# Patient Record
Sex: FEMALE | Race: WHITE | Employment: OTHER | URBAN - NONMETROPOLITAN AREA
[De-identification: names, ages, dates, MRNs, and addresses within clinical notes are randomized per-mention and may not be internally consistent; named-entity substitution may affect disease eponyms.]

---

## 2019-03-26 ENCOUNTER — OFFICE VISIT (OUTPATIENT)
Dept: OBGYN | Age: 69
End: 2019-03-26
Payer: MEDICARE

## 2019-03-26 VITALS
WEIGHT: 174 LBS | HEART RATE: 62 BPM | HEIGHT: 60 IN | DIASTOLIC BLOOD PRESSURE: 74 MMHG | BODY MASS INDEX: 34.16 KG/M2 | SYSTOLIC BLOOD PRESSURE: 135 MMHG

## 2019-03-26 DIAGNOSIS — Z76.89 ENCOUNTER TO ESTABLISH CARE WITH NEW DOCTOR: Primary | ICD-10-CM

## 2019-03-26 DIAGNOSIS — M54.5 ACUTE LOW BACK PAIN, UNSPECIFIED BACK PAIN LATERALITY, WITH SCIATICA PRESENCE UNSPECIFIED: ICD-10-CM

## 2019-03-26 DIAGNOSIS — Z01.419 ENCOUNTER FOR ROUTINE GYNECOLOGIC EXAMINATION IN MEDICARE PATIENT: ICD-10-CM

## 2019-03-26 DIAGNOSIS — N30.00 ACUTE CYSTITIS WITHOUT HEMATURIA: ICD-10-CM

## 2019-03-26 DIAGNOSIS — R10.2 TENDERNESS OF FEMALE PELVIC ORGANS: ICD-10-CM

## 2019-03-26 LAB
BACTERIA URINE, POC: NORMAL
BILIRUBIN URINE: NORMAL MG/DL
BLOOD, URINE: NEGATIVE
CASTS URINE, POC: NORMAL
CLARITY: CLEAR
COLOR: NORMAL
CRYSTALS URINE, POC: NORMAL
EPI CELLS URINE, POC: NORMAL
GLUCOSE URINE: NEGATIVE
KETONES, URINE: NEGATIVE
LEUKOCYTE EST, POC: NEGATIVE
NITRITE, URINE: NEGATIVE
PH UA: 6 (ref 4.5–8)
PROTEIN UA: NEGATIVE
RBC URINE, POC: NORMAL
SPECIFIC GRAVITY UA: 1.01 (ref 1–1.03)
UROBILINOGEN, URINE: NORMAL
WBC URINE, POC: NORMAL
YEAST URINE, POC: NORMAL

## 2019-03-26 PROCEDURE — G8484 FLU IMMUNIZE NO ADMIN: HCPCS | Performed by: OBSTETRICS & GYNECOLOGY

## 2019-03-26 PROCEDURE — 99203 OFFICE O/P NEW LOW 30 MIN: CPT | Performed by: OBSTETRICS & GYNECOLOGY

## 2019-03-26 PROCEDURE — 1101F PT FALLS ASSESS-DOCD LE1/YR: CPT | Performed by: OBSTETRICS & GYNECOLOGY

## 2019-03-26 PROCEDURE — 3017F COLORECTAL CA SCREEN DOC REV: CPT | Performed by: OBSTETRICS & GYNECOLOGY

## 2019-03-26 PROCEDURE — G8427 DOCREV CUR MEDS BY ELIG CLIN: HCPCS | Performed by: OBSTETRICS & GYNECOLOGY

## 2019-03-26 PROCEDURE — 4040F PNEUMOC VAC/ADMIN/RCVD: CPT | Performed by: OBSTETRICS & GYNECOLOGY

## 2019-03-26 PROCEDURE — G8400 PT W/DXA NO RESULTS DOC: HCPCS | Performed by: OBSTETRICS & GYNECOLOGY

## 2019-03-26 PROCEDURE — 81000 URINALYSIS NONAUTO W/SCOPE: CPT | Performed by: OBSTETRICS & GYNECOLOGY

## 2019-03-26 PROCEDURE — 1090F PRES/ABSN URINE INCON ASSESS: CPT | Performed by: OBSTETRICS & GYNECOLOGY

## 2019-03-26 PROCEDURE — G8417 CALC BMI ABV UP PARAM F/U: HCPCS | Performed by: OBSTETRICS & GYNECOLOGY

## 2019-03-26 PROCEDURE — 1036F TOBACCO NON-USER: CPT | Performed by: OBSTETRICS & GYNECOLOGY

## 2019-03-26 PROCEDURE — 1123F ACP DISCUSS/DSCN MKR DOCD: CPT | Performed by: OBSTETRICS & GYNECOLOGY

## 2019-03-26 RX ORDER — ATENOLOL 50 MG/1
TABLET ORAL
COMMUNITY
Start: 2019-03-19

## 2019-03-26 RX ORDER — RAMIPRIL 5 MG/1
CAPSULE ORAL
COMMUNITY
Start: 2019-01-11

## 2019-03-26 RX ORDER — LORATADINE 10 MG/1
10 CAPSULE, LIQUID FILLED ORAL DAILY
COMMUNITY

## 2019-03-26 RX ORDER — HYDROCHLOROTHIAZIDE 25 MG/1
TABLET ORAL
COMMUNITY
Start: 2019-03-19

## 2019-03-26 RX ORDER — CIPROFLOXACIN 500 MG/1
TABLET, FILM COATED ORAL
COMMUNITY

## 2019-03-26 RX ORDER — SIMVASTATIN 10 MG
TABLET ORAL
COMMUNITY
Start: 2019-03-19

## 2019-03-26 RX ORDER — FLUCONAZOLE 100 MG/1
TABLET ORAL
COMMUNITY
Start: 2018-12-19

## 2019-03-26 ASSESSMENT — ENCOUNTER SYMPTOMS
GASTROINTESTINAL NEGATIVE: 1
EYES NEGATIVE: 1
RESPIRATORY NEGATIVE: 1

## 2019-03-29 ENCOUNTER — HOSPITAL ENCOUNTER (OUTPATIENT)
Dept: ULTRASOUND IMAGING | Age: 69
Discharge: HOME | End: 2019-03-29
Payer: MEDICARE

## 2019-03-29 DIAGNOSIS — R10.2 TENDERNESS OF FEMALE PELVIC ORGANS: ICD-10-CM

## 2019-03-29 PROCEDURE — 76830 TRANSVAGINAL US NON-OB: CPT

## 2019-04-08 ENCOUNTER — TELEPHONE (OUTPATIENT)
Dept: OBGYN | Age: 69
End: 2019-04-08

## 2019-04-08 NOTE — TELEPHONE ENCOUNTER
Pt aware that Dr. Fields Needs will be back in office tomorrow and will review results with her then call pt back with her recommendations.  Pt VU

## 2021-01-20 ENCOUNTER — LAB REQUISITION (OUTPATIENT)
Dept: LAB | Facility: HOSPITAL | Age: 71
End: 2021-01-20

## 2021-01-20 DIAGNOSIS — Z00.00 ENCOUNTER FOR GENERAL ADULT MEDICAL EXAMINATION WITHOUT ABNORMAL FINDINGS: ICD-10-CM

## 2021-01-20 PROCEDURE — 88305 TISSUE EXAM BY PATHOLOGIST: CPT | Performed by: SURGERY

## 2021-01-24 LAB
CYTO UR: NORMAL
LAB AP CASE REPORT: NORMAL
LAB AP CLINICAL INFORMATION: NORMAL
PATH REPORT.FINAL DX SPEC: NORMAL
PATH REPORT.GROSS SPEC: NORMAL

## 2021-07-26 ENCOUNTER — OFFICE VISIT (OUTPATIENT)
Dept: CARDIOLOGY | Facility: CLINIC | Age: 71
End: 2021-07-26

## 2021-07-26 VITALS
WEIGHT: 176 LBS | BODY MASS INDEX: 34.55 KG/M2 | HEART RATE: 64 BPM | HEIGHT: 60 IN | DIASTOLIC BLOOD PRESSURE: 88 MMHG | SYSTOLIC BLOOD PRESSURE: 158 MMHG

## 2021-07-26 DIAGNOSIS — I10 ESSENTIAL HYPERTENSION: Primary | ICD-10-CM

## 2021-07-26 DIAGNOSIS — R06.09 DOE (DYSPNEA ON EXERTION): ICD-10-CM

## 2021-07-26 DIAGNOSIS — Z92.29 COVID-19 VACCINE SERIES COMPLETED: ICD-10-CM

## 2021-07-26 DIAGNOSIS — R60.0 PEDAL EDEMA: ICD-10-CM

## 2021-07-26 DIAGNOSIS — E78.2 MIXED HYPERLIPIDEMIA: ICD-10-CM

## 2021-07-26 PROCEDURE — 93000 ELECTROCARDIOGRAM COMPLETE: CPT | Performed by: INTERNAL MEDICINE

## 2021-07-26 PROCEDURE — 99204 OFFICE O/P NEW MOD 45 MIN: CPT | Performed by: INTERNAL MEDICINE

## 2021-07-26 RX ORDER — METOPROLOL SUCCINATE 100 MG/1
100 TABLET, EXTENDED RELEASE ORAL DAILY
COMMUNITY
Start: 2021-07-06

## 2021-07-26 RX ORDER — CLONIDINE HYDROCHLORIDE 0.1 MG/1
0.1 TABLET ORAL 2 TIMES DAILY PRN
Qty: 60 TABLET | Refills: 11 | Status: SHIPPED | OUTPATIENT
Start: 2021-07-26 | End: 2022-05-26 | Stop reason: SDUPTHER

## 2021-07-26 RX ORDER — DILTIAZEM HYDROCHLORIDE 120 MG/1
120 CAPSULE, COATED, EXTENDED RELEASE ORAL DAILY
Qty: 30 CAPSULE | Refills: 1 | Status: SHIPPED | OUTPATIENT
Start: 2021-07-26 | End: 2022-07-11

## 2021-07-26 RX ORDER — LISINOPRIL 20 MG/1
TABLET ORAL
COMMUNITY
Start: 2021-07-06 | End: 2021-07-26 | Stop reason: SDUPTHER

## 2021-07-26 RX ORDER — DILTIAZEM HYDROCHLORIDE 240 MG/1
360 CAPSULE, COATED, EXTENDED RELEASE ORAL
COMMUNITY
Start: 2021-07-06 | End: 2022-07-11

## 2021-07-26 RX ORDER — OMEPRAZOLE 20 MG/1
20 CAPSULE, DELAYED RELEASE ORAL DAILY
COMMUNITY
Start: 2021-07-06

## 2021-07-26 RX ORDER — HYDROCHLOROTHIAZIDE 25 MG/1
25 TABLET ORAL DAILY
COMMUNITY
End: 2022-07-11 | Stop reason: SDUPTHER

## 2021-07-26 RX ORDER — SIMVASTATIN 20 MG
20 TABLET ORAL NIGHTLY
COMMUNITY
Start: 2021-07-06

## 2021-07-26 RX ORDER — LISINOPRIL 20 MG/1
20 TABLET ORAL 2 TIMES DAILY
Qty: 180 TABLET | Refills: 3 | Status: SHIPPED | OUTPATIENT
Start: 2021-07-26 | End: 2022-09-19

## 2021-07-26 NOTE — PROGRESS NOTES
Esha Boogie  [id number]  1950  71 y.o.  female    Referring Provider: Suman Jeffrey PA    Reason for  Visit:  Initial visit for  essential hypertension   and shortness of breath     Subjective      Mild chronic exertional shortness of breath on exertion relieved with rest  No significant cough or wheezing    No palpitations  No associated chest pain  Moderate  pedal edema    No fever or chills  No significant expectoration    No hemoptysis  No presyncope or syncope    Tolerating current medications well with no untoward side effects   Compliant with prescribed medication regimen. Tries to adhere to cardiac diet.     BP elevated at home       History of present illness:  Esha Boogie is a 71 y.o. yo female with history of essential hypertension    who presents today for   Chief Complaint   Patient presents with   • Hypertension     NEW- some dizziness headaches and fatigue. edema in both ankles. not hungry.     .    History  Past Medical History:   Diagnosis Date   • Hypertension    ,   History reviewed. No pertinent surgical history.,   Family History   Problem Relation Age of Onset   • Hypertension Mother    • Hypertension Father    ,   Social History     Tobacco Use   • Smoking status: Never Smoker   • Smokeless tobacco: Never Used   Vaping Use   • Vaping Use: Never used   Substance Use Topics   • Alcohol use: Never   • Drug use: Never   ,     Medications  Current Outpatient Medications   Medication Sig Dispense Refill   • dilTIAZem CD (CARDIZEM CD) 240 MG 24 hr capsule 360 mg.     • hydroCHLOROthiazide (HYDRODIURIL) 25 MG tablet Take 25 mg by mouth Daily.     • lisinopril (PRINIVIL,ZESTRIL) 20 MG tablet Take 1 tablet by mouth 2 (two) times a day. 180 tablet 3   • metoprolol succinate XL (TOPROL-XL) 100 MG 24 hr tablet      • omeprazole (priLOSEC) 20 MG capsule      • simvastatin (ZOCOR) 20 MG tablet      • cloNIDine (Catapres) 0.1 MG tablet Take 1 tablet by mouth 2 (Two) Times a Day As Needed (>  "150/90). 60 tablet 11   • dilTIAZem CD (CARDIZEM CD) 120 MG 24 hr capsule Take 1 capsule by mouth Daily. 30 capsule 1     No current facility-administered medications for this visit.       Allergies:  Patient has no known allergies.    Review of Systems  Review of Systems   Constitutional: Negative.   HENT: Negative.    Eyes: Negative.    Cardiovascular: Positive for dyspnea on exertion. Negative for chest pain, claudication, cyanosis, irregular heartbeat, leg swelling, near-syncope, orthopnea, palpitations, paroxysmal nocturnal dyspnea and syncope.   Respiratory: Negative.    Endocrine: Negative.    Hematologic/Lymphatic: Negative.    Skin: Negative.    Gastrointestinal: Negative for anorexia.   Genitourinary: Negative.    Neurological: Negative.    Psychiatric/Behavioral: Negative.        Objective     Physical Exam:  /88   Pulse 64   Ht 152.4 cm (60\")   Wt 79.8 kg (176 lb)   BMI 34.37 kg/m²     Physical Exam  Constitutional:       Appearance: Normal appearance.   HENT:      Head: Normocephalic.   Eyes:      General: Lids are normal.   Neck:      Vascular: No carotid bruit.   Cardiovascular:      Rate and Rhythm: Regular rhythm.      Heart sounds: S1 normal and S2 normal. Murmur heard.   Systolic murmur is present with a grade of 2/6.     Pulmonary:      Effort: Pulmonary effort is normal.   Abdominal:      Palpations: Abdomen is soft.   Musculoskeletal:      Right lower le+ Pitting Edema present.      Left lower le+ Pitting Edema present.   Neurological:      Mental Status: She is alert.   Psychiatric:         Speech: Speech normal.         Behavior: Behavior normal.         Thought Content: Thought content normal.         Results Review:     ____________________________________________________________________________________________________________________________________________  Health maintenance and recommendations    Low salt/ HTN/ Heart healthy carbohydrate restricted cardiac diet   The " patient is advised to reduce or avoid caffeine or other cardiac stimulants.   Minimize or avoid  NSAID-type medications      Monitor for any signs of bleeding including red or dark stools. Fall precautions.   Advised staying uptodate with immunizations per established standard guidelines.    Offered to give patient  a copy of my notes     Questions were encouraged, asked and answered to the patient's  understanding and satisfaction. Questions if any regarding current medications and side effects, need for refills and importance of compliance to medications stressed.    Reviewed available prior notes, consults, prior visits, laboratory findings, radiology and cardiology relevant reports. Updated chart as applicable. I have reviewed the patient's medical history in detail and updated the computerized patient record as relevant.      Updated patient regarding any new or relevant abnormalities on review of records or any new findings on physical exam. Mentioned to patient about purpose of visit and desirable health short and long term goals and objectives.    Primary to monitor CBC CMP Lipid panel and TSH as applicable    ___________________________________________________________________________________________________________________________________________     ECG 12 Lead    Date/Time: 7/26/2021 9:36 AM  Performed by: Emmanuel Fu MD  Authorized by: Emmanuel Fu MD   Comparison: not compared with previous ECG   Rhythm: sinus rhythm  Rate: normal  Conduction: conduction normal  ST Segments: ST segments normal  T Waves: T waves normal  QRS axis: normal  Other: no other findings    Clinical impression: normal ECG            Assessment/Plan   Diagnoses and all orders for this visit:    1. Essential hypertension (Primary)    2. WILLSON (dyspnea on exertion)  -     Adult Transthoracic Echo Complete w/ Color, Spectral and Contrast if necessary per protocol; Future    3. Mixed hyperlipidemia    4. Pedal edema    5. COVID-19  vaccine series completed    Other orders  -     ECG 12 Lead  -     dilTIAZem CD (CARDIZEM CD) 120 MG 24 hr capsule; Take 1 capsule by mouth Daily.  Dispense: 30 capsule; Refill: 1  -     lisinopril (PRINIVIL,ZESTRIL) 20 MG tablet; Take 1 tablet by mouth 2 (two) times a day.  Dispense: 180 tablet; Refill: 3  -     cloNIDine (Catapres) 0.1 MG tablet; Take 1 tablet by mouth 2 (Two) Times a Day As Needed (> 150/90).  Dispense: 60 tablet; Refill: 11          Plan      Orders Placed This Encounter   Procedures   • ECG 12 Lead     This order was created via procedure documentation     Order Specific Question:   Release to patient     Answer:   Immediate   • Adult Transthoracic Echo Complete w/ Color, Spectral and Contrast if necessary per protocol     Standing Status:   Future     Standing Expiration Date:   7/26/2022     Scheduling Instructions:      Myocardial strain to be performed during echocardiogram as long as technically feasible     Order Specific Question:   Reason for exam?     Answer:   Dyspnea     Order Specific Question:   Release to patient     Answer:   Immediate      Taper and stop Diltiazem CD   Decrease from 360 to 240 mg daily x 21 week and then 120 mg x 1 weeks then stop   Requested Prescriptions     Signed Prescriptions Disp Refills   • dilTIAZem CD (CARDIZEM CD) 120 MG 24 hr capsule 30 capsule 1     Sig: Take 1 capsule by mouth Daily.   • lisinopril (PRINIVIL,ZESTRIL) 20 MG tablet 180 tablet 3     Sig: Take 1 tablet by mouth 2 (two) times a day.   • cloNIDine (Catapres) 0.1 MG tablet 60 tablet 11     Sig: Take 1 tablet by mouth 2 (Two) Times a Day As Needed (> 150/90).      Increase Lisinopril 20 mg BID    Can take PO clonidine 0.1 mg BID prn for BP > 140/90 mm Hg     Recommend evaluation for obstructive sleep apnea given snoring and daytime somnolence and fatigue by primary provider  In addition has body habitus increasing the likelihood of obstructive sleep apnea      I support the patient's  decision to take the Covid -19 vaccine   Had both dose   No major issues   Now fully immunized      Follow up with any mid level provider working with me to address interim issues             Return in about 6 weeks (around 9/6/2021).

## 2021-08-26 ENCOUNTER — TELEPHONE (OUTPATIENT)
Dept: CARDIOLOGY | Facility: CLINIC | Age: 71
End: 2021-08-26

## 2021-08-26 NOTE — TELEPHONE ENCOUNTER
Meenakshi with [facility] is calling to get the patient's testing rescheduled.  She can be reached at [phone].  Thank you!

## 2021-09-02 ENCOUNTER — APPOINTMENT (OUTPATIENT)
Dept: CARDIOLOGY | Facility: HOSPITAL | Age: 71
End: 2021-09-02

## 2021-09-30 ENCOUNTER — APPOINTMENT (OUTPATIENT)
Dept: CARDIOLOGY | Facility: HOSPITAL | Age: 71
End: 2021-09-30

## 2021-10-22 ENCOUNTER — NURSE TRIAGE (OUTPATIENT)
Dept: CALL CENTER | Facility: HOSPITAL | Age: 71
End: 2021-10-22

## 2021-10-23 NOTE — TELEPHONE ENCOUNTER
"Caller states moderate swelling lower ext's. Caller states lower ext's red and some warm to touch. Caller denies dyspnea. Caller advised per guideline.             Reason for Disposition  • Patient sounds very sick or weak to the triager    Additional Information  • Negative: SEVERE difficulty breathing (e.g., struggling for each breath, speaks in single words)  • Negative: Looks like a broken bone or dislocated joint (e.g., crooked or deformed)  • Negative: Sounds like a life-threatening emergency to the triager  • Negative: Chest pain  • Negative: Followed a leg injury  • Negative: [1] Small area of swelling AND [2] followed an insect bite to the area  • Negative: Swelling of one ankle joint  • Negative: Swelling of knee is main symptom  • Negative: Pregnant  • Negative: Postpartum (from 0 to 6 weeks after delivery)  • Negative: Difficulty breathing at rest  • Negative: Entire foot is cool or blue in comparison to other side  • Negative: [1] Can't walk or can barely walk AND [2] new-onset  • Negative: [1] Difficulty breathing with exertion (e.g., walking) AND [2] new-onset or worsening  • Negative: [1] Red area or streak AND [2] fever  • Negative: [1] Swelling is painful to touch AND [2] fever  • Negative: [1] Cast on leg or ankle AND [2] now increased pain    Answer Assessment - Initial Assessment Questions  1. ONSET: \"When did the swelling start?\" (e.g., minutes, hours, days)      I think I've had it and they are red   2. LOCATION: \"What part of the leg is swollen?\"  \"Are both legs swollen or just one leg?\"      Swelling knee down   3. SEVERITY: \"How bad is the swelling?\" (e.g., localized; mild, moderate, severe)   - Localized - small area of swelling localized to one leg   - MILD pedal edema - swelling limited to foot and ankle, pitting edema < 1/4 inch (6 mm) deep, rest and elevation eliminate most or all swelling   - MODERATE edema - swelling of lower leg to knee, pitting edema > 1/4 inch (6 mm) deep, rest " "and elevation only partially reduce swelling   - SEVERE edema - swelling extends above knee, facial or hand swelling present       Moderate   4. REDNESS: \"Does the swelling look red or infected?\"      red  5. PAIN: \"Is the swelling painful to touch?\" If Yes, ask: \"How painful is it?\"   (Scale 1-10; mild, moderate or severe)      Not comfortable but not terrible   6. FEVER: \"Do you have a fever?\" If Yes, ask: \"What is it, how was it measured, and when did it start?\"         Denies   7. CAUSE: \"What do you think is causing the leg swelling?\"      Medication  8. MEDICAL HISTORY: \"Do you have a history of heart failure, kidney disease, liver failure, or cancer?\"      Denies all   9. RECURRENT SYMPTOM: \"Have you had leg swelling before?\" If Yes, ask: \"When was the last time?\" \"What happened that time?\"      I've had ankles before with medication   10. OTHER SYMPTOMS: \"Do you have any other symptoms?\" (e.g., chest pain, difficulty breathing)        Right chest tingle and states told her in the office the other day   11. PREGNANCY: \"Is there any chance you are pregnant?\" \"When was your last menstrual period?\"    Protocols used: LEG SWELLING AND EDEMA-ADULT-AH      "

## 2021-11-24 ENCOUNTER — TELEPHONE (OUTPATIENT)
Dept: CARDIOLOGY | Facility: CLINIC | Age: 71
End: 2021-11-24

## 2021-11-24 NOTE — TELEPHONE ENCOUNTER
Patient stated that she was told to take Losartan 50 mg She is doubling it and takes 100 mg all together. She took 1 this morning and I told her to take 1 at noon because she stated that taking them both together at the same times doesn't make her feel good.

## 2021-11-30 ENCOUNTER — OFFICE VISIT (OUTPATIENT)
Dept: CARDIOLOGY | Facility: CLINIC | Age: 71
End: 2021-11-30

## 2021-11-30 VITALS
BODY MASS INDEX: 33.38 KG/M2 | WEIGHT: 170 LBS | SYSTOLIC BLOOD PRESSURE: 160 MMHG | OXYGEN SATURATION: 95 % | HEIGHT: 60 IN | HEART RATE: 64 BPM | DIASTOLIC BLOOD PRESSURE: 90 MMHG

## 2021-11-30 DIAGNOSIS — E78.2 MIXED HYPERLIPIDEMIA: ICD-10-CM

## 2021-11-30 DIAGNOSIS — R06.09 DOE (DYSPNEA ON EXERTION): ICD-10-CM

## 2021-11-30 DIAGNOSIS — R60.0 PEDAL EDEMA: ICD-10-CM

## 2021-11-30 DIAGNOSIS — I10 ESSENTIAL HYPERTENSION: Primary | ICD-10-CM

## 2021-11-30 PROCEDURE — 99214 OFFICE O/P EST MOD 30 MIN: CPT | Performed by: INTERNAL MEDICINE

## 2021-11-30 RX ORDER — CLONIDINE 0.1 MG/24H
1 PATCH, EXTENDED RELEASE TRANSDERMAL WEEKLY
Qty: 4 PATCH | Refills: 11 | Status: SHIPPED | OUTPATIENT
Start: 2021-11-30 | End: 2021-12-16 | Stop reason: SDUPTHER

## 2021-11-30 NOTE — PROGRESS NOTES
Esha Boogie  [id number]  1950  71 y.o.  female    Referring Provider: Suman Jeffrey PA    Reason for  Visit:      Patient called to be seen due to uncontrolled HTN   Initial visit for  essential hypertension and shortness of breath     Subjective    BP elevated at home   Reviewed home BP recordings     Mild chronic exertional shortness of breath on exertion relieved with rest  No significant cough or wheezing    No palpitations  No associated chest pain  Moderate  pedal edema    No fever or chills  No significant expectoration    No hemoptysis  No presyncope or syncope    Tolerating current medications well with no untoward side effects   Compliant with prescribed medication regimen. Tries to adhere to cardiac diet.     No bleeding, excessive bruising, gait instability or fall risks      History of present illness:  Esha Boogie is a 71 y.o. yo female with  essential hypertension    who presents today for   Chief Complaint   Patient presents with   • Hypertension   .    History  Past Medical History:   Diagnosis Date   • Hypertension    ,   History reviewed. No pertinent surgical history.,   Family History   Problem Relation Age of Onset   • Hypertension Mother    • Hypertension Father    ,   Social History     Tobacco Use   • Smoking status: Never Smoker   • Smokeless tobacco: Never Used   Vaping Use   • Vaping Use: Never used   Substance Use Topics   • Alcohol use: Never   • Drug use: Never   ,     Medications  Current Outpatient Medications   Medication Sig Dispense Refill   • cloNIDine (Catapres) 0.1 MG tablet Take 1 tablet by mouth 2 (Two) Times a Day As Needed (> 150/90). 60 tablet 11   • hydroCHLOROthiazide (HYDRODIURIL) 25 MG tablet Take 25 mg by mouth Daily.     • lisinopril (PRINIVIL,ZESTRIL) 20 MG tablet Take 1 tablet by mouth 2 (two) times a day. 180 tablet 3   • metoprolol succinate XL (TOPROL-XL) 100 MG 24 hr tablet Take 100 mg by mouth Daily.     • omeprazole (priLOSEC) 20 MG capsule  "Take 20 mg by mouth Daily.     • simvastatin (ZOCOR) 20 MG tablet      • cloNIDine (CATAPRES-TTS) 0.1 MG/24HR patch Place 1 patch on the skin as directed by provider 1 (One) Time Per Week. 4 patch 11   • dilTIAZem CD (CARDIZEM CD) 120 MG 24 hr capsule Take 1 capsule by mouth Daily. 30 capsule 1   • dilTIAZem CD (CARDIZEM CD) 240 MG 24 hr capsule 360 mg.       No current facility-administered medications for this visit.       Allergies:  Patient has no known allergies.    Review of Systems  Review of Systems   Constitutional: Negative.   HENT: Negative.    Eyes: Negative.    Cardiovascular: Positive for dyspnea on exertion. Negative for chest pain, claudication, cyanosis, irregular heartbeat, leg swelling, near-syncope, orthopnea, palpitations, paroxysmal nocturnal dyspnea and syncope.   Respiratory: Negative.    Endocrine: Negative.    Hematologic/Lymphatic: Negative.    Skin: Negative.    Gastrointestinal: Negative for anorexia.   Genitourinary: Negative.    Neurological: Negative.    Psychiatric/Behavioral: Negative.        Objective     Physical Exam:  /90   Pulse 64   Ht 152.4 cm (60\")   Wt 77.1 kg (170 lb)   SpO2 95%   BMI 33.20 kg/m²     Physical Exam  Constitutional:       Appearance: Normal appearance.   HENT:      Head: Normocephalic.   Eyes:      General: Lids are normal.   Neck:      Vascular: No carotid bruit.   Cardiovascular:      Rate and Rhythm: Regular rhythm.      Heart sounds: S1 normal and S2 normal. Murmur heard.    Systolic murmur is present with a grade of 2/6.      Pulmonary:      Effort: Pulmonary effort is normal.   Abdominal:      Palpations: Abdomen is soft.   Musculoskeletal:      Right lower le+ Pitting Edema present.      Left lower le+ Pitting Edema present.   Neurological:      Mental Status: She is alert.   Psychiatric:         Speech: Speech normal.         Behavior: Behavior normal.         Thought Content: Thought content normal.         Results Review:        "   ____________________________________________________________________________________________________________________________________________  Health maintenance and recommendations    Low salt/ HTN/ Heart healthy carbohydrate restricted cardiac diet   The patient is advised to reduce or avoid caffeine or other cardiac stimulants.   Minimize or avoid  NSAID-type medications      Monitor for any signs of bleeding including red or dark stools. Fall precautions.   Advised staying uptodate with immunizations per established standard guidelines.    Offered to give patient  a copy of my notes     Questions were encouraged, asked and answered to the patient's  understanding and satisfaction. Questions if any regarding current medications and side effects, need for refills and importance of compliance to medications stressed.    Reviewed available prior notes, consults, prior visits, laboratory findings, radiology and cardiology relevant reports. Updated chart as applicable. I have reviewed the patient's medical history in detail and updated the computerized patient record as relevant.      Updated patient regarding any new or relevant abnormalities on review of records or any new findings on physical exam. Mentioned to patient about purpose of visit and desirable health short and long term goals and objectives.    Primary to monitor CBC CMP Lipid panel and TSH as applicable    ___________________________________________________________________________________________________________________________________________   Procedures    Assessment/Plan   Diagnoses and all orders for this visit:    1. Essential hypertension (Primary)    2. Mixed hyperlipidemia    3. Pedal edema    4. WILLSON (dyspnea on exertion)    Other orders  -     cloNIDine (CATAPRES-TTS) 0.1 MG/24HR patch; Place 1 patch on the skin as directed by provider 1 (One) Time Per Week.  Dispense: 4 patch; Refill: 11          Plan    Needs better BP control    Requested  Prescriptions     Signed Prescriptions Disp Refills   • cloNIDine (CATAPRES-TTS) 0.1 MG/24HR patch 4 patch 11     Sig: Place 1 patch on the skin as directed by provider 1 (One) Time Per Week.      Check BP and heart rates twice daily at home and bring a recording for me to review next visit  If BP >130/85 or < 100/60 persistently over 3 reading 30 mins apart call sooner      Continue Lisinopril 20 mg BID  Can take PO clonidine 0.1 mg BID prn for BP > 140/90 mm Hg     Has been diagnosed with obstructive sleep apnea and awaiting CPAP machine   Use daily when available     I support the patient's decision to take the Covid -19 vaccine   Had required complete course   No major issues   Now fully immunized    Recommend booster             Return in about 6 weeks (around 1/11/2022).

## 2021-12-16 RX ORDER — CLONIDINE 0.1 MG/24H
1 PATCH, EXTENDED RELEASE TRANSDERMAL WEEKLY
Qty: 12 PATCH | Refills: 4 | Status: SHIPPED | OUTPATIENT
Start: 2021-12-16 | End: 2022-05-26 | Stop reason: SINTOL

## 2022-01-11 ENCOUNTER — OFFICE VISIT (OUTPATIENT)
Dept: CARDIOLOGY | Facility: CLINIC | Age: 72
End: 2022-01-11

## 2022-01-11 VITALS
HEIGHT: 60 IN | HEART RATE: 59 BPM | DIASTOLIC BLOOD PRESSURE: 90 MMHG | BODY MASS INDEX: 33.96 KG/M2 | WEIGHT: 173 LBS | OXYGEN SATURATION: 99 % | SYSTOLIC BLOOD PRESSURE: 158 MMHG

## 2022-01-11 DIAGNOSIS — R06.09 DOE (DYSPNEA ON EXERTION): ICD-10-CM

## 2022-01-11 DIAGNOSIS — G47.33 OSA TREATED WITH BIPAP: ICD-10-CM

## 2022-01-11 DIAGNOSIS — R60.0 PEDAL EDEMA: ICD-10-CM

## 2022-01-11 DIAGNOSIS — E78.2 MIXED HYPERLIPIDEMIA: Primary | ICD-10-CM

## 2022-01-11 DIAGNOSIS — I10 ESSENTIAL HYPERTENSION: ICD-10-CM

## 2022-01-11 PROCEDURE — 99214 OFFICE O/P EST MOD 30 MIN: CPT | Performed by: INTERNAL MEDICINE

## 2022-01-11 NOTE — PROGRESS NOTES
Esha Boogie  [id number]  1950  71 y.o.  female    Referring Provider: Suman Jeffrey PA    Reason for  Visit:      Here for routine follow up   Patient called to be seen due to uncontrolled HTN prior visit     Initial visit for  essential hypertension and shortness of breath   Cardiac workup test results as below: echo at [facility]     Subjective      Intolerant to BiPap  No new events or complaints since last visit     BP well controlled at home.     Reviewed home BP recordings     Mild chronic exertional shortness of breath on exertion relieved with rest  No significant cough or wheezing    No palpitations  No associated chest pain  Much less pedal edema    No fever or chills  No significant expectoration    No hemoptysis  No presyncope or syncope    Tolerating current medications well with no untoward side effects   Compliant with prescribed medication regimen. Tries to adhere to cardiac diet.     No bleeding, excessive bruising, gait instability or fall risks      History of present illness:  Esha Boogie is a 71 y.o. yo female with  essential hypertension    who presents today for   Chief Complaint   Patient presents with   • Hypertension     6wk-    .    History  Past Medical History:   Diagnosis Date   • Hypertension    ,   History reviewed. No pertinent surgical history.,   Family History   Problem Relation Age of Onset   • Hypertension Mother    • Hypertension Father    ,   Social History     Tobacco Use   • Smoking status: Never Smoker   • Smokeless tobacco: Never Used   Vaping Use   • Vaping Use: Never used   Substance Use Topics   • Alcohol use: Never   • Drug use: Never   ,     Medications  Current Outpatient Medications   Medication Sig Dispense Refill   • cloNIDine (Catapres) 0.1 MG tablet Take 1 tablet by mouth 2 (Two) Times a Day As Needed (> 150/90). 60 tablet 11   • cloNIDine (CATAPRES-TTS) 0.1 MG/24HR patch Place 1 patch on the skin as directed by provider 1 (One) Time Per Week.  "Patient requested 90 day RX 12 patch 4   • hydroCHLOROthiazide (HYDRODIURIL) 25 MG tablet Take 25 mg by mouth Daily.     • lisinopril (PRINIVIL,ZESTRIL) 20 MG tablet Take 1 tablet by mouth 2 (two) times a day. 180 tablet 3   • metoprolol succinate XL (TOPROL-XL) 100 MG 24 hr tablet Take 100 mg by mouth Daily.     • omeprazole (priLOSEC) 20 MG capsule Take 20 mg by mouth Daily.     • simvastatin (ZOCOR) 20 MG tablet      • dilTIAZem CD (CARDIZEM CD) 120 MG 24 hr capsule Take 1 capsule by mouth Daily. 30 capsule 1   • dilTIAZem CD (CARDIZEM CD) 240 MG 24 hr capsule 360 mg.       No current facility-administered medications for this visit.       Allergies:  Patient has no known allergies.    Review of Systems  Review of Systems   Constitutional: Negative.   HENT: Negative.    Eyes: Negative.    Cardiovascular: Positive for dyspnea on exertion and leg swelling. Negative for chest pain, claudication, cyanosis, irregular heartbeat, near-syncope, orthopnea, palpitations, paroxysmal nocturnal dyspnea and syncope.   Respiratory: Negative.    Endocrine: Negative.    Hematologic/Lymphatic: Negative.    Skin: Negative.    Gastrointestinal: Negative for anorexia.   Genitourinary: Negative.    Neurological: Negative.    Psychiatric/Behavioral: Negative.        Objective     Physical Exam:  /90   Pulse 59   Ht 152.4 cm (60\")   Wt 78.5 kg (173 lb)   SpO2 99%   BMI 33.79 kg/m²     Physical Exam  Constitutional:       Appearance: Normal appearance.   HENT:      Head: Normocephalic.   Eyes:      General: Lids are normal.   Neck:      Vascular: No carotid bruit.   Cardiovascular:      Rate and Rhythm: Regular rhythm.      Heart sounds: S1 normal and S2 normal. Murmur heard.    Systolic murmur is present with a grade of 2/6.      Pulmonary:      Effort: Pulmonary effort is normal.   Abdominal:      Palpations: Abdomen is soft.   Musculoskeletal:      Right lower le+ Pitting Edema present.      Left lower le+ Pitting " Edema present.   Neurological:      Mental Status: She is alert.   Psychiatric:         Speech: Speech normal.         Behavior: Behavior normal.         Thought Content: Thought content normal.         Results Review:          ____________________________________________________________________________________________________________________________________________  Health maintenance and recommendations    Low salt/ HTN/ Heart healthy carbohydrate restricted cardiac diet   The patient is advised to reduce or avoid caffeine or other cardiac stimulants.   Minimize or avoid  NSAID-type medications      Monitor for any signs of bleeding including red or dark stools. Fall precautions.   Advised staying uptodate with immunizations per established standard guidelines.    Offered to give patient  a copy of my notes     Questions were encouraged, asked and answered to the patient's  understanding and satisfaction. Questions if any regarding current medications and side effects, need for refills and importance of compliance to medications stressed.    Reviewed available prior notes, consults, prior visits, laboratory findings, radiology and cardiology relevant reports. Updated chart as applicable. I have reviewed the patient's medical history in detail and updated the computerized patient record as relevant.      Updated patient regarding any new or relevant abnormalities on review of records or any new findings on physical exam. Mentioned to patient about purpose of visit and desirable health short and long term goals and objectives.    Primary to monitor CBC CMP Lipid panel and TSH as applicable    ___________________________________________________________________________________________________________________________________________   Procedures    Assessment/Plan   Diagnoses and all orders for this visit:    1. Mixed hyperlipidemia (Primary)    2. WILLSON (dyspnea on exertion)    3. Essential hypertension    4. Pedal  edema    5. KATY treated with BiPAP    6. BMI 33.0-33.9,adult          Plan    Patient expressed understanding  Encouraged and answered all questions   Discussed with the patient and all questioned fully answered. She will call me if any problems arise.   Discussed results of prior testing with patient : echo     Overall doing well no new cardiovascular symptoms and therefore no additional cardiac testing is required   If any interim issues arise will call me for further evaluation.     Check BP and heart rates twice daily initially till blood pressures and heart rates under good control and then at least 3x / week,   If blood pressures continue to be well-controlled then can check week a month  at home and bring a recording for review next visit  If BP >130/85 or < 100/60 persistently over 3 reading 30 mins apart or if heart rates persistently above 100 bpm or less than 55 bpm call sooner for evaluation and advise     Continue Lisinopril 20 mg BID  Can take PO clonidine 0.1 mg BID prn for BP > 140/90 mm Hg     Has been diagnosed with obstructive sleep apnea and awaiting BiPAP machine  Adjustment   Use daily when available     I support the patient's decision to take the Covid -19 vaccine   Had required complete course   No major issues   Now fully immunized    Recommend booster       Follow up with Kandis BENJAMIN , Augustine BENJAMIN  or myself          Return in about 6 months (around 7/11/2022).

## 2022-05-24 ENCOUNTER — HOSPITAL ENCOUNTER (OUTPATIENT)
Dept: MRI IMAGING | Age: 72
Discharge: HOME | End: 2022-05-24
Payer: MEDICARE

## 2022-05-24 DIAGNOSIS — M25.511 RIGHT SHOULDER PAIN, UNSPECIFIED CHRONICITY: ICD-10-CM

## 2022-05-24 PROCEDURE — 73221 MRI JOINT UPR EXTREM W/O DYE: CPT

## 2022-05-26 ENCOUNTER — TELEPHONE (OUTPATIENT)
Dept: CARDIOLOGY | Facility: CLINIC | Age: 72
End: 2022-05-26

## 2022-05-26 RX ORDER — CLONIDINE HYDROCHLORIDE 0.1 MG/1
0.1 TABLET ORAL 3 TIMES DAILY
Qty: 90 TABLET | Refills: 11 | Status: SHIPPED | OUTPATIENT
Start: 2022-05-26 | End: 2022-07-11 | Stop reason: SDUPTHER

## 2022-07-08 PROBLEM — E66.811 CLASS 1 OBESITY DUE TO EXCESS CALORIES WITH SERIOUS COMORBIDITY AND BODY MASS INDEX (BMI) OF 33.0 TO 33.9 IN ADULT: Status: ACTIVE | Noted: 2022-01-11

## 2022-07-08 PROBLEM — E66.09 CLASS 1 OBESITY DUE TO EXCESS CALORIES WITH SERIOUS COMORBIDITY AND BODY MASS INDEX (BMI) OF 33.0 TO 33.9 IN ADULT: Status: ACTIVE | Noted: 2022-01-11

## 2022-07-08 NOTE — PROGRESS NOTES
Chief Complaint  Hypertension (6mo F/U. ) and Dyspnea    Subjective          Eshajon Boogie presents to [facility] for routine follow-up.  She has hypertension, hyperlipidemia, obstructive sleep apnea, GERD and obesity. She complains of chronic bilateral lower extremity edema. She reports occasional palpitations. Patient denies chest pain, shortness of breath, dizziness, syncope, orthopnea, PND or decreased stamina.  Patient denies any signs of bleeding.    Hypertension  This is a chronic problem. The current episode started more than 1 year ago. The problem is uncontrolled. Associated symptoms include palpitations and peripheral edema. Pertinent negatives include no anxiety, blurred vision, chest pain, headaches, malaise/fatigue, neck pain, orthopnea, PND, shortness of breath or sweats. Risk factors for coronary artery disease include obesity, dyslipidemia and post-menopausal state. Current antihypertension treatment includes central alpha agonists, ACE inhibitors, beta blockers and calcium channel blockers. The current treatment provides significant improvement. Identifiable causes of hypertension include sleep apnea.   Hyperlipidemia  This is a chronic problem. The current episode started more than 1 year ago. The problem is controlled. Exacerbating diseases include obesity. Pertinent negatives include no chest pain or shortness of breath. Current antihyperlipidemic treatment includes statins. Risk factors for coronary artery disease include hypertension, obesity, post-menopausal and dyslipidemia.       Objective     Current Outpatient Medications:   •  cholecalciferol (VITAMIN D3) 25 MCG (1000 UT) tablet, Take 1,000 Units by mouth Daily., Disp: , Rfl:   •  cloNIDine (Catapres) 0.1 MG tablet, Take 1 tablet by mouth 3 (Three) Times a Day., Disp: 270 tablet, Rfl: 3  •  hydroCHLOROthiazide (HYDRODIURIL) 50 MG tablet, Take 1 tablet by mouth Daily., Disp: 90 tablet, Rfl: 3  •   "lisinopril (PRINIVIL,ZESTRIL) 20 MG tablet, Take 1 tablet by mouth 2 (two) times a day., Disp: 180 tablet, Rfl: 3  •  metoprolol succinate XL (TOPROL-XL) 100 MG 24 hr tablet, Take 100 mg by mouth Daily., Disp: , Rfl:   •  multivitamin with minerals (MULTIVITAMIN ADULT PO), Take 1 tablet by mouth Daily., Disp: , Rfl:   •  omeprazole (priLOSEC) 20 MG capsule, Take 20 mg by mouth Daily., Disp: , Rfl:   •  simvastatin (ZOCOR) 20 MG tablet, Take 20 mg by mouth Every Night., Disp: , Rfl:   •  vitamin C (ASCORBIC ACID) 250 MG tablet, Take 1,000 mg by mouth Daily., Disp: , Rfl:   •  Zinc 50 MG capsule, Take  by mouth., Disp: , Rfl:   •  furosemide (LASIX) 40 MG tablet, Take 40 mg by mouth As Needed., Disp: , Rfl:   Vital Signs:   /98   Pulse 63   Ht 152.4 cm (60\")   Wt 80.7 kg (178 lb)   SpO2 98%   BMI 34.76 kg/m²     Vitals and nursing note reviewed.   Constitutional:       General: Not in acute distress.     Appearance: Normal and healthy appearance. Well-developed and not in distress. Obese. Not diaphoretic.   Eyes:      General: Lids are normal.         Right eye: No discharge.         Left eye: No discharge.      Conjunctiva/sclera: Conjunctivae normal.      Pupils: Pupils are equal, round, and reactive to light.   HENT:      Head: Normocephalic and atraumatic.      Jaw: There is normal jaw occlusion.      Right Ear: External ear normal.      Left Ear: External ear normal.      Nose: Nose normal.   Neck:      Thyroid: No thyromegaly.      Vascular: No carotid bruit, JVD or JVR. JVD normal.      Trachea: Trachea normal. No tracheal deviation.   Pulmonary:      Effort: Pulmonary effort is normal. No respiratory distress.      Breath sounds: Normal breath sounds. No decreased breath sounds. No wheezing. No rhonchi. No rales.   Chest:      Chest wall: Not tender to palpatation.   Cardiovascular:      PMI at left midclavicular line. Normal rate. Regular rhythm. Normal S1. Normal S2.      Murmurs: There is no " murmur.      No gallop. No click. No rub.   Pulses:     Intact distal pulses. No decreased pulses.   Edema:     Peripheral edema absent.   Abdominal:      General: Bowel sounds are normal. There is no distension.      Palpations: Abdomen is soft.      Tenderness: There is no abdominal tenderness.   Musculoskeletal: Normal range of motion.         General: No tenderness or deformity.      Cervical back: Normal range of motion and neck supple. Skin:     General: Skin is warm and dry.      Coloration: Skin is not pale.      Findings: No erythema or rash.   Neurological:      General: No focal deficit present.      Mental Status: Alert, oriented to person, place, and time and oriented to person, place and time.   Psychiatric:         Attention and Perception: Attention and perception normal.         Mood and Affect: Mood and affect normal.         Speech: Speech normal.         Behavior: Behavior normal.         Thought Content: Thought content normal.         Cognition and Memory: Cognition and memory normal.         Judgment: Judgment normal.        Result Review :   The following data was reviewed by: CASSIDY Rodriguez on 07/11/2022:        ECG 12 Lead    Date/Time: 7/11/2022 11:10 AM  Performed by: Kandis Snider APRN  Authorized by: Kandis Snider APRN   Comparison: compared with previous ECG from 7/23/2021  Rhythm: sinus rhythm  Rate: normal  BPM: 63    Clinical impression: abnormal EKG              Assessment and Plan    Diagnoses and all orders for this visit:    1. Essential hypertension (Primary)-blood pressure is markedly elevated in office today. Pt reports consistently higher than 130/90 at home. Increase HCTZ to 50 mg daily. Continue clonidine, metoprolol succinate, diltiazem, and lisinopril.  Monitor and record daily blood pressure. Report readings consistently higher than 130/90 or consistently lower than 100/60.     2. Mixed hyperlipidemia-management per PCP.  Continue simvastatin.    3.  KATY treated with BiPAP-patient reports compliance with BiPAP.  Stable.    4. Class 1 obesity due to excess calories with serious comorbidity and body mass index (BMI) of 34.0 to 34.9 in adult- BMI is >= 30 and <35. (Class 1 Obesity). The following options were offered after discussion;: weight loss educational material (shared in after visit summary).     5. Pedal edema- check 2d echo and bilateral lower extremity venous ultrasound.     Follow Up   Return in about 4 weeks (around 8/8/2022) for Next scheduled follow up.  Patient was given instructions and counseling regarding her condition or for health maintenance advice. Please see specific information pulled into the AVS if appropriate.

## 2022-07-11 ENCOUNTER — OFFICE VISIT (OUTPATIENT)
Dept: CARDIOLOGY | Facility: CLINIC | Age: 72
End: 2022-07-11

## 2022-07-11 ENCOUNTER — HOSPITAL ENCOUNTER (OUTPATIENT)
Dept: ULTRASOUND IMAGING | Facility: HOSPITAL | Age: 72
Discharge: HOME | End: 2022-07-11
Admitting: NURSE PRACTITIONER

## 2022-07-11 VITALS
DIASTOLIC BLOOD PRESSURE: 98 MMHG | HEIGHT: 60 IN | HEART RATE: 63 BPM | SYSTOLIC BLOOD PRESSURE: 152 MMHG | OXYGEN SATURATION: 98 % | WEIGHT: 178 LBS | BODY MASS INDEX: 34.95 KG/M2

## 2022-07-11 DIAGNOSIS — R60.0 PEDAL EDEMA: ICD-10-CM

## 2022-07-11 DIAGNOSIS — G47.33 OSA TREATED WITH BIPAP: ICD-10-CM

## 2022-07-11 DIAGNOSIS — E66.09 CLASS 1 OBESITY DUE TO EXCESS CALORIES WITH SERIOUS COMORBIDITY AND BODY MASS INDEX (BMI) OF 34.0 TO 34.9 IN ADULT: ICD-10-CM

## 2022-07-11 DIAGNOSIS — E78.2 MIXED HYPERLIPIDEMIA: ICD-10-CM

## 2022-07-11 DIAGNOSIS — I10 ESSENTIAL HYPERTENSION: Primary | ICD-10-CM

## 2022-07-11 PROCEDURE — 93000 ELECTROCARDIOGRAM COMPLETE: CPT | Performed by: NURSE PRACTITIONER

## 2022-07-11 PROCEDURE — 99214 OFFICE O/P EST MOD 30 MIN: CPT | Performed by: NURSE PRACTITIONER

## 2022-07-11 PROCEDURE — 93970 EXTREMITY STUDY: CPT

## 2022-07-11 RX ORDER — CLONIDINE HYDROCHLORIDE 0.1 MG/1
0.1 TABLET ORAL 3 TIMES DAILY
Qty: 270 TABLET | Refills: 3 | Status: SHIPPED | OUTPATIENT
Start: 2022-07-11

## 2022-07-11 RX ORDER — MELATONIN
1000 DAILY
COMMUNITY
End: 2022-09-19

## 2022-07-11 RX ORDER — HYDROCHLOROTHIAZIDE 50 MG/1
50 TABLET ORAL DAILY
Qty: 90 TABLET | Refills: 3 | Status: SHIPPED | OUTPATIENT
Start: 2022-07-11 | End: 2022-09-19

## 2022-07-11 RX ORDER — FUROSEMIDE 40 MG/1
40 TABLET ORAL AS NEEDED
COMMUNITY
End: 2022-09-19

## 2022-07-11 RX ORDER — MULTIPLE VITAMINS W/ MINERALS TAB 9MG-400MCG
1 TAB ORAL DAILY
COMMUNITY

## 2022-07-11 RX ORDER — MULTIVIT WITH MINERALS/LUTEIN
1000 TABLET ORAL DAILY
COMMUNITY
End: 2022-12-06

## 2022-07-12 DIAGNOSIS — I87.2 VENOUS INSUFFICIENCY: Primary | ICD-10-CM

## 2022-07-14 ENCOUNTER — TELEPHONE (OUTPATIENT)
Dept: VASCULAR SURGERY | Facility: CLINIC | Age: 72
End: 2022-07-14

## 2022-07-15 ENCOUNTER — OFFICE VISIT (OUTPATIENT)
Dept: VASCULAR SURGERY | Facility: CLINIC | Age: 72
End: 2022-07-15

## 2022-07-15 VITALS
BODY MASS INDEX: 34.36 KG/M2 | HEART RATE: 57 BPM | OXYGEN SATURATION: 96 % | DIASTOLIC BLOOD PRESSURE: 78 MMHG | WEIGHT: 175 LBS | SYSTOLIC BLOOD PRESSURE: 134 MMHG | HEIGHT: 60 IN

## 2022-07-15 DIAGNOSIS — I10 ESSENTIAL HYPERTENSION: ICD-10-CM

## 2022-07-15 DIAGNOSIS — I87.323 CHRONIC VENOUS HYPERTENSION WITH INFLAMMATION INVOLVING BOTH SIDES: Primary | ICD-10-CM

## 2022-07-15 DIAGNOSIS — E78.2 MIXED HYPERLIPIDEMIA: ICD-10-CM

## 2022-07-15 PROCEDURE — 99214 OFFICE O/P EST MOD 30 MIN: CPT | Performed by: NURSE PRACTITIONER

## 2022-07-15 NOTE — PROGRESS NOTES
07/15/2022      Kandis Snider APRN  [address]  [facility] [address]  [place] [address]    Esha Boogie  1950    Chief Complaint   Patient presents with   • Chronic Venous Insufficiency     Pt is here for venous insufficiency.  Pt had a venous duplex and was found to have reflux.  Pt denies wearing compression.  She states that she has tried before, but is unable to wear them.  Pt referred by CASSIDY Raymundo.         Dear CASSIDY Rodriguez:      HPI  I had the pleasure of seeing your patient Esha Boogie in the office today.  Thank you kindly for this consultation.  As you recall, Esha Boogie is a 72 y.o.  female who you are currently following for hypertension and dyspnea.  She has been having lower extremity swelling.  She has tried compression stockings but did not fit her well.  She reports these were not measured.  She is maintained on Zocor. She did have noninvasive testing performed, which I did review in office.     Past Medical History:   Diagnosis Date   • GERD (gastroesophageal reflux disease)    • Hyperlipidemia    • Hypertension        Past Surgical History:   Procedure Laterality Date   • HIP BIPOLAR REPLACEMENT     • HYSTERECTOMY      Partial       Family History   Problem Relation Age of Onset   • Hypertension Mother    • Hypertension Father        Social History     Socioeconomic History   • Marital status:    Tobacco Use   • Smoking status: Never Smoker   • Smokeless tobacco: Never Used   Vaping Use   • Vaping Use: Never used   Substance and Sexual Activity   • Alcohol use: Never   • Drug use: Never   • Sexual activity: Defer       Allergies   Allergen Reactions   • Silicone Rash       Prior to Admission medications      Current Outpatient Medications   Medication Instructions   • cholecalciferol (VITAMIN D3) 1,000 Units, Oral, Daily   • cloNIDine (CATAPRES) 0.1 mg, Oral, 3 Times Daily   • furosemide (LASIX) 40 mg, Oral, As Needed   • hydroCHLOROthiazide  "(HYDRODIURIL) 50 mg, Oral, Daily   • KRILL OIL PO Oral, Daily   • lisinopril (PRINIVIL,ZESTRIL) 20 mg, Oral, 2 times daily   • metoprolol succinate XL (TOPROL-XL) 100 mg, Oral, Daily   • multivitamin with minerals tablet tablet 1 tablet, Oral, Daily, Multi-vitamin along with a Preservision   • omeprazole (PRILOSEC) 20 mg, Oral, Daily   • simvastatin (ZOCOR) 20 mg, Oral, Nightly   • vitamin C (ASCORBIC ACID) 1,000 mg, Oral, Daily   • Zinc 50 MG capsule Oral         Review of Systems   Constitutional: Negative.    HENT: Negative.    Eyes: Negative.    Respiratory: Negative.    Cardiovascular: Positive for leg swelling.        Leg cramping   Gastrointestinal: Negative.    Endocrine: Negative.    Genitourinary: Negative.    Musculoskeletal: Negative.    Skin: Negative.    Allergic/Immunologic: Negative.    Neurological: Negative.    Hematological: Negative.    Psychiatric/Behavioral: Negative.        /78   Pulse 57   Ht 152.4 cm (60\")   Wt 79.4 kg (175 lb)   SpO2 96%   BMI 34.18 kg/m²   Physical Exam        US Venous Doppler Lower Extremity Bilateral (duplex)    Result Date: 7/11/2022  Narrative: US VENOUS DOPPLER LOWER EXTREMITY BILATERAL (DUPLEX)- 7/11/2022 1:26 PM CDT  HISTORY: bilateral lower extremity edema; R60.0-Localized edema  FINDINGS: COMPARISON: NONE  FINDINGS: Transverse and longitudinal grayscale, color Doppler and spectral images of the bilateral lower extremities were obtained. Venous insufficiency exam. Additional grayscale, color Doppler, spectral analysis performed of the bilateral lower extremity deep veins from the distal common femoral through the proximal anterior posterior tibial and peroneal veins performed.  There is normal color Doppler flow, compressibility, and augmentation of the deep veins of the bilateral common femoral, superficial femoral, deep profundus, popliteal veins. Appropriate color Doppler flow within the bilateral anterior and posterior tibial as well as peroneal " veins. No evidence of deep vein thrombosis within the bilateral lower extremities at this time.  RIGHT LOWER EXTREMITY:  GSV-SFJ: 6.9 mm. 1233 ms reflux GSV -Mid thigh: 3.8 mm. No reflux. GSV- Above-knee: 3.0 mm. No reflux. GSV- midcalf: 2.3 mm.  2117 ms reflux GSV- ankle: 2.1 mm. No reflux.  No venous thrombus identified in the greater saphenous vein.  Suboptimal visualization of the right lower extremity lesser saphenous vein.  LEFT LOWER EXTREMITY:  GSV-SFJ: 4.4 mm. No reflux. GSV -Mid thigh: 3.3 mm. 1728 ms reflux GSV- Above-knee: 3.3 mm. 2717 ms reflux GSV- midcalf: 1.7 mm. No reflux. GSV- ankle: 2.3 mm. 1256 ms reflux  No venous thrombus identified in the greater saphenous vein.  LSV-knee: 3.0 mm.  No reflux. LSV-mid calf: 2.0 mm. 1561 ms reflux LSV-ankle: 1.6 mm. No reflux.  No venous thrombus identified in the lesser saphenous vein.      Impression: 1. No evidence of deep vein thrombosis within the bilateral lower extremities at this time. 2. There is venous reflux/insufficiency within the bilateral greater saphenous veins and the left mid lesser saphenous the level of the midcalf with measurements provided above. Suboptimal visualization the right lower extremity lesser saphenous vein on today's exam. This report was finalized on 07/11/2022 17:37 by Dr. Lucia Austin MD.      Patient Active Problem List   Diagnosis   • Essential hypertension   • WILLSON (dyspnea on exertion)   • Mixed hyperlipidemia   • Pedal edema   • COVID-19 vaccine series completed   • KATY treated with BiPAP   • Class 1 obesity due to excess calories with serious comorbidity and body mass index (BMI) of 34.0 to 34.9 in adult         ICD-10-CM ICD-9-CM   1. Chronic venous hypertension with inflammation involving both sides  I87.323 459.32   2. Essential hypertension  I10 401.9   3. Mixed hyperlipidemia  E78.2 272.2           Plan: After thoroughly evaluating Esha Boogie, I believe the best course of action is to initially remain  conservative from a vascular standpoint.  I will give the patient a prescription for compression stockings in the 20-30 mm pressure gradient range.  I did instruct the patient on how to wear these on a daily basis.  I would like her to keep her legs elevated when she is not on them, and keep her legs well moisturized.  We will see the patient back in 3 months to see how she is doing with conservative therapy.  I did review her testing which does show significant venous insufficiency to her lower extremities and she is a candidate for endovenous closures. I did discuss vascular risk factors as they pertain to the progression of vascular disease including controlling her hypertension and hyperlipidemia.  Her blood pressure stable in office on her current medications.  She is maintained on Zocor for hyperlipidemia.  The patient can continue taking their current medication regimen as previously planned.  This was all discussed in full with complete understanding.    Thank you for allowing me to participate in the care of your patient.  Please do not hesitate with any questions or concerns.  I will keep you aware of any further encounters with Esha Boogie.        Sincerely yours,         CASSIDY Loredo

## 2022-07-19 ENCOUNTER — TELEPHONE (OUTPATIENT)
Dept: VASCULAR SURGERY | Facility: CLINIC | Age: 72
End: 2022-07-19

## 2022-07-19 NOTE — TELEPHONE ENCOUNTER
Pt called to discuss her stockings. She stated she was having trouble with them. I explained that I would have the nurse call her back after clinic. Pt expressed understanding.

## 2022-07-20 ENCOUNTER — TELEPHONE (OUTPATIENT)
Dept: VASCULAR SURGERY | Facility: CLINIC | Age: 72
End: 2022-07-20

## 2022-07-20 NOTE — TELEPHONE ENCOUNTER
Called the patient and discuss her stockings.  She said that the foot was too big and it caused issues at her ankle and further up on her leg.  I told her that Viktoria wanted her to go back to [facility] to get re-measured.  I told her that she should take them with her and put one on and show them how they are fitting, it might help.  In addition, she stated that she was going to contact the insurance co to see if she could discuss having the procedure with them. She is going to call to let us know.

## 2022-09-13 ENCOUNTER — HOSPITAL ENCOUNTER (OUTPATIENT)
Dept: CARDIOLOGY | Facility: HOSPITAL | Age: 72
Discharge: HOME | End: 2022-09-13
Admitting: NURSE PRACTITIONER

## 2022-09-13 DIAGNOSIS — R60.0 PEDAL EDEMA: ICD-10-CM

## 2022-09-13 PROCEDURE — 93306 TTE W/DOPPLER COMPLETE: CPT | Performed by: INTERNAL MEDICINE

## 2022-09-13 PROCEDURE — 93306 TTE W/DOPPLER COMPLETE: CPT

## 2022-09-15 PROBLEM — I87.2 VENOUS INSUFFICIENCY: Status: ACTIVE | Noted: 2021-07-26

## 2022-09-15 NOTE — PROGRESS NOTES
Chief Complaint  Hypertension (8wk F/U. Increased HCTZ LOV), Edema, and Results (Echo/US)    Subjective          Esha Boogie presents to [facility] [id number] for routine follow-up of medication adjustments and outpatient testing.  HCTZ was increased to 50 mg daily at her last office visit on 7/11/2022 for uncontrolled hypertension.  Bilateral lower extremity venous ultrasound on 7/11/2022 revealed no evidence of DVT in bilateral lower extremity veins, however venous insufficiency was noted in bilateral greater saphenous veins in the left mid lesser saphenous at the level of mid calf.  2D echo on 9/13/2022 revealed normal left ventricular systolic function with ejection fraction of 61-65%, grade 2 left ventricular diastolic dysfunction and mild pulmonary hypertension. She has hypertension, hyperlipidemia, obstructive sleep apnea, GERD, venous insufficiency and obesity. She complains of chronic bilateral lower extremity edema. She reports occasional palpitations. Patient denies chest pain, shortness of breath, dizziness, syncope, orthopnea, PND or decreased stamina.  Patient denies any signs of bleeding.    Hypertension  This is a chronic problem. The current episode started more than 1 year ago. The problem is uncontrolled. Associated symptoms include palpitations and peripheral edema. Pertinent negatives include no anxiety, blurred vision, chest pain, headaches, malaise/fatigue, neck pain, orthopnea, PND, shortness of breath or sweats. Risk factors for coronary artery disease include obesity, dyslipidemia and post-menopausal state. Current antihypertension treatment includes central alpha agonists, ACE inhibitors, beta blockers and calcium channel blockers. The current treatment provides significant improvement. Identifiable causes of hypertension include sleep apnea.   Hyperlipidemia  This is a chronic problem. The current episode started more than 1 year ago. The problem is  "controlled. Exacerbating diseases include obesity. Pertinent negatives include no chest pain or shortness of breath. Current antihyperlipidemic treatment includes statins. Risk factors for coronary artery disease include hypertension, obesity, post-menopausal and dyslipidemia.     I have reviewed and confirmed the accuracy of the ROS  CASSIDY Rodriguez        Objective     Current Outpatient Medications:   •  cloNIDine (Catapres) 0.1 MG tablet, Take 1 tablet by mouth 3 (Three) Times a Day., Disp: 270 tablet, Rfl: 3  •  metoprolol succinate XL (TOPROL-XL) 100 MG 24 hr tablet, Take 100 mg by mouth Daily., Disp: , Rfl:   •  multivitamin with minerals tablet tablet, Take 1 tablet by mouth Daily. Multi-vitamin along with a Preservision, Disp: , Rfl:   •  omeprazole (priLOSEC) 20 MG capsule, Take 20 mg by mouth Daily., Disp: , Rfl:   •  simvastatin (ZOCOR) 20 MG tablet, Take 20 mg by mouth Every Night., Disp: , Rfl:   •  vitamin C (ASCORBIC ACID) 250 MG tablet, Take 1,000 mg by mouth Daily., Disp: , Rfl:   •  [START ON 9/21/2022] sacubitril-valsartan (Entresto) 49-51 MG tablet, Take 1 tablet by mouth 2 (Two) Times a Day., Disp: 60 tablet, Rfl: 11  Vital Signs:   /88   Pulse 55   Ht 152.4 cm (60\")   Wt 80.7 kg (178 lb)   SpO2 100%   BMI 34.76 kg/m²     Vitals and nursing note reviewed.   Constitutional:       General: Not in acute distress.     Appearance: Normal and healthy appearance. Well-developed and not in distress. Obese. Not diaphoretic.   Eyes:      General: Lids are normal.         Right eye: No discharge.         Left eye: No discharge.      Conjunctiva/sclera: Conjunctivae normal.      Pupils: Pupils are equal, round, and reactive to light.   HENT:      Head: Normocephalic and atraumatic.      Jaw: There is normal jaw occlusion.      Right Ear: External ear normal.      Left Ear: External ear normal.      Nose: Nose normal.   Neck:      Thyroid: No thyromegaly.      Vascular: No carotid bruit, " JVD or JVR. JVD normal.      Trachea: Trachea normal. No tracheal deviation.   Pulmonary:      Effort: Pulmonary effort is normal. No respiratory distress.      Breath sounds: Normal breath sounds. No decreased breath sounds. No wheezing. No rhonchi. No rales.   Chest:      Chest wall: Not tender to palpatation.   Cardiovascular:      PMI at left midclavicular line. Bradycardia present. Regular rhythm. Normal S1. Normal S2.      Murmurs: There is no murmur.      No gallop. No click. No rub.   Pulses:     Intact distal pulses. No decreased pulses.   Edema:     Peripheral edema present.     Pretibial: bilateral 2+ edema of the pretibial area.     Ankle: bilateral 2+ edema of the ankle.     Feet: bilateral 2+ edema of the feet.  Abdominal:      General: Bowel sounds are normal. There is no distension.      Palpations: Abdomen is soft.      Tenderness: There is no abdominal tenderness.   Musculoskeletal: Normal range of motion.         General: No tenderness or deformity.      Cervical back: Normal range of motion and neck supple. Skin:     General: Skin is warm and dry.      Coloration: Skin is not pale.      Findings: No erythema or rash.   Neurological:      General: No focal deficit present.      Mental Status: Alert, oriented to person, place, and time and oriented to person, place and time.   Psychiatric:         Attention and Perception: Attention and perception normal.         Mood and Affect: Mood and affect normal.         Speech: Speech normal.         Behavior: Behavior normal.         Thought Content: Thought content normal.         Cognition and Memory: Cognition and memory normal.         Judgment: Judgment normal.        Result Review :   The following data was reviewed by: CASSIDY Rodriguez on 9/19/2022:    Radiology exam- bilateral lower extremity venous ultrasound 7/11/22 and cardiology exam- 2d echo 9/13/22         Assessment and Plan    Diagnoses and all orders for this visit:    1. Essential  hypertension (Primary)-blood pressure is elevated in office today. Continue to monitor following medication adjustments below. Continue clonidine and metoprolol succinate.  Monitor and record daily blood pressure. Report readings consistently higher than 130/90 or consistently lower than 100/60.     2. Mixed hyperlipidemia-management per PCP.  Continue simvastatin.    3. KATY treated with BiPAP-patient reports compliance with BiPAP.  Stable.    4. Class 1 obesity due to excess calories with serious comorbidity and body mass index (BMI) of 34.0 to 34.9 in adult- BMI is >= 30 and <35. (Class 1 Obesity). The following options were offered after discussion;: weight loss educational material (shared in after visit summary).     5.  Venous insufficiency-present on bilateral lower extremity venous ultrasound 7/11/2022.  Pt is following with Dr. Serafin Sher with vascular surgery.     6. Chronic diastolic congestive heart failure (HCC)- NYHA class II. Compensated. Stop lisinopril and HCTZ. Start Entresto 49/51 mg twice daily on the morning of Wednesday 9/21/22 following 36 hour washout period. BMP in one week. Reviewed signs and symptoms of CHF and what to report with the patient. Patient instructed to restrict sodium and weigh daily. Report weight gain of greater than 2 lbs overnight or 5 lbs in 1 week. Pt verbalized understanding of instructions and plan of care. Consider up titration of Entresto and/or initiation of Jardiance in the future, if tolerated.       Follow Up   Return in about 4 weeks (around 10/17/2022) for Next scheduled follow up.  Patient was given instructions and counseling regarding her condition or for health maintenance advice. Please see specific information pulled into the AVS if appropriate.

## 2022-09-17 LAB
BH CV ECHO MEAS - AO MAX PG: 15.5 MMHG
BH CV ECHO MEAS - AO MEAN PG: 8 MMHG
BH CV ECHO MEAS - AO V2 MAX: 197 CM/SEC
BH CV ECHO MEAS - AO V2 VTI: 48.9 CM
BH CV ECHO MEAS - EDV(CUBED): 85.2 ML
BH CV ECHO MEAS - EDV(MOD-SP2): 54 ML
BH CV ECHO MEAS - EDV(MOD-SP4): 61 ML
BH CV ECHO MEAS - EF(MOD-BP): 59 %
BH CV ECHO MEAS - EF(MOD-SP2): 66.7 %
BH CV ECHO MEAS - EF(MOD-SP4): 52.5 %
BH CV ECHO MEAS - ESV(CUBED): 10.6 ML
BH CV ECHO MEAS - ESV(MOD-SP2): 18 ML
BH CV ECHO MEAS - ESV(MOD-SP4): 29 ML
BH CV ECHO MEAS - FS: 50 %
BH CV ECHO MEAS - IVS/LVPW: 1.14 CM
BH CV ECHO MEAS - IVSD: 0.8 CM
BH CV ECHO MEAS - LA DIMENSION: 3.1 CM
BH CV ECHO MEAS - LAT PEAK E' VEL: 7.9 CM/SEC
BH CV ECHO MEAS - LV DIASTOLIC VOL/BSA (35-75): 34.6 CM2
BH CV ECHO MEAS - LV MASS(C)D: 100.6 GRAMS
BH CV ECHO MEAS - LV MAX PG: 7.8 MMHG
BH CV ECHO MEAS - LV MEAN PG: 3 MMHG
BH CV ECHO MEAS - LV SYSTOLIC VOL/BSA (12-30): 16.4 CM2
BH CV ECHO MEAS - LV V1 MAX: 140 CM/SEC
BH CV ECHO MEAS - LV V1 VTI: 30.9 CM
BH CV ECHO MEAS - LVIDD: 4.4 CM
BH CV ECHO MEAS - LVIDS: 2.2 CM
BH CV ECHO MEAS - LVPWD: 0.7 CM
BH CV ECHO MEAS - MED PEAK E' VEL: 6.73 CM/SEC
BH CV ECHO MEAS - MR MAX PG: 42.6 MMHG
BH CV ECHO MEAS - MR MAX VEL: 318.8 CM/SEC
BH CV ECHO MEAS - MV A MAX VEL: 89.8 CM/SEC
BH CV ECHO MEAS - MV DEC TIME: 0.28 MSEC
BH CV ECHO MEAS - MV E MAX VEL: 130 CM/SEC
BH CV ECHO MEAS - MV E/A: 1.45
BH CV ECHO MEAS - SI(MOD-SP2): 20.4 ML/M2
BH CV ECHO MEAS - SI(MOD-SP4): 18.1 ML/M2
BH CV ECHO MEAS - SV(MOD-SP2): 36 ML
BH CV ECHO MEAS - SV(MOD-SP4): 32 ML
BH CV ECHO MEAS - TR MAX PG: 34.8 MMHG
BH CV ECHO MEAS - TR MAX VEL: 295 CM/SEC
BH CV ECHO MEASUREMENTS AVERAGE E/E' RATIO: 17.77
LEFT ATRIUM VOLUME INDEX: 26.7 ML/M2
LEFT ATRIUM VOLUME: 47 ML
MAXIMAL PREDICTED HEART RATE: 147 BPM
STRESS TARGET HR: 125 BPM

## 2022-09-19 ENCOUNTER — OFFICE VISIT (OUTPATIENT)
Dept: CARDIOLOGY | Facility: CLINIC | Age: 72
End: 2022-09-19

## 2022-09-19 VITALS
SYSTOLIC BLOOD PRESSURE: 142 MMHG | BODY MASS INDEX: 34.95 KG/M2 | DIASTOLIC BLOOD PRESSURE: 88 MMHG | WEIGHT: 178 LBS | OXYGEN SATURATION: 100 % | HEIGHT: 60 IN | HEART RATE: 55 BPM

## 2022-09-19 DIAGNOSIS — G47.33 OSA TREATED WITH BIPAP: ICD-10-CM

## 2022-09-19 DIAGNOSIS — E78.2 MIXED HYPERLIPIDEMIA: ICD-10-CM

## 2022-09-19 DIAGNOSIS — I50.32 CHRONIC DIASTOLIC CONGESTIVE HEART FAILURE: ICD-10-CM

## 2022-09-19 DIAGNOSIS — I87.2 VENOUS INSUFFICIENCY: ICD-10-CM

## 2022-09-19 DIAGNOSIS — I10 ESSENTIAL HYPERTENSION: Primary | ICD-10-CM

## 2022-09-19 DIAGNOSIS — E66.09 CLASS 1 OBESITY DUE TO EXCESS CALORIES WITH SERIOUS COMORBIDITY AND BODY MASS INDEX (BMI) OF 34.0 TO 34.9 IN ADULT: ICD-10-CM

## 2022-09-19 PROCEDURE — 99214 OFFICE O/P EST MOD 30 MIN: CPT | Performed by: NURSE PRACTITIONER

## 2022-09-19 RX ORDER — SACUBITRIL AND VALSARTAN 49; 51 MG/1; MG/1
1 TABLET, FILM COATED ORAL 2 TIMES DAILY
Qty: 60 TABLET | Refills: 11 | Status: SHIPPED | OUTPATIENT
Start: 2022-09-21 | End: 2022-09-29 | Stop reason: DRUGHIGH

## 2022-09-26 ENCOUNTER — TELEPHONE (OUTPATIENT)
Dept: CARDIOLOGY | Facility: CLINIC | Age: 72
End: 2022-09-26

## 2022-09-26 NOTE — TELEPHONE ENCOUNTER
Unsure of BP and how long patient HR has been running in the 50's. Her LOV was 09/19/2022, no mention of HR. VM has been left for patient to call office.

## 2022-09-26 NOTE — TELEPHONE ENCOUNTER
----- Message from Esha Boogie sent at 9/25/2022  5:25 PM CDT -----  Regarding: my heart rate  My heart rate has been in the 50's  repeatedly & some in low 60's . Is this a concern?  Will taking another clonidine for high blood pressure bring my heart rate down further? With my new Dx should I try to get in sooner with Vascular  on October 19?   Thank you,Esha Boogie

## 2022-09-27 NOTE — TELEPHONE ENCOUNTER
I spoke with patient. HR's in 50's-60's is not new for patient. She was concerned that if she took her Clonidine that it would lower her HR so she has not been taking and as a result her BP was high. I encouraged her to take her Clonidine as directed. She was in understanding.

## 2022-09-28 PROBLEM — I50.32 CHRONIC DIASTOLIC CONGESTIVE HEART FAILURE: Status: ACTIVE | Noted: 2022-09-28

## 2022-09-28 NOTE — PROGRESS NOTES
Chief Complaint  Hypertension (4 WK f/u.  Reports having HTN since Sunday and hasn't felt herself, has a headache.  BP high when she first wakes up.  Had to take 2 clonidine yesterday.)    Subjective          Esha Boogie presents to [facility]IOLOG[facility] [id number] for routine follow-up of medication adjustments. Lisinopril and HCTZ were discontinued and she was started on Entresto 49/51 mg twice daily following her last office visit on 9/19/2022.  Follow-up BMP on 9/26/2022 revealed normal creatinine and potassium.  She reports elevated blood pressures since medication adjustments were made at her last visit. She has chronic diastolic congestive heart failure, hypertension, hyperlipidemia, obstructive sleep apnea, GERD, venous insufficiency and obesity. She complains of chronic bilateral lower extremity edema. She reports occasional palpitations. Patient denies chest pain, shortness of breath, dizziness, syncope, orthopnea, PND or decreased stamina.  Patient denies any signs of bleeding.    Hypertension  This is a chronic problem. The current episode started more than 1 year ago. The problem is uncontrolled. Associated symptoms include palpitations and peripheral edema. Pertinent negatives include no anxiety, blurred vision, chest pain, headaches, malaise/fatigue, neck pain, orthopnea, PND, shortness of breath or sweats. Risk factors for coronary artery disease include obesity, dyslipidemia and post-menopausal state. Current antihypertension treatment includes central alpha agonists, ACE inhibitors, beta blockers and calcium channel blockers. The current treatment provides significant improvement. Identifiable causes of hypertension include sleep apnea.   Hyperlipidemia  This is a chronic problem. The current episode started more than 1 year ago. The problem is controlled. Exacerbating diseases include obesity. Pertinent negatives include no chest pain or shortness of breath. Current  "antihyperlipidemic treatment includes statins. Risk factors for coronary artery disease include hypertension, obesity, post-menopausal and dyslipidemia.   Congestive Heart Failure  Presents for follow-up visit. Associated symptoms include edema and palpitations. Pertinent negatives include no abdominal pain, chest pain, chest pressure, claudication, fatigue, muscle weakness, near-syncope, nocturia, orthopnea, paroxysmal nocturnal dyspnea, shortness of breath or unexpected weight change. The symptoms have been stable. Compliance with total regimen is 51-75%. Compliance with diet is 51-75%. Compliance with exercise is 51-75%. Compliance with medications is %.     I have reviewed and confirmed the accuracy of the ROS  CASSIDY Rodriguez          Objective     Current Outpatient Medications:   •  cloNIDine (Catapres) 0.1 MG tablet, Take 1 tablet by mouth 3 (Three) Times a Day., Disp: 270 tablet, Rfl: 3  •  metoprolol succinate XL (TOPROL-XL) 100 MG 24 hr tablet, Take 100 mg by mouth Daily., Disp: , Rfl:   •  multivitamin with minerals tablet tablet, Take 1 tablet by mouth Daily. Multi-vitamin along with a Preservision, Disp: , Rfl:   •  omeprazole (priLOSEC) 20 MG capsule, Take 20 mg by mouth Daily., Disp: , Rfl:   •  simvastatin (ZOCOR) 20 MG tablet, Take 20 mg by mouth Every Night., Disp: , Rfl:   •  vitamin C (ASCORBIC ACID) 250 MG tablet, Take 1,000 mg by mouth Daily., Disp: , Rfl:   •  sacubitril-valsartan (Entresto)  MG tablet, Take 1 tablet by mouth 2 (Two) Times a Day., Disp: 60 tablet, Rfl: 11  Vital Signs:   /98   Pulse 55   Ht 152.4 cm (60\")   Wt 81.4 kg (179 lb 6.4 oz)   SpO2 93%   BMI 35.04 kg/m²     Vitals and nursing note reviewed.   Constitutional:       General: Not in acute distress.     Appearance: Normal and healthy appearance. Well-developed and not in distress. Obese. Not diaphoretic.   Eyes:      General: Lids are normal.         Right eye: No discharge.         Left " eye: No discharge.      Conjunctiva/sclera: Conjunctivae normal.      Pupils: Pupils are equal, round, and reactive to light.   HENT:      Head: Normocephalic and atraumatic.      Jaw: There is normal jaw occlusion.      Right Ear: External ear normal.      Left Ear: External ear normal.      Nose: Nose normal.   Neck:      Thyroid: No thyromegaly.      Vascular: No carotid bruit, JVD or JVR. JVD normal.      Trachea: Trachea normal. No tracheal deviation.   Pulmonary:      Effort: Pulmonary effort is normal. No respiratory distress.      Breath sounds: Normal breath sounds. No decreased breath sounds. No wheezing. No rhonchi. No rales.   Chest:      Chest wall: Not tender to palpatation.   Cardiovascular:      PMI at left midclavicular line. Bradycardia present. Regular rhythm. Normal S1. Normal S2.      Murmurs: There is no murmur.      No gallop. No click. No rub.   Pulses:     Intact distal pulses. No decreased pulses.   Edema:     Peripheral edema present.     Pretibial: bilateral 1+ edema of the pretibial area.     Ankle: bilateral 1+ edema of the ankle.     Feet: bilateral 1+ edema of the feet.  Abdominal:      General: Bowel sounds are normal. There is no distension.      Palpations: Abdomen is soft.      Tenderness: There is no abdominal tenderness.   Musculoskeletal: Normal range of motion.         General: No tenderness or deformity.      Cervical back: Normal range of motion and neck supple. Skin:     General: Skin is warm and dry.      Coloration: Skin is not pale.      Findings: No erythema or rash.   Neurological:      General: No focal deficit present.      Mental Status: Alert, oriented to person, place, and time and oriented to person, place and time.   Psychiatric:         Attention and Perception: Attention and perception normal.         Mood and Affect: Mood and affect normal.         Speech: Speech normal.         Behavior: Behavior normal.         Thought Content: Thought content normal.          Cognition and Memory: Cognition and memory normal.         Judgment: Judgment normal.        Result Review :   The following data was reviewed by: CASSIDY Rodriguez on 9/29/2022:    Radiology exam- bilateral lower extremity venous ultrasound 7/11/22 and cardiology exam- 2d echo 9/13/22         Assessment and Plan    Diagnoses and all orders for this visit:    1. Essential hypertension (Primary)-blood pressures have been elevated. Continue to monitor following medication adjustments below. Continue clonidine and metoprolol succinate.  Monitor and record daily blood pressure. Report readings consistently higher than 130/90 or consistently lower than 100/60.     2. Mixed hyperlipidemia-management per PCP.  Continue simvastatin.    3. KATY treated with BiPAP-patient reports compliance with BiPAP.  Stable.    4. Class 2 severe obesity due to excess calories with serious comorbidity and body mass index (BMI) of 35.0 to 35.9 in adult (HCC)- BMI is >= 30 and <35. (Class 1 Obesity). The following options were offered after discussion;: weight loss educational material (shared in after visit summary).     5.  Venous insufficiency-present on bilateral lower extremity venous ultrasound 7/11/2022.  Pt is following with Dr. Serafin Sher with vascular surgery.     6. Chronic diastolic congestive heart failure (HCC)- NYHA class II. Compensated. Increase Entresto to 97/103 mg twice daily . BMP in one week. Reviewed signs and symptoms of CHF and what to report with the patient. Patient instructed to restrict sodium and weigh daily. Report weight gain of greater than 2 lbs overnight or 5 lbs in 1 week. Pt verbalized understanding of instructions and plan of care. Consider initiation of Farxiga or Jardiance in the future, if tolerated.       Follow Up   Return in about 4 weeks (around 10/27/2022) for Next scheduled follow up.  Patient was given instructions and counseling regarding her condition or for health maintenance  advice. Please see specific information pulled into the AVS if appropriate.

## 2022-09-28 NOTE — TELEPHONE ENCOUNTER
Patient called and left voicemail concerned about her blood pressure running 173/85. Patient states that she has already taken one clonidine and was concerned about taking another one since that would only leave her one more for today. I attempted to reach patient but had to leave her a voicemail. Waiting for patient to return my call. WF

## 2022-09-29 ENCOUNTER — OFFICE VISIT (OUTPATIENT)
Dept: CARDIOLOGY | Facility: CLINIC | Age: 72
End: 2022-09-29

## 2022-09-29 VITALS
HEART RATE: 55 BPM | DIASTOLIC BLOOD PRESSURE: 98 MMHG | OXYGEN SATURATION: 93 % | WEIGHT: 179.4 LBS | BODY MASS INDEX: 35.22 KG/M2 | HEIGHT: 60 IN | SYSTOLIC BLOOD PRESSURE: 154 MMHG

## 2022-09-29 DIAGNOSIS — I10 ESSENTIAL HYPERTENSION: Primary | ICD-10-CM

## 2022-09-29 DIAGNOSIS — I50.32 CHRONIC DIASTOLIC CONGESTIVE HEART FAILURE: ICD-10-CM

## 2022-09-29 DIAGNOSIS — I87.2 VENOUS INSUFFICIENCY: ICD-10-CM

## 2022-09-29 DIAGNOSIS — E78.2 MIXED HYPERLIPIDEMIA: ICD-10-CM

## 2022-09-29 DIAGNOSIS — E66.01 CLASS 2 SEVERE OBESITY DUE TO EXCESS CALORIES WITH SERIOUS COMORBIDITY AND BODY MASS INDEX (BMI) OF 35.0 TO 35.9 IN ADULT: ICD-10-CM

## 2022-09-29 DIAGNOSIS — G47.33 OSA TREATED WITH BIPAP: ICD-10-CM

## 2022-09-29 PROBLEM — E66.812 CLASS 2 SEVERE OBESITY DUE TO EXCESS CALORIES WITH SERIOUS COMORBIDITY AND BODY MASS INDEX (BMI) OF 35.0 TO 35.9 IN ADULT: Status: ACTIVE | Noted: 2022-01-11

## 2022-09-29 PROCEDURE — 99214 OFFICE O/P EST MOD 30 MIN: CPT | Performed by: NURSE PRACTITIONER

## 2022-09-29 RX ORDER — SACUBITRIL AND VALSARTAN 97; 103 MG/1; MG/1
1 TABLET, FILM COATED ORAL 2 TIMES DAILY
Qty: 60 TABLET | Refills: 11 | Status: SHIPPED | OUTPATIENT
Start: 2022-09-29 | End: 2022-10-04 | Stop reason: SINTOL

## 2022-10-02 ENCOUNTER — NURSE TRIAGE (OUTPATIENT)
Dept: CALL CENTER | Facility: HOSPITAL | Age: 72
End: 2022-10-02

## 2022-10-02 NOTE — TELEPHONE ENCOUNTER
Increased the dose of Entresto 1 week ago. Now has a rash on her neck thinks that maybe a side effect. I explained that was not one of the side effects Asked him to consder anything else that may be new or different and to apply hydrocortisone cream to the area

## 2022-10-04 ENCOUNTER — TELEPHONE (OUTPATIENT)
Dept: CARDIOLOGY | Facility: CLINIC | Age: 72
End: 2022-10-04

## 2022-10-04 RX ORDER — FUROSEMIDE 40 MG/1
40 TABLET ORAL DAILY PRN
Qty: 90 TABLET | Refills: 3 | Status: SHIPPED | OUTPATIENT
Start: 2022-10-04

## 2022-10-04 RX ORDER — LISINOPRIL 40 MG/1
40 TABLET ORAL DAILY
Qty: 90 TABLET | Refills: 3 | Status: SHIPPED | OUTPATIENT
Start: 2022-10-04

## 2022-10-04 NOTE — TELEPHONE ENCOUNTER
----- Message from CASSIDY Rodriguez sent at 10/4/2022 11:58 AM CDT -----  Regarding: FW: my heart rate  Please let the pt know that she can resume lisinopril 40 mg daily when she has been off of Entresto for at least 36 hours. I have sent a prescription for lisinopril to her pharmacy.   ----- Message -----  From: Lucia Watkins RN  Sent: 10/4/2022   8:25 AM CDT  To: CASSIDY Rodriguez  Subject: FW: my heart rate                                  ----- Message -----  From: Esha Boogie  Sent: 10/4/2022   6:16 AM CDT  To: [facility]  Subject: my heart rate                                    Good morning, I have had some other reactions to entresto.  During the night both lower legs felt like they were on fire & I had a couple red spot on my leg. My legs were very red. I put cortisone cream on them.  Also I've had itching  on my scalp , vaginal itching & on my legs. You can let me know about the medicine I need to be on as I will not be able to stay on entresto. I do have sensitive skin.  I can only get your response thru my home computer as my phone doesn't work with having to put in the code #.  I would like a phone call on response to this as we will be in [place] this morning. [phone]. Thank you, Esha

## 2022-10-04 NOTE — TELEPHONE ENCOUNTER
Call placed to patient to start Lisinopril 40 mg after 36 hour Entresto wash out. Patient V/U. Requesting refill of of HCTZ as taken previously.

## 2022-10-05 ENCOUNTER — OFFICE VISIT (OUTPATIENT)
Dept: CARDIOLOGY | Facility: CLINIC | Age: 72
End: 2022-10-05

## 2022-10-05 VITALS
HEART RATE: 68 BPM | SYSTOLIC BLOOD PRESSURE: 174 MMHG | HEIGHT: 60 IN | BODY MASS INDEX: 34.95 KG/M2 | OXYGEN SATURATION: 98 % | DIASTOLIC BLOOD PRESSURE: 104 MMHG | WEIGHT: 178 LBS

## 2022-10-05 DIAGNOSIS — E78.2 MIXED HYPERLIPIDEMIA: ICD-10-CM

## 2022-10-05 DIAGNOSIS — E66.01 CLASS 2 SEVERE OBESITY DUE TO EXCESS CALORIES WITH SERIOUS COMORBIDITY AND BODY MASS INDEX (BMI) OF 35.0 TO 35.9 IN ADULT: ICD-10-CM

## 2022-10-05 DIAGNOSIS — I50.32 CHRONIC DIASTOLIC CONGESTIVE HEART FAILURE: ICD-10-CM

## 2022-10-05 DIAGNOSIS — I87.2 VENOUS INSUFFICIENCY: ICD-10-CM

## 2022-10-05 DIAGNOSIS — I10 ESSENTIAL HYPERTENSION: Primary | ICD-10-CM

## 2022-10-05 DIAGNOSIS — G47.33 OSA TREATED WITH BIPAP: ICD-10-CM

## 2022-10-05 PROCEDURE — 99214 OFFICE O/P EST MOD 30 MIN: CPT | Performed by: NURSE PRACTITIONER

## 2022-10-05 PROCEDURE — 93000 ELECTROCARDIOGRAM COMPLETE: CPT | Performed by: NURSE PRACTITIONER

## 2022-10-05 RX ORDER — AMLODIPINE BESYLATE 2.5 MG/1
2.5 TABLET ORAL DAILY
Qty: 30 TABLET | Refills: 11 | Status: SHIPPED | OUTPATIENT
Start: 2022-10-05

## 2022-10-05 RX ORDER — HYDROCHLOROTHIAZIDE 50 MG/1
50 TABLET ORAL DAILY
Qty: 90 TABLET | Refills: 3 | Status: SHIPPED | OUTPATIENT
Start: 2022-10-05

## 2022-10-05 NOTE — PROGRESS NOTES
Chief Complaint  Congestive Heart Failure (Has not had any shortness of breath but has swelling in her legs and face. ) and Hypertension (Blood pressure has been high per patient.)     Subjective          Esha Boogie presents to [id number] for routine follow-up of medication adjustments.  Entresto was increased to 97/103 mg twice daily following her last office visit on 9/29/2022.  However, she contacted our office on 10/3/2022 reporting skin irritation, as well as pruritus of her scalp, legs and vagina.  Entresto was discontinued on 10/4/2022 and she was instructed to resume lisinopril 40 mg daily following 36-hour washout. She has chronic diastolic congestive heart failure, hypertension, hyperlipidemia, obstructive sleep apnea, GERD, venous insufficiency and obesity. She complains of chronic bilateral lower extremity edema. She reports occasional palpitations. Patient denies chest pain, shortness of breath, dizziness, syncope, orthopnea, PND or decreased stamina.  Patient denies any signs of bleeding.    Hypertension  This is a chronic problem. The current episode started more than 1 year ago. The problem is uncontrolled. Associated symptoms include palpitations and peripheral edema. Pertinent negatives include no anxiety, blurred vision, chest pain, headaches, malaise/fatigue, neck pain, orthopnea, PND, shortness of breath or sweats. Risk factors for coronary artery disease include obesity, dyslipidemia and post-menopausal state. Current antihypertension treatment includes central alpha agonists, ACE inhibitors, beta blockers and diuretics. The current treatment provides significant improvement. Hypertensive end-organ damage includes heart failure. Identifiable causes of hypertension include sleep apnea.   Hyperlipidemia  This is a chronic problem. The current episode started more than 1 year ago. The problem is controlled. Exacerbating diseases include obesity. Pertinent  negatives include no chest pain or shortness of breath. Current antihyperlipidemic treatment includes statins. Risk factors for coronary artery disease include hypertension, obesity, post-menopausal and dyslipidemia.   Congestive Heart Failure  Presents for follow-up visit. Associated symptoms include edema and palpitations. Pertinent negatives include no abdominal pain, chest pain, chest pressure, claudication, fatigue, muscle weakness, near-syncope, nocturia, orthopnea, paroxysmal nocturnal dyspnea, shortness of breath or unexpected weight change. The symptoms have been stable. Compliance with total regimen is 51-75%. Compliance with diet is 51-75%. Compliance with exercise is 51-75%. Compliance with medications is %.         Objective     Current Outpatient Medications:   •  cloNIDine (Catapres) 0.1 MG tablet, Take 1 tablet by mouth 3 (Three) Times a Day., Disp: 270 tablet, Rfl: 3  •  amLODIPine (NORVASC) 2.5 MG tablet, Take 1 tablet by mouth Daily., Disp: 30 tablet, Rfl: 11  •  dapagliflozin Propanediol 10 MG tablet, Take 10 mg by mouth Daily., Disp: 30 tablet, Rfl: 11  •  furosemide (LASIX) 40 MG tablet, Take 1 tablet by mouth Daily As Needed (daily as needed for increased shortness of breath, swelling and/or weight gain)., Disp: 90 tablet, Rfl: 3  •  hydroCHLOROthiazide (HYDRODIURIL) 50 MG tablet, Take 1 tablet by mouth Daily., Disp: 90 tablet, Rfl: 3  •  Ibrexafungerp Citrate 150 MG tablet, Take 300 mg by mouth 2 (Two) Times a Day., Disp: 4 tablet, Rfl: 0  •  lisinopril (PRINIVIL,ZESTRIL) 40 MG tablet, Take 1 tablet by mouth Daily., Disp: 90 tablet, Rfl: 3  •  metoprolol succinate XL (TOPROL-XL) 100 MG 24 hr tablet, Take 100 mg by mouth Daily., Disp: , Rfl:   •  multivitamin with minerals tablet tablet, Take 1 tablet by mouth Daily. Multi-vitamin along with a Preservision, Disp: , Rfl:   •  omeprazole (priLOSEC) 20 MG capsule, Take 20 mg by mouth Daily., Disp: , Rfl:   •  simvastatin (ZOCOR) 20 MG  "tablet, Take 20 mg by mouth Every Night., Disp: , Rfl:   •  vitamin C (ASCORBIC ACID) 250 MG tablet, Take 1,000 mg by mouth Daily., Disp: , Rfl:   Vital Signs:   BP (!) 174/104   Pulse 68   Ht 152.4 cm (60\")   Wt 80.7 kg (178 lb)   SpO2 98%   BMI 34.76 kg/m²     Vitals and nursing note reviewed.   Constitutional:       General: Not in acute distress.     Appearance: Normal and healthy appearance. Well-developed and not in distress. Obese. Not diaphoretic.   Eyes:      General: Lids are normal.         Right eye: No discharge.         Left eye: No discharge.      Conjunctiva/sclera: Conjunctivae normal.      Pupils: Pupils are equal, round, and reactive to light.   HENT:      Head: Normocephalic and atraumatic.      Jaw: There is normal jaw occlusion.      Right Ear: External ear normal.      Left Ear: External ear normal.      Nose: Nose normal.   Neck:      Thyroid: No thyromegaly.      Vascular: No carotid bruit, JVD or JVR. JVD normal.      Trachea: Trachea normal. No tracheal deviation.   Pulmonary:      Effort: Pulmonary effort is normal. No respiratory distress.      Breath sounds: Normal breath sounds. No decreased breath sounds. No wheezing. No rhonchi. No rales.   Chest:      Chest wall: Not tender to palpatation.   Cardiovascular:      PMI at left midclavicular line. Bradycardia present. Regular rhythm. Normal S1. Normal S2.      Murmurs: There is no murmur.      No gallop. No click. No rub.   Pulses:     Intact distal pulses. No decreased pulses.   Edema:     Peripheral edema present.     Pretibial: bilateral 1+ edema of the pretibial area.     Ankle: bilateral 1+ edema of the ankle.     Feet: bilateral 1+ edema of the feet.  Abdominal:      General: Bowel sounds are normal. There is no distension.      Palpations: Abdomen is soft.      Tenderness: There is no abdominal tenderness.   Musculoskeletal: Normal range of motion.         General: No tenderness or deformity.      Cervical back: Normal range " of motion and neck supple. Skin:     General: Skin is warm and dry.      Coloration: Skin is not pale.      Findings: No erythema or rash.   Neurological:      General: No focal deficit present.      Mental Status: Alert, oriented to person, place, and time and oriented to person, place and time.   Psychiatric:         Attention and Perception: Attention and perception normal.         Mood and Affect: Mood and affect normal.         Speech: Speech normal.         Behavior: Behavior normal.         Thought Content: Thought content normal.         Cognition and Memory: Cognition and memory normal.         Judgment: Judgment normal.        Result Review :   The following data was reviewed by: CASSIDY Rodriguez on 10/05/2022:    Radiology exam- bilateral lower extremity venous ultrasound 7/11/22 and cardiology exam- 2d echo 9/13/22    ECG 12 Lead    Date/Time: 10/5/2022 3:02 PM  Performed by: Kandis Snider APRN  Authorized by: Kandis Snider APRN   Comparison: compared with previous ECG from 7/11/2022  Similar to previous ECG  Rhythm: sinus bradycardia  Rate: bradycardic  BPM: 59    Clinical impression: abnormal EKG              Assessment and Plan    Diagnoses and all orders for this visit:    1. Essential hypertension (Primary)-blood pressure remains elevated. Resume HCTZ 50 mg daily. Start amlodipine 2.5 mg daily. Continue to monitor following medication adjustments below. Continue lisinopril, clonidine and metoprolol succinate.  Monitor and record daily blood pressure. Report readings consistently higher than 130/90 or consistently lower than 100/60.     2. Mixed hyperlipidemia-management per PCP.  Continue simvastatin.    3. KATY treated with BiPAP-patient reports compliance with BiPAP.  Stable.    4. Class 2 severe obesity due to excess calories with serious comorbidity and body mass index (BMI) of 35.0 to 35.9 in adult (HCC)- BMI is >= 30 and <35. (Class 1 Obesity). The following options were  offered after discussion;: weight loss educational material (shared in after visit summary).     5.  Venous insufficiency-present on bilateral lower extremity venous ultrasound 7/11/2022.  Pt is following with Dr. Serafin Sher with vascular surgery.     6. Chronic diastolic congestive heart failure (HCC)- NYHA class II. Compensated.  Patient was unable to tolerate Entresto due to reported skin irritation and pruritus. Start Farxiga 10 mg daily. BMP in one week. Reviewed signs and symptoms of CHF and what to report with the patient. Patient instructed to restrict sodium and weigh daily. Report weight gain of greater than 2 lbs overnight or 5 lbs in 1 week. Pt verbalized understanding of instructions and plan of care.  Continue lisinopril and metoprolol succinate.       Follow Up   Return in about 4 weeks (around 11/2/2022) for Next scheduled follow up.  Patient was given instructions and counseling regarding her condition or for health maintenance advice. Please see specific information pulled into the AVS if appropriate.

## 2022-10-06 ENCOUNTER — TELEPHONE (OUTPATIENT)
Dept: CARDIOLOGY | Facility: CLINIC | Age: 72
End: 2022-10-06

## 2022-10-06 NOTE — TELEPHONE ENCOUNTER
Caller: Esha Boogie    Relationship to patient: Self    Best call back number: [phone]    Type of visit: 4 WEEK FOLLOW UP    Requested date: SAME DAY-AFTER 2PM IF POSSIBLE, ANY TIME ON A DIFFERENT DAY     If rescheduling, when is the original appointment: 10.27.22    Additional notes: PATIENT HAS ANOTHER APPOINTMENT ON 10.27 AND SHE WILL NOT GET OUT UNTIL AT LEAST 1:30. [facility]UB TRIED TO RESCHEDULE HER BUT THE FIRST AVAILABLE WITH [facility] OR DR. DALEY WOULD BE 11.15.22 AND PATIENT WOULD LIKE TO BE SEEN CLOSER TO THE 4 WEEK LATOYA BECAUSE OF THE MEDICATION CHANGES THAT HAVE BEEN MADE RECENTLY.

## 2022-10-13 RX ORDER — FLUCONAZOLE 150 MG/1
150 TABLET ORAL ONCE
Qty: 2 TABLET | Refills: 1 | Status: SHIPPED | OUTPATIENT
Start: 2022-10-13 | End: 2022-10-13

## 2022-10-18 ENCOUNTER — TELEPHONE (OUTPATIENT)
Dept: VASCULAR SURGERY | Facility: CLINIC | Age: 72
End: 2022-10-18

## 2022-10-19 ENCOUNTER — OFFICE VISIT (OUTPATIENT)
Dept: VASCULAR SURGERY | Facility: CLINIC | Age: 72
End: 2022-10-19

## 2022-10-19 VITALS
HEART RATE: 85 BPM | DIASTOLIC BLOOD PRESSURE: 76 MMHG | HEIGHT: 60 IN | WEIGHT: 171 LBS | SYSTOLIC BLOOD PRESSURE: 148 MMHG | OXYGEN SATURATION: 96 % | BODY MASS INDEX: 33.57 KG/M2

## 2022-10-19 DIAGNOSIS — I10 ESSENTIAL HYPERTENSION: ICD-10-CM

## 2022-10-19 DIAGNOSIS — I86.8 VARICOSE VEINS OF OTHER SPECIFIED SITES: ICD-10-CM

## 2022-10-19 DIAGNOSIS — Z01.818 PREOP TESTING: ICD-10-CM

## 2022-10-19 DIAGNOSIS — I87.323 CHRONIC VENOUS HYPERTENSION WITH INFLAMMATION INVOLVING BOTH SIDES: Primary | ICD-10-CM

## 2022-10-19 DIAGNOSIS — E78.2 MIXED HYPERLIPIDEMIA: ICD-10-CM

## 2022-10-19 PROCEDURE — 99214 OFFICE O/P EST MOD 30 MIN: CPT | Performed by: NURSE PRACTITIONER

## 2022-10-19 NOTE — PROGRESS NOTES
"10/19/2022       Kandis Snider, CASSIDY  [address] [facility] [address]  [place] [address]    Esha Boogie  1950    Chief Complaint   Patient presents with   • Follow-up     Patient is here for 3 month follow up of chronic venous hypertension with inflammation involving both sides . Pt was advised to wear compression and elevate when possible.  Last seen in the office on 07/15/22.  Pt states that this regimen is working for her.  Denies pain/swelling. Denies stroke symptoms.        Dear CASSIDY Rodriguez:      HPI  I had the pleasure of seeing your patient Esha Boogie in the office today.    As you recall, Esha Boogie is a 72 y.o.  female who you are currently following for hypertension and dyspnea.  She has been having complaints of lower extremity swelling.  She has been wearing compression stockings over the past few months and reports these are helping with some of her symptoms.  She does continue to have some heaviness, tiredness, aching and cramping to her legs.  She did have noninvasive testing showing significant venous insufficiency to bilateral lower extremities.      Review of Systems   Constitutional: Negative.    HENT: Negative.    Eyes: Negative.    Respiratory: Negative.    Cardiovascular: Positive for leg swelling.        Leg cramping   Gastrointestinal: Negative.    Endocrine: Negative.    Genitourinary: Negative.    Musculoskeletal: Negative.    Skin: Negative.    Allergic/Immunologic: Negative.    Neurological: Negative.    Hematological: Negative.    Psychiatric/Behavioral: Negative.         /76   Pulse 85   Ht 152.4 cm (60\")   Wt 77.6 kg (171 lb)   SpO2 96%   BMI 33.40 kg/m²   Physical Exam  Vitals and nursing note reviewed.   Constitutional:       General: She is not in acute distress.     Appearance: Normal appearance. She is well-developed. She is not diaphoretic.   HENT:      Head: Normocephalic and atraumatic.   Eyes:      General: No scleral icterus.   "   Pupils: Pupils are equal, round, and reactive to light.   Neck:      Thyroid: No thyromegaly.      Vascular: No carotid bruit or JVD.   Cardiovascular:      Rate and Rhythm: Normal rate and regular rhythm.      Pulses: Normal pulses.      Heart sounds: Normal heart sounds and S2 normal. No murmur heard.    No friction rub. No gallop.   Pulmonary:      Effort: Pulmonary effort is normal.      Breath sounds: Normal breath sounds.   Abdominal:      General: Bowel sounds are normal.      Palpations: Abdomen is soft.   Musculoskeletal:         General: Swelling present. Normal range of motion.      Cervical back: Normal range of motion and neck supple.   Skin:     General: Skin is warm and dry.   Neurological:      Mental Status: She is alert and oriented to person, place, and time.      Cranial Nerves: No cranial nerve deficit.   Psychiatric:         Mood and Affect: Mood normal.         Behavior: Behavior normal.         Thought Content: Thought content normal.         Judgment: Judgment normal.                US VENOUS DOPPLER LOWER EXTREMITY BILATERAL (DUPLEX)- 7/11/2022 1:26 PM  CDT     HISTORY: bilateral lower extremity edema; R60.0-Localized edema     FINDINGS:  COMPARISON: NONE     FINDINGS:  Transverse and longitudinal grayscale, color Doppler and spectral images  of the bilateral lower extremities were obtained. Venous insufficiency  exam. Additional grayscale, color Doppler, spectral analysis performed  of the bilateral lower extremity deep veins from the distal common  femoral through the proximal anterior posterior tibial and peroneal  veins performed.     There is normal color Doppler flow, compressibility, and augmentation of  the deep veins of the bilateral common femoral, superficial femoral,  deep profundus, popliteal veins. Appropriate color Doppler flow within  the bilateral anterior and posterior tibial as well as peroneal veins.  No evidence of deep vein thrombosis within the bilateral  lower  extremities at this time.      RIGHT LOWER EXTREMITY:     GSV-SFJ: 6.9 mm. 1233 ms reflux  GSV -Mid thigh: 3.8 mm. No reflux.  GSV- Above-knee: 3.0 mm. No reflux.  GSV- midcalf: 2.3 mm.  2117 ms reflux  GSV- ankle: 2.1 mm. No reflux.     No venous thrombus identified in the greater saphenous vein.     Suboptimal visualization of the right lower extremity lesser saphenous  vein.     LEFT LOWER EXTREMITY:     GSV-SFJ: 4.4 mm. No reflux.  GSV -Mid thigh: 3.3 mm. 1728 ms reflux  GSV- Above-knee: 3.3 mm. 2717 ms reflux  GSV- midcalf: 1.7 mm. No reflux.  GSV- ankle: 2.3 mm. 1256 ms reflux     No venous thrombus identified in the greater saphenous vein.     LSV-knee: 3.0 mm.  No reflux.  LSV-mid calf: 2.0 mm. 1561 ms reflux  LSV-ankle: 1.6 mm. No reflux.     No venous thrombus identified in the lesser saphenous vein.     IMPRESSION:  1. No evidence of deep vein thrombosis within the bilateral lower  extremities at this time.  2. There is venous reflux/insufficiency within the bilateral greater  saphenous veins and the left mid lesser saphenous the level of the  midcalf with measurements provided above. Suboptimal visualization the  right lower extremity lesser saphenous vein on today's exam.  This report was finalized on 07/11/2022 17:37 by Dr. Lucia Austin MD.    Patient Active Problem List   Diagnosis   • Essential hypertension   • WILLSON (dyspnea on exertion)   • Mixed hyperlipidemia   • Venous insufficiency   • COVID-19 vaccine series completed   • KATY treated with BiPAP   • Class 2 severe obesity due to excess calories with serious comorbidity and body mass index (BMI) of 35.0 to 35.9 in adult (HCC)   • Chronic diastolic congestive heart failure (HCC)         ICD-10-CM ICD-9-CM   1. Chronic venous hypertension with inflammation involving both sides  I87.323 459.32   2. Essential hypertension  I10 401.9   3. Mixed hyperlipidemia  E78.2 272.2   4. Preop testing  Z01.818 V72.84   5. Varicose veins of other  specified sites  I86.8 456.8           Plan: After thoroughly evaluating Esha Boogie, I believe the best course of action is to proceed with radiofrequency ablation of the right lower extremity, followed by the left shortly thereafter.  Her previous testing did show venous insufficiency to bilateral lower extremities.  Risks of radiofrequency ablation include, but are not limited to, bleeding, infection, vessel damage, nerve damage, DVT, phlebitis, and pulmonary embolus.  The patient understands these risks and wishes to proceed with procedure. I did discuss vascular risk factors as they pertain to the progression of vascular disease including controlling her hypertension and hyperlipidemia.  Her blood pressure stable on her current medications.  She is maintained on Zocor for hyperlipidemia. The patient can continue taking their current medication regimen as previously planned.  This was all discussed in full with complete understanding.    Thank you for allowing me to participate in the care of your patient.  Please do not hesitate with any questions or concerns.  I will keep you aware of any further encounters with Esha Boogie.        Sincerely yours,         CASSIDY Loredo

## 2022-10-25 ENCOUNTER — TELEPHONE (OUTPATIENT)
Dept: VASCULAR SURGERY | Facility: CLINIC | Age: 72
End: 2022-10-25

## 2022-10-25 PROBLEM — I87.323 CHRONIC VENOUS HYPERTENSION WITH INFLAMMATION INVOLVING BOTH SIDES: Status: ACTIVE | Noted: 2022-10-25

## 2022-10-25 PROBLEM — Z01.818 PREOP TESTING: Status: ACTIVE | Noted: 2022-10-25

## 2022-10-25 NOTE — TELEPHONE ENCOUNTER
SPOKE WITH PATIENT REGARDING SURGERY. PT WAS INFORMED OF PRE WORK ON 11/08 AT 1315. PT WAS ALSO INFORMED OF SURGERY ON 11/14 AT 0700. PT WAS INFORMED OF COVID TEST AND VISITATION. PT STATED UNDERSTANDING OF INSTRUCTIONS AND ALL INFORMATION.

## 2022-10-27 ENCOUNTER — OFFICE VISIT (OUTPATIENT)
Dept: CARDIOLOGY | Facility: CLINIC | Age: 72
End: 2022-10-27

## 2022-10-27 VITALS
HEART RATE: 75 BPM | SYSTOLIC BLOOD PRESSURE: 151 MMHG | HEIGHT: 60 IN | BODY MASS INDEX: 33.38 KG/M2 | DIASTOLIC BLOOD PRESSURE: 78 MMHG | WEIGHT: 170 LBS

## 2022-10-27 DIAGNOSIS — E78.2 MIXED HYPERLIPIDEMIA: ICD-10-CM

## 2022-10-27 DIAGNOSIS — E66.09 CLASS 1 OBESITY DUE TO EXCESS CALORIES WITH SERIOUS COMORBIDITY AND BODY MASS INDEX (BMI) OF 33.0 TO 33.9 IN ADULT: ICD-10-CM

## 2022-10-27 DIAGNOSIS — I50.32 CHRONIC DIASTOLIC CONGESTIVE HEART FAILURE: ICD-10-CM

## 2022-10-27 DIAGNOSIS — G47.33 OSA TREATED WITH BIPAP: ICD-10-CM

## 2022-10-27 DIAGNOSIS — I87.2 VENOUS INSUFFICIENCY: ICD-10-CM

## 2022-10-27 DIAGNOSIS — I10 ESSENTIAL HYPERTENSION: Primary | ICD-10-CM

## 2022-10-27 PROCEDURE — 99214 OFFICE O/P EST MOD 30 MIN: CPT | Performed by: NURSE PRACTITIONER

## 2022-10-27 RX ORDER — AMOXICILLIN 500 MG/1
CAPSULE ORAL EVERY 8 HOURS
COMMUNITY
End: 2022-11-08

## 2022-10-27 RX ORDER — SPIRONOLACTONE 25 MG/1
25 TABLET ORAL DAILY
Qty: 30 TABLET | Refills: 11 | Status: SHIPPED | OUTPATIENT
Start: 2022-10-27 | End: 2022-11-08

## 2022-10-27 RX ORDER — FLUCONAZOLE 200 MG/1
TABLET ORAL
COMMUNITY
End: 2022-11-08

## 2022-10-27 NOTE — PROGRESS NOTES
Chief Complaint  Hypertension (1 mo f/u)     Subjective          Esha Boogie presents to [id number] for routine follow-up of medication adjustments.  She was started on Farxiga 10 mg daily at her last office visit on 10/5/2022.  Follow-up BMP on 10/14/2022 revealed normal creatinine and potassium. She has chronic diastolic congestive heart failure, hypertension, hyperlipidemia, obstructive sleep apnea, GERD, venous insufficiency and obesity. She complains of chronic bilateral lower extremity edema, however she states this has improved since starting Farxiga. She reports occasional palpitations. Patient denies chest pain, shortness of breath, dizziness, syncope, orthopnea, PND or decreased stamina.  Patient denies any signs of bleeding.    Hypertension  This is a chronic problem. The current episode started more than 1 year ago. The problem is uncontrolled. Associated symptoms include palpitations and peripheral edema. Pertinent negatives include no anxiety, blurred vision, chest pain, headaches, malaise/fatigue, neck pain, orthopnea, PND, shortness of breath or sweats. Risk factors for coronary artery disease include obesity, dyslipidemia and post-menopausal state. Current antihypertension treatment includes central alpha agonists, ACE inhibitors, beta blockers and diuretics. The current treatment provides significant improvement. Hypertensive end-organ damage includes heart failure. Identifiable causes of hypertension include sleep apnea.   Hyperlipidemia  This is a chronic problem. The current episode started more than 1 year ago. The problem is controlled. Exacerbating diseases include obesity. Pertinent negatives include no chest pain or shortness of breath. Current antihyperlipidemic treatment includes statins. Risk factors for coronary artery disease include hypertension, obesity, post-menopausal and dyslipidemia.   Congestive Heart Failure  Presents for follow-up  visit. Associated symptoms include edema and palpitations. Pertinent negatives include no abdominal pain, chest pain, chest pressure, claudication, fatigue, muscle weakness, near-syncope, nocturia, orthopnea, paroxysmal nocturnal dyspnea, shortness of breath or unexpected weight change. The symptoms have been stable. Compliance with total regimen is 51-75%. Compliance with diet is 51-75%. Compliance with exercise is 51-75%. Compliance with medications is %.     I have reviewed and confirmed the accuracy of the ROS  CASSIDY Rodriguez        Objective     Current Outpatient Medications:   •  amLODIPine (NORVASC) 2.5 MG tablet, Take 1 tablet by mouth Daily., Disp: 30 tablet, Rfl: 11  •  amoxicillin (AMOXIL) 500 MG capsule, Every 8 (Eight) Hours., Disp: , Rfl:   •  cloNIDine (Catapres) 0.1 MG tablet, Take 1 tablet by mouth 3 (Three) Times a Day., Disp: 270 tablet, Rfl: 3  •  dapagliflozin Propanediol 10 MG tablet, Take 10 mg by mouth Daily., Disp: 30 tablet, Rfl: 11  •  fluconazole (DIFLUCAN) 200 MG tablet, Diflucan 200 mg tablet  Take 1 tablet every day by oral route., Disp: , Rfl:   •  furosemide (LASIX) 40 MG tablet, Take 1 tablet by mouth Daily As Needed (daily as needed for increased shortness of breath, swelling and/or weight gain)., Disp: 90 tablet, Rfl: 3  •  hydroCHLOROthiazide (HYDRODIURIL) 50 MG tablet, Take 1 tablet by mouth Daily., Disp: 90 tablet, Rfl: 3  •  lisinopril (PRINIVIL,ZESTRIL) 40 MG tablet, Take 1 tablet by mouth Daily., Disp: 90 tablet, Rfl: 3  •  metoprolol succinate XL (TOPROL-XL) 100 MG 24 hr tablet, Take 100 mg by mouth Daily., Disp: , Rfl:   •  multivitamin with minerals tablet tablet, Take 1 tablet by mouth Daily. Multi-vitamin along with a Preservision, Disp: , Rfl:   •  omeprazole (priLOSEC) 20 MG capsule, Take 20 mg by mouth Daily., Disp: , Rfl:   •  simvastatin (ZOCOR) 20 MG tablet, Take 20 mg by mouth Every Night., Disp: , Rfl:   •  vitamin C (ASCORBIC ACID) 250 MG  "tablet, Take 1,000 mg by mouth Daily., Disp: , Rfl:   •  spironolactone (ALDACTONE) 25 MG tablet, Take 1 tablet by mouth Daily., Disp: 30 tablet, Rfl: 11  Vital Signs:   /78   Pulse 75   Ht 152.4 cm (60\")   Wt 77.1 kg (170 lb)   BMI 33.20 kg/m²     Vitals and nursing note reviewed.   Constitutional:       General: Not in acute distress.     Appearance: Normal and healthy appearance. Well-developed and not in distress. Obese. Not diaphoretic.   Eyes:      General: Lids are normal.         Right eye: No discharge.         Left eye: No discharge.      Conjunctiva/sclera: Conjunctivae normal.      Pupils: Pupils are equal, round, and reactive to light.   HENT:      Head: Normocephalic and atraumatic.      Jaw: There is normal jaw occlusion.      Right Ear: External ear normal.      Left Ear: External ear normal.      Nose: Nose normal.   Neck:      Thyroid: No thyromegaly.      Vascular: No carotid bruit, JVD or JVR. JVD normal.      Trachea: Trachea normal. No tracheal deviation.   Pulmonary:      Effort: Pulmonary effort is normal. No respiratory distress.      Breath sounds: Normal breath sounds. No decreased breath sounds. No wheezing. No rhonchi. No rales.   Chest:      Chest wall: Not tender to palpatation.   Cardiovascular:      PMI at left midclavicular line. Normal rate. Regular rhythm. Normal S1. Normal S2.      Murmurs: There is no murmur.      No gallop. No click. No rub.   Pulses:     Intact distal pulses. No decreased pulses.   Edema:     Peripheral edema present.     Pretibial: bilateral trace edema of the pretibial area.     Ankle: bilateral trace edema of the ankle.     Feet: bilateral trace edema of the feet.  Abdominal:      General: Bowel sounds are normal. There is no distension.      Palpations: Abdomen is soft.      Tenderness: There is no abdominal tenderness.   Musculoskeletal: Normal range of motion.         General: No tenderness or deformity.      Cervical back: Normal range of " motion and neck supple. Skin:     General: Skin is warm and dry.      Coloration: Skin is not pale.      Findings: No erythema or rash.   Neurological:      General: No focal deficit present.      Mental Status: Alert, oriented to person, place, and time and oriented to person, place and time.   Psychiatric:         Attention and Perception: Attention and perception normal.         Mood and Affect: Mood and affect normal.         Speech: Speech normal.         Behavior: Behavior normal.         Thought Content: Thought content normal.         Cognition and Memory: Cognition and memory normal.         Judgment: Judgment normal.        Result Review :   The following data was reviewed by: CASSIDY Rodriguez on 10/27/2022:    Radiology exam- bilateral lower extremity venous ultrasound 7/11/22 and cardiology exam- 2d echo 9/13/22         Assessment and Plan    Diagnoses and all orders for this visit:    1. Essential hypertension (Primary)-blood pressure remains elevated. Continue to monitor following medication adjustments below. Continue HCTZ, lisinopril, amlodipine, clonidine and metoprolol succinate.  Monitor and record daily blood pressure. Report readings consistently higher than 130/90 or consistently lower than 100/60.     2. Mixed hyperlipidemia-management per PCP.  Continue simvastatin.    3. KATY treated with BiPAP-patient reports compliance with BiPAP.  Stable.    4. Class 1 obesity due to excess calories with serious comorbidity and body mass index (BMI) of 33.0 to 33.9 in adult- BMI is >= 30 and <35. (Class 1 Obesity). The following options were offered after discussion;: weight loss educational material (shared in after visit summary).     5.  Venous insufficiency- present on bilateral lower extremity venous ultrasound 7/11/2022.  Pt is following with Dr. Serafin Sher with vascular surgery. Pt is tentatively scheduled for right lower extremity venous ablation on 11/14/22. With left lower extremity  planned to follow soon after.     6. Chronic diastolic congestive heart failure (HCC)- NYHA class II. Compensated.  Patient was unable to tolerate Entresto due to reported skin irritation and pruritus.  Start spironolactone 25 mg daily. BMP in one week. Reviewed signs and symptoms of CHF and what to report with the patient. Patient instructed to restrict sodium and weigh daily. Report weight gain of greater than 2 lbs overnight or 5 lbs in 1 week. Pt verbalized understanding of instructions and plan of care.  Continue Farxiga, lisinopril and metoprolol succinate.       Follow Up   Return in about 4 weeks (around 11/24/2022) for Next scheduled follow up.  Patient was given instructions and counseling regarding her condition or for health maintenance advice. Please see specific information pulled into the AVS if appropriate.

## 2022-11-08 ENCOUNTER — PRE-ADMISSION TESTING (OUTPATIENT)
Dept: PREADMISSION TESTING | Facility: HOSPITAL | Age: 72
End: 2022-11-08

## 2022-11-08 VITALS
SYSTOLIC BLOOD PRESSURE: 110 MMHG | RESPIRATION RATE: 18 BRPM | OXYGEN SATURATION: 99 % | HEART RATE: 78 BPM | DIASTOLIC BLOOD PRESSURE: 43 MMHG | BODY MASS INDEX: 35.2 KG/M2 | WEIGHT: 174.6 LBS | HEIGHT: 59 IN

## 2022-11-08 DIAGNOSIS — Z01.818 PREOP TESTING: ICD-10-CM

## 2022-11-08 DIAGNOSIS — I87.323 CHRONIC VENOUS HYPERTENSION WITH INFLAMMATION INVOLVING BOTH SIDES: ICD-10-CM

## 2022-11-08 LAB
ANION GAP SERPL CALCULATED.3IONS-SCNC: 8 MMOL/L (ref 5–15)
APTT PPP: 31.4 SECONDS (ref 24.1–35)
BASOPHILS # BLD AUTO: 0.04 10*3/MM3 (ref 0–0.2)
BASOPHILS NFR BLD AUTO: 0.4 % (ref 0–1.5)
BUN SERPL-MCNC: 24 MG/DL (ref 8–23)
BUN/CREAT SERPL: 31.2 (ref 7–25)
CALCIUM SPEC-SCNC: 9.5 MG/DL (ref 8.6–10.5)
CHLORIDE SERPL-SCNC: 103 MMOL/L (ref 98–107)
CO2 SERPL-SCNC: 26 MMOL/L (ref 22–29)
CREAT SERPL-MCNC: 0.77 MG/DL (ref 0.57–1)
DEPRECATED RDW RBC AUTO: 44.1 FL (ref 37–54)
EGFRCR SERPLBLD CKD-EPI 2021: 81.6 ML/MIN/1.73
EOSINOPHIL # BLD AUTO: 0.06 10*3/MM3 (ref 0–0.4)
EOSINOPHIL NFR BLD AUTO: 0.6 % (ref 0.3–6.2)
ERYTHROCYTE [DISTWIDTH] IN BLOOD BY AUTOMATED COUNT: 13.4 % (ref 12.3–15.4)
GLUCOSE SERPL-MCNC: 161 MG/DL (ref 65–99)
HCT VFR BLD AUTO: 41 % (ref 34–46.6)
HGB BLD-MCNC: 13.5 G/DL (ref 12–15.9)
IMM GRANULOCYTES # BLD AUTO: 0.03 10*3/MM3 (ref 0–0.05)
IMM GRANULOCYTES NFR BLD AUTO: 0.3 % (ref 0–0.5)
INR PPP: 1.07 (ref 0.91–1.09)
LYMPHOCYTES # BLD AUTO: 2.16 10*3/MM3 (ref 0.7–3.1)
LYMPHOCYTES NFR BLD AUTO: 20.4 % (ref 19.6–45.3)
MCH RBC QN AUTO: 29.5 PG (ref 26.6–33)
MCHC RBC AUTO-ENTMCNC: 32.9 G/DL (ref 31.5–35.7)
MCV RBC AUTO: 89.7 FL (ref 79–97)
MONOCYTES # BLD AUTO: 0.64 10*3/MM3 (ref 0.1–0.9)
MONOCYTES NFR BLD AUTO: 6 % (ref 5–12)
NEUTROPHILS NFR BLD AUTO: 7.65 10*3/MM3 (ref 1.7–7)
NEUTROPHILS NFR BLD AUTO: 72.3 % (ref 42.7–76)
NRBC BLD AUTO-RTO: 0 /100 WBC (ref 0–0.2)
PLATELET # BLD AUTO: 297 10*3/MM3 (ref 140–450)
PMV BLD AUTO: 10.5 FL (ref 6–12)
POTASSIUM SERPL-SCNC: 4.2 MMOL/L (ref 3.5–5.2)
PROTHROMBIN TIME: 13.4 SECONDS (ref 11.9–14.6)
RBC # BLD AUTO: 4.57 10*6/MM3 (ref 3.77–5.28)
SODIUM SERPL-SCNC: 137 MMOL/L (ref 136–145)
WBC NRBC COR # BLD: 10.58 10*3/MM3 (ref 3.4–10.8)

## 2022-11-08 PROCEDURE — 36415 COLL VENOUS BLD VENIPUNCTURE: CPT

## 2022-11-08 PROCEDURE — 85610 PROTHROMBIN TIME: CPT

## 2022-11-08 PROCEDURE — 85730 THROMBOPLASTIN TIME PARTIAL: CPT

## 2022-11-08 PROCEDURE — 85025 COMPLETE CBC W/AUTO DIFF WBC: CPT

## 2022-11-08 PROCEDURE — 80048 BASIC METABOLIC PNL TOTAL CA: CPT

## 2022-11-08 RX ORDER — MULTIPLE VITAMINS W/ MINERALS TAB 9MG-400MCG
1 TAB ORAL DAILY
COMMUNITY
End: 2023-01-09

## 2022-11-08 NOTE — DISCHARGE INSTRUCTIONS
Before you come to the hospital        Arrival time: AS DIRECTED BY OFFICE     YOU MAY TAKE THE FOLLOWING MEDICATION(S) THE MORNING OF SURGERY WITH A SIP OF WATER: METOPROLOL    DO NOT TAKE LISINOPRIL FOR 24 HOURS PRIOR TO SURGERY           ALL OTHER HOME MEDICATION CHECK WITH YOUR PHYSICIAN (especially if you are taking diabetes medicines or blood thinners)      If you were given and instructed to use a germ- killing soap, use as directed the night before surgery and again the morning of surgery or as directed by your surgeon.    (See attached information for How to Use Chlorhexidine for Bathing if applicable.)            Eating and drinking restrictions prior to scheduled arrival time    2 Hours before arrival time STOP   Drinking Clear liquids (water, apple juice-no pulp)     6 Hours before arrival time STOP   Milk or drinks that contain milk, full liquids    6 Hours before arrival time STOP   Light meals or foods, such as toast or cereal    8 Hours before arrival time STOP   Heavy foods, such as meat, fried foods, or fatty foods    (It is extremely important that you follow these guidelines to prevent delay or cancelation of your procedure)     Clear Liquids  Water and flavored water                                                                      Clear Fruit juices, such as cranberry juice and apple juice.  Black coffee (NO cream of any kind, including powdered).  Plain tea  Clear bouillon or broth.  Flavored gelatin.  Soda.  Gatorade or Powerade.  Full liquid examples  Juices that have pulp.  Frozen ice pops that contain fruit pieces.  Coffee with creamer  Milk.  Yogurt.                MANAGING PAIN AFTER SURGERY    We know you are probably wondering what your pain will be like after surgery.  Following surgery it is unrealistic to expect you will not have pain.   Pain is how our bodies let us know that something is wrong or cautions us to be careful.  That said, our goal is to make your pain  tolerable.    Methods we may use to treat your pain include (oral or IV medications, PCAs, epidurals, nerve blocks, etc.)   While some procedures require IV pain medications for a short time after surgery, transitioning to pain medications by mouth allows for better management of pain.   Your nurse will encourage you to take oral pain medications whenever possible.  IV medications work almost immediately, but only last a short while.  Taking medications by mouth allows for a more constant level of medication in your blood stream for a longer period of time.      Once your pain is out of control it is harder to get back under control.  It is important you are aware when your next dose of pain medication is due.  If you are admitted, your nurse may write the time of your next dose on the white board in your room to help you remember.      We are interested in your pain and encourage you to inform us about aggravating factors during your visit.   Many times a simple repositioning every few hours can make a big difference.    If your physician says it is okay, do not let your pain prevent you from getting out of bed. Be sure to call your nurse for assistance prior to getting up so you do not fall.      Before surgery, please decide your tolerable pain goal.  These faces help describe the pain ratings we use on a 0-10 scale.   Be prepared to tell us your goal and whether or not you take pain or anxiety medications at home.          Preparing for Surgery  Preparing for surgery is an important part of your care. It can make things go more smoothly and help you avoid complications. The steps leading up to surgery may vary among hospitals. Follow all instructions given to you by your health care providers. Ask questions if you do not understand something. Talk about any concerns that you have.  Here are some questions to consider asking before your surgery:  If my surgery is not an emergency (is elective), when would be the  best time to have the surgery?  What arrangements do I need to make for work, home, or school?  What will my recovery be like? How long will it be before I can return to normal activities?  Will I need to prepare my home? Will I need to arrange care for me or my children?  Should I expect to have pain after surgery? What are my pain management options? Are there nonmedical options that I can try for pain?  Tell a health care provider about:  Any allergies you have.  All medicines you are taking, including vitamins, herbs, eye drops, creams, and over-the-counter medicines.  Any problems you or family members have had with anesthetic medicines.  Any blood disorders you have.  Any surgeries you have had.  Any medical conditions you have.  Whether you are pregnant or may be pregnant.  What are the risks?  The risks and complications of surgery depend on the specific procedure that you have. Discuss all the risks with your health care providers before your surgery. Ask about common surgical complications, which may include:  Infection.  Bleeding or a need for blood replacement (transfusion).  Allergic reactions to medicines.  Damage to surrounding nerves, tissues, or structures.  A blood clot.  Scarring.  Failure of the surgery to correct the problem.  Follow these instructions before the procedure:  Several days or weeks before your procedure  You may have a physical exam by your primary health care provider to make sure it is safe for you to have surgery.  You may have testing. This may include a chest X-ray, blood and urine tests, electrocardiogram (ECG), or other testing.  Ask your health care provider about:  Changing or stopping your regular medicines. This is especially important if you are taking diabetes medicines or blood thinners.  Taking medicines such as aspirin and ibuprofen. These medicines can thin your blood. Do not take these medicines unless your health care provider tells you to take them.  Taking  over-the-counter medicines, vitamins, herbs, and supplements.  Do not use any products that contain nicotine or tobacco, such as cigarettes and e-cigarettes. If you need help quitting, ask your health care provider.  Avoid alcohol.  Ask your health care provider if there are exercises you can do to prepare for surgery.  Eat a healthy diet.   Plan to have someone take you home from the hospital or clinic.  Plan to have a responsible adult care for you for at least 24 hours after you leave the hospital or clinic. This is important.  The day before your procedure  You may be given antibiotic medicine to take by mouth to help prevent infection. Take it as told by your health care provider.  You may be asked to shower with a germ-killing soap.  Follow instructions from your health care provider about eating and drinking restrictions. This includes gum, mints and hard candy.  Pack comfortable clothes according to your procedure.   The day of your procedure  You may need to take another shower with a germ-killing soap before you leave home in the morning.  With a small sip of water, take only the medicines that you are told to take.  Remove all jewelry including rings.   Leave anything you consider valuable at home except hearing aids if needed.  Do not wear any makeup, nail polish, powder, deodorant, lotion, hair accessories, or anything on your skin or body except your clothes.  If you will be staying in the hospital, bring a case to hold your glasses, contacts, or dentures. You may also want to bring your robe and non-skid footwear.  If you wear oxygen at home, bring it with you the day of surgery.  If instructed by your health care provider, bring your sleep apnea device with you on the day of your surgery (if this applies to you).  You may want to leave your suitcase and sleep apnea device in the car until after surgery.   Arrive at the hospital as scheduled.  Bring a friend or family member with you who can help to  answer questions and be present while you meet with your health care provider.  At the hospital  When you arrive at the hospital:  Go to registration located at the main entrance of the hospital. You will be registered and given a beeper and a sticker sheet. Take the stickers to the Outpatient nurses desk and place in the black tray. This is to notify staff that you have arrived. Then return to the lobby to wait.   When your beeper lights up and vibrates proceed through the double doors, under the stairs, and a member of the Outpatient Surgery staff will escort you to your preoperative room.  You may have to wear compression sleeves. These help to prevent blood clots and reduce swelling in your legs.  An IV may be inserted into one of your veins.              In the operating room, you may be given one or more of the following:        A medicine to help you relax (sedative).        A medicine to numb the area (local anesthetic).        A medicine to make you fall asleep (general anesthetic).        A medicine that is injected into an area of your body to numb everything below the                      injection site (regional anesthetic).  You may be given an antibiotic through your IV to help prevent infection.  Your surgical site will be marked or identified.    Contact a health care provider if you:  Develop a fever of more than 100.4°F (38°C) or other feelings of illness during the 48 hours before your surgery.  Have symptoms that get worse.  Have questions or concerns about your surgery.  Summary  Preparing for surgery can make the procedure go more smoothly and lower your risk of complications.  Before surgery, make a list of questions and concerns to discuss with your surgeon. Ask about the risks and possible complications.  In the days or weeks before your surgery, follow all instructions from your health care provider. You may need to stop smoking, avoid alcohol, follow eating restrictions, and change or  stop your regular medicines.  Contact your surgeon if you develop a fever or other signs of illness during the few days before your surgery.  This information is not intended to replace advice given to you by your health care provider. Make sure you discuss any questions you have with your health care provider.  Document Revised: 12/21/2018 Document Reviewed: 10/23/2018  [employer] Patient Education © 2021 [employer]

## 2022-11-11 ENCOUNTER — TELEPHONE (OUTPATIENT)
Dept: VASCULAR SURGERY | Facility: CLINIC | Age: 72
End: 2022-11-11

## 2022-11-14 ENCOUNTER — HOSPITAL ENCOUNTER (OUTPATIENT)
Facility: HOSPITAL | Age: 72
Setting detail: HOSPITAL OUTPATIENT SURGERY
Discharge: HOME | End: 2022-11-14
Attending: SURGERY | Admitting: SURGERY

## 2022-11-14 ENCOUNTER — ANESTHESIA (OUTPATIENT)
Dept: PERIOP | Facility: HOSPITAL | Age: 72
End: 2022-11-14

## 2022-11-14 ENCOUNTER — APPOINTMENT (OUTPATIENT)
Dept: ULTRASOUND IMAGING | Facility: HOSPITAL | Age: 72
End: 2022-11-14

## 2022-11-14 ENCOUNTER — ANESTHESIA EVENT (OUTPATIENT)
Dept: PERIOP | Facility: HOSPITAL | Age: 72
End: 2022-11-14

## 2022-11-14 VITALS
SYSTOLIC BLOOD PRESSURE: 123 MMHG | HEART RATE: 67 BPM | TEMPERATURE: 97.4 F | DIASTOLIC BLOOD PRESSURE: 107 MMHG | RESPIRATION RATE: 18 BRPM | OXYGEN SATURATION: 98 %

## 2022-11-14 DIAGNOSIS — Z01.818 PREOP TESTING: ICD-10-CM

## 2022-11-14 DIAGNOSIS — I87.323 CHRONIC VENOUS HYPERTENSION WITH INFLAMMATION INVOLVING BOTH SIDES: ICD-10-CM

## 2022-11-14 PROCEDURE — 36475 ENDOVENOUS RF 1ST VEIN: CPT | Performed by: SURGERY

## 2022-11-14 PROCEDURE — 25010000002 FENTANYL CITRATE (PF) 100 MCG/2ML SOLUTION: Performed by: NURSE ANESTHETIST, CERTIFIED REGISTERED

## 2022-11-14 PROCEDURE — 76937 US GUIDE VASCULAR ACCESS: CPT

## 2022-11-14 PROCEDURE — C1894 INTRO/SHEATH, NON-LASER: HCPCS | Performed by: SURGERY

## 2022-11-14 PROCEDURE — 25010000002 CEFAZOLIN PER 500 MG: Performed by: NURSE PRACTITIONER

## 2022-11-14 PROCEDURE — C1888 ENDOVAS NON-CARDIAC ABL CATH: HCPCS | Performed by: SURGERY

## 2022-11-14 PROCEDURE — 25010000002 PROPOFOL 1000 MG/100ML EMULSION: Performed by: NURSE ANESTHETIST, CERTIFIED REGISTERED

## 2022-11-14 RX ORDER — DROPERIDOL 2.5 MG/ML
0.62 INJECTION, SOLUTION INTRAMUSCULAR; INTRAVENOUS ONCE AS NEEDED
Status: DISCONTINUED | OUTPATIENT
Start: 2022-11-14 | End: 2022-11-14 | Stop reason: HOSPADM

## 2022-11-14 RX ORDER — IBUPROFEN 600 MG/1
600 TABLET ORAL ONCE AS NEEDED
Status: DISCONTINUED | OUTPATIENT
Start: 2022-11-14 | End: 2022-11-14 | Stop reason: HOSPADM

## 2022-11-14 RX ORDER — SODIUM CHLORIDE 0.9 % (FLUSH) 0.9 %
3-10 SYRINGE (ML) INJECTION AS NEEDED
Status: DISCONTINUED | OUTPATIENT
Start: 2022-11-14 | End: 2022-11-14 | Stop reason: HOSPADM

## 2022-11-14 RX ORDER — SODIUM CHLORIDE 0.9 % (FLUSH) 0.9 %
3 SYRINGE (ML) INJECTION AS NEEDED
Status: DISCONTINUED | OUTPATIENT
Start: 2022-11-14 | End: 2022-11-14 | Stop reason: HOSPADM

## 2022-11-14 RX ORDER — OXYCODONE AND ACETAMINOPHEN 7.5; 325 MG/1; MG/1
2 TABLET ORAL EVERY 4 HOURS PRN
Status: DISCONTINUED | OUTPATIENT
Start: 2022-11-14 | End: 2022-11-14 | Stop reason: HOSPADM

## 2022-11-14 RX ORDER — ONDANSETRON 2 MG/ML
4 INJECTION INTRAMUSCULAR; INTRAVENOUS ONCE AS NEEDED
Status: DISCONTINUED | OUTPATIENT
Start: 2022-11-14 | End: 2022-11-14 | Stop reason: HOSPADM

## 2022-11-14 RX ORDER — FENTANYL CITRATE 50 UG/ML
INJECTION, SOLUTION INTRAMUSCULAR; INTRAVENOUS AS NEEDED
Status: DISCONTINUED | OUTPATIENT
Start: 2022-11-14 | End: 2022-11-14 | Stop reason: SURG

## 2022-11-14 RX ORDER — SODIUM CHLORIDE, SODIUM LACTATE, POTASSIUM CHLORIDE, CALCIUM CHLORIDE 600; 310; 30; 20 MG/100ML; MG/100ML; MG/100ML; MG/100ML
100 INJECTION, SOLUTION INTRAVENOUS CONTINUOUS
Status: DISCONTINUED | OUTPATIENT
Start: 2022-11-14 | End: 2022-11-14 | Stop reason: HOSPADM

## 2022-11-14 RX ORDER — SODIUM CHLORIDE, SODIUM LACTATE, POTASSIUM CHLORIDE, CALCIUM CHLORIDE 600; 310; 30; 20 MG/100ML; MG/100ML; MG/100ML; MG/100ML
1000 INJECTION, SOLUTION INTRAVENOUS CONTINUOUS
Status: DISCONTINUED | OUTPATIENT
Start: 2022-11-14 | End: 2022-11-14 | Stop reason: HOSPADM

## 2022-11-14 RX ORDER — ACETAMINOPHEN 500 MG
1000 TABLET ORAL ONCE
Status: DISCONTINUED | OUTPATIENT
Start: 2022-11-14 | End: 2022-11-14 | Stop reason: HOSPADM

## 2022-11-14 RX ORDER — PROPOFOL 10 MG/ML
INJECTION, EMULSION INTRAVENOUS CONTINUOUS PRN
Status: DISCONTINUED | OUTPATIENT
Start: 2022-11-14 | End: 2022-11-14 | Stop reason: SURG

## 2022-11-14 RX ORDER — LIDOCAINE HYDROCHLORIDE 20 MG/ML
INJECTION, SOLUTION EPIDURAL; INFILTRATION; INTRACAUDAL; PERINEURAL AS NEEDED
Status: DISCONTINUED | OUTPATIENT
Start: 2022-11-14 | End: 2022-11-14 | Stop reason: SURG

## 2022-11-14 RX ORDER — LIDOCAINE HYDROCHLORIDE 10 MG/ML
0.5 INJECTION, SOLUTION EPIDURAL; INFILTRATION; INTRACAUDAL; PERINEURAL ONCE AS NEEDED
Status: DISCONTINUED | OUTPATIENT
Start: 2022-11-14 | End: 2022-11-14 | Stop reason: HOSPADM

## 2022-11-14 RX ORDER — HYDROCODONE BITARTRATE AND ACETAMINOPHEN 5; 325 MG/1; MG/1
1 TABLET ORAL EVERY 6 HOURS PRN
Qty: 10 TABLET | Refills: 0 | Status: SHIPPED | OUTPATIENT
Start: 2022-11-14 | End: 2023-01-09

## 2022-11-14 RX ORDER — SODIUM CHLORIDE 0.9 % (FLUSH) 0.9 %
3 SYRINGE (ML) INJECTION EVERY 12 HOURS SCHEDULED
Status: DISCONTINUED | OUTPATIENT
Start: 2022-11-14 | End: 2022-11-14 | Stop reason: HOSPADM

## 2022-11-14 RX ORDER — NALOXONE HCL 0.4 MG/ML
0.4 VIAL (ML) INJECTION AS NEEDED
Status: DISCONTINUED | OUTPATIENT
Start: 2022-11-14 | End: 2022-11-14 | Stop reason: HOSPADM

## 2022-11-14 RX ORDER — SODIUM CHLORIDE 9 MG/ML
INJECTION, SOLUTION INTRAVENOUS AS NEEDED
Status: DISCONTINUED | OUTPATIENT
Start: 2022-11-14 | End: 2022-11-14 | Stop reason: HOSPADM

## 2022-11-14 RX ORDER — FLUMAZENIL 0.1 MG/ML
0.2 INJECTION INTRAVENOUS AS NEEDED
Status: DISCONTINUED | OUTPATIENT
Start: 2022-11-14 | End: 2022-11-14 | Stop reason: HOSPADM

## 2022-11-14 RX ORDER — OXYCODONE AND ACETAMINOPHEN 10; 325 MG/1; MG/1
1 TABLET ORAL ONCE AS NEEDED
Status: DISCONTINUED | OUTPATIENT
Start: 2022-11-14 | End: 2022-11-14 | Stop reason: HOSPADM

## 2022-11-14 RX ORDER — HYDROCODONE BITARTRATE AND ACETAMINOPHEN 5; 325 MG/1; MG/1
1 TABLET ORAL ONCE AS NEEDED
Status: DISCONTINUED | OUTPATIENT
Start: 2022-11-14 | End: 2022-11-14 | Stop reason: HOSPADM

## 2022-11-14 RX ORDER — FENTANYL CITRATE 50 UG/ML
25 INJECTION, SOLUTION INTRAMUSCULAR; INTRAVENOUS
Status: DISCONTINUED | OUTPATIENT
Start: 2022-11-14 | End: 2022-11-14 | Stop reason: HOSPADM

## 2022-11-14 RX ORDER — LABETALOL HYDROCHLORIDE 5 MG/ML
5 INJECTION, SOLUTION INTRAVENOUS
Status: DISCONTINUED | OUTPATIENT
Start: 2022-11-14 | End: 2022-11-14 | Stop reason: HOSPADM

## 2022-11-14 RX ADMIN — LIDOCAINE HYDROCHLORIDE 100 MG: 20 INJECTION, SOLUTION EPIDURAL; INFILTRATION; INTRACAUDAL; PERINEURAL at 10:18

## 2022-11-14 RX ADMIN — FENTANYL CITRATE 50 MCG: 50 INJECTION INTRAMUSCULAR; INTRAVENOUS at 10:18

## 2022-11-14 RX ADMIN — SODIUM CHLORIDE, POTASSIUM CHLORIDE, SODIUM LACTATE AND CALCIUM CHLORIDE 1000 ML: 600; 310; 30; 20 INJECTION, SOLUTION INTRAVENOUS at 08:02

## 2022-11-14 RX ADMIN — PROPOFOL 100 MCG/KG/MIN: 10 INJECTION, EMULSION INTRAVENOUS at 10:18

## 2022-11-14 RX ADMIN — FENTANYL CITRATE 50 MCG: 50 INJECTION INTRAMUSCULAR; INTRAVENOUS at 10:27

## 2022-11-14 NOTE — OP NOTE
Esha Boogie  11/14/2022     PREOPERATIVE DIAGNOSIS: Chronic venous hypertension with inflammation involving both sides [I87.323]  Preop testing [Z01.818]     POSTOPERATIVE DIAGNOSIS: Post-Op Diagnosis Codes:     * Chronic venous hypertension with inflammation involving both sides [I87.323]     * Preop testing [Z01.818]     PROCEDURE PERFORMED:   1.  Ultrasound-guided cannulation of the right lower extremity greater saphenous vein  2.  Radiofrequency ablation of the right lower extremity greater saphenous vein     SURGEON: Serafin Sher DO      ANESTHESIA: MAC    PREPARATION: Routine.    STAFF: Circulator: Juan Antonio Vega RN  Scrub Person: Devin Norwood; Bree Clarke; Naina Brooke    Estimated Blood Loss: minimal    SPECIMENS: None    COMPLICATIONS: None    INDICATIONS: Esha Boogie is a 72 y.o. female who has been having complaints of lower extremity swelling.  She has been wearing compression stockings over the past few months and reports these are helping with some of her symptoms.  She does continue to have some heaviness, tiredness, aching and cramping to her legs.  She did have noninvasive testing showing significant venous insufficiency to bilateral lower extremities.  The indications, risks, and possible complications of the procedure were explained to the patient, who voiced understanding and wished to proceed with surgery.     PROCEDURE IN DETAIL:   The patient was taken to the operating room and placed on the operating table in a supine position. After MAC anesthesia was obtained, the right lower extremity was prepped and draped in a sterile manner.  Under ultrasound guidance and using a micropuncture technique the right lower extremity greater saphenous vein was cannulated and a microwire was placed.  This was confirmed under ultrasound.  A small stab incision was made with 11 blade and a 7 Persian sheath was placed.  The patient was placed in Trendelenburg position and the  saphenofemoral junction was identified under ultrasound.  The catheter was placed up to the junction and pulled back 3 cm and marked.  Next, tumescent fluid was instilled along the entire length of the vein under ultrasound guidance.  Once sufficient tumescent fluid was placed radiofrequency ablation had commenced.  There was a total of 7 RF cycles for a total treatment length of 39.5 cm for a total treatment time of 2 minutes and 20 seconds.  There was an average of 10 W at an average temperature of 120°C.  Upon completion of the ablation the catheter and sheath were removed.  Direct pressure was held for an additional 5-10 minutes to ensure hemostasis.  An Ace wrap was placed from the toes to the groin.  Sterile dressings were applied. The patient tolerated the procedure well. Sponge and needle counts were correct. The patient was then awakened and transferred to the outpatient center in stable condition.  Serafin Sher DO  Date: 11/14/2022 Time: 10:45 CST     CC: CASSIDY Rodriguez

## 2022-11-14 NOTE — ANESTHESIA POSTPROCEDURE EVALUATION
Patient: Esha Boogie    Procedure Summary     Date: 11/14/22 Room / Location: [facility] OR  /  PAD HYBRID OR 12    Anesthesia Start: 1015 Anesthesia Stop: 1105    Procedure: RIGHT SAPHENOUS VEIN RADIO FREQUENCY ABLATION (Right: Leg Lower) Diagnosis:       Chronic venous hypertension with inflammation involving both sides      Preop testing      (Chronic venous hypertension with inflammation involving both sides [I87.323])      (Preop testing [Z01.818])    Surgeons: Serafin Sher DO Provider: Darinel Ruelas CRNA    Anesthesia Type: MAC ASA Status: 3          Anesthesia Type: MAC    Vitals  Vitals Value Taken Time   /58 11/14/22 1116   Temp     Pulse 66 11/14/22 1120   Resp 18 11/14/22 1057   SpO2 95 % 11/14/22 1120   Vitals shown include unvalidated device data.        Anesthesia Post Evaluation

## 2022-11-14 NOTE — ANESTHESIA PREPROCEDURE EVALUATION
Anesthesia Evaluation     Patient summary reviewed   history of anesthetic complications: PONV  NPO Solid Status: > 6 hours             Airway   Mallampati: II  Dental      Pulmonary    (+) sleep apnea (Bipap ) on CPAP,   (-) not a smoker  Cardiovascular   Exercise tolerance: good (4-7 METS)    (+) hypertension, CHF Diastolic >=55%, PVD, hyperlipidemia,   (-) pacemaker, valvular problems/murmurs, past MI, CABG    ROS comment: 7/2022: Left ventricular ejection fraction appears to be 61 - 65%. Left ventricular systolic function is normal.  Left ventricular diastolic function is consistent with (grade II w/high LAP) pseudonormalization.  Mild pulmonary hypertension is present.      Neuro/Psych  (-) seizures, CVA  GI/Hepatic/Renal/Endo    (-) no renal disease, diabetes    Musculoskeletal     Abdominal   (+) obese,    Substance History      OB/GYN          Other                        Anesthesia Plan    ASA 3     MAC     intravenous induction     Anesthetic plan, risks, benefits, and alternatives have been provided, discussed and informed consent has been obtained with: patient.        CODE STATUS:

## 2022-11-14 NOTE — ANESTHESIA POSTPROCEDURE EVALUATION
Patient: Esha Boogie    Procedure Summary     Date: 11/14/22 Room / Location: [facility] OR  / [facility] HYBRID OR 12    Anesthesia Start: 1015 Anesthesia Stop: 1105    Procedure: RIGHT SAPHENOUS VEIN RADIO FREQUENCY ABLATION (Right: Leg Lower) Diagnosis:       Chronic venous hypertension with inflammation involving both sides      Preop testing      (Chronic venous hypertension with inflammation involving both sides [I87.323])      (Preop testing [Z01.818])    Surgeons: Serafin Sher DO Provider: Darinel Ruelas CRNA    Anesthesia Type: MAC ASA Status: 3          Anesthesia Type: MAC    Vitals  Vitals Value Taken Time   /58 11/14/22 1116   Temp     Pulse 65 11/14/22 1121   Resp 18 11/14/22 1057   SpO2 96 % 11/14/22 1121   Vitals shown include unvalidated device data.        Post Anesthesia Care and Evaluation    Patient location during evaluation: PACU  Patient participation: complete - patient participated  Level of consciousness: awake and alert  Pain management: adequate    Airway patency: patent  Anesthetic complications: No anesthetic complications  PONV Status: none  Cardiovascular status: acceptable and hemodynamically stable  Respiratory status: acceptable  Hydration status: acceptable    Comments: Blood pressure 110/53, pulse 64, temperature 97.4 °F (36.3 °C), temperature source Temporal, resp. rate 18, SpO2 96 %, not currently breastfeeding.    Patient discharged from PACU based upon Andree score. Please see RN notes for further details

## 2022-11-14 NOTE — H&P
11/13/2022         Kandis Snider, CASSIDY  [address]\[address]  [facility] [address]  [place] [address]     Esha Boogie  1950          Chief Complaint   Patient presents with   • Follow-up       Patient is here for 3 month follow up of chronic venous hypertension with inflammation involving both sides . Pt was advised to wear compression and elevate when possible.  Last seen in the office on 07/15/22.  Pt states that this regimen is working for her.  Denies pain/swelling. Denies stroke symptoms.          Dear CASSIDY Rodriguez:        HPI  I had the pleasure of seeing your patient Esha Boogie in the office today.    As you recall, Esha Boogie is a 72 y.o.  female who you are currently following for hypertension and dyspnea.  She has been having complaints of lower extremity swelling.  She has been wearing compression stockings over the past few months and reports these are helping with some of her symptoms.  She does continue to have some heaviness, tiredness, aching and cramping to her legs.  She did have noninvasive testing showing significant venous insufficiency to bilateral lower extremities.      Past Medical History:   Diagnosis Date   • CHF (congestive heart failure) (HCC)    • GERD (gastroesophageal reflux disease)    • Hyperlipidemia    • Hypertension    • Peripheral vascular disease (HCC)    • PONV (postoperative nausea and vomiting)    • Sleep apnea     pt sleeps with a bipap machine     Past Surgical History:   Procedure Laterality Date   • HYSTERECTOMY      Partial   • TOTAL HIP ARTHROPLASTY Bilateral      Family History   Problem Relation Age of Onset   • Hypertension Mother    • Hypertension Father      Social History     Tobacco Use   • Smoking status: Never   • Smokeless tobacco: Never   Vaping Use   • Vaping Use: Never used   Substance Use Topics   • Alcohol use: Never   • Drug use: Never     Allergies   Allergen Reactions   • Entresto [Sacubitril-Valsartan] Itching and Rash   •  "Latex Rash   • Silicone Rash     Current Outpatient Medications   Medication Instructions   • amLODIPine (NORVASC) 2.5 mg, Oral, Daily   • cloNIDine (CATAPRES) 0.1 mg, Oral, 3 Times Daily   • dapagliflozin Propanediol 10 mg, Oral, Daily   • furosemide (LASIX) 40 mg, Oral, Daily PRN   • hydroCHLOROthiazide (HYDRODIURIL) 50 mg, Oral, Daily   • lisinopril (PRINIVIL,ZESTRIL) 40 mg, Oral, Daily   • metoprolol succinate XL (TOPROL-XL) 100 mg, Oral, Daily   • multivitamin with minerals (PRESERVISION AREDS PO) 1 tablet, Oral, Daily   • multivitamin with minerals tablet tablet 1 tablet, Oral, 2 Times Daily, Multi-vitamin along with a Preservision   • omeprazole (PRILOSEC) 20 mg, Oral, Daily   • simvastatin (ZOCOR) 20 mg, Oral, Nightly   • vitamin C (ASCORBIC ACID) 1,000 mg, Oral, Daily          Review of Systems   Constitutional: Negative.    HENT: Negative.    Eyes: Negative.    Respiratory: Negative.    Cardiovascular: Positive for leg swelling.        Leg cramping   Gastrointestinal: Negative.    Endocrine: Negative.    Genitourinary: Negative.    Musculoskeletal: Negative.    Skin: Negative.    Allergic/Immunologic: Negative.    Neurological: Negative.    Hematological: Negative.    Psychiatric/Behavioral: Negative.          /76   Pulse 85   Ht 152.4 cm (60\")   Wt 77.6 kg (171 lb)   SpO2 96%   BMI 33.40 kg/m²   Physical Exam  Vitals and nursing note reviewed.   Constitutional:       General: She is not in acute distress.     Appearance: Normal appearance. She is well-developed. She is not diaphoretic.   HENT:      Head: Normocephalic and atraumatic.   Eyes:      General: No scleral icterus.     Pupils: Pupils are equal, round, and reactive to light.   Neck:      Thyroid: No thyromegaly.      Vascular: No carotid bruit or JVD.   Cardiovascular:      Rate and Rhythm: Normal rate and regular rhythm.      Pulses: Normal pulses.      Heart sounds: Normal heart sounds and S2 normal. No murmur heard.    No friction " rub. No gallop.   Pulmonary:      Effort: Pulmonary effort is normal.      Breath sounds: Normal breath sounds.   Abdominal:      General: Bowel sounds are normal.      Palpations: Abdomen is soft.   Musculoskeletal:         General: Swelling present. Normal range of motion.      Cervical back: Normal range of motion and neck supple.   Skin:     General: Skin is warm and dry.   Neurological:      Mental Status: She is alert and oriented to person, place, and time.      Cranial Nerves: No cranial nerve deficit.   Psychiatric:         Mood and Affect: Mood normal.         Behavior: Behavior normal.         Thought Content: Thought content normal.         Judgment: Judgment normal.                  US VENOUS DOPPLER LOWER EXTREMITY BILATERAL (DUPLEX)- 7/11/2022 1:26 PM  CDT     HISTORY: bilateral lower extremity edema; R60.0-Localized edema     FINDINGS:  COMPARISON: NONE     FINDINGS:  Transverse and longitudinal grayscale, color Doppler and spectral images  of the bilateral lower extremities were obtained. Venous insufficiency  exam. Additional grayscale, color Doppler, spectral analysis performed  of the bilateral lower extremity deep veins from the distal common  femoral through the proximal anterior posterior tibial and peroneal  veins performed.     There is normal color Doppler flow, compressibility, and augmentation of  the deep veins of the bilateral common femoral, superficial femoral,  deep profundus, popliteal veins. Appropriate color Doppler flow within  the bilateral anterior and posterior tibial as well as peroneal veins.  No evidence of deep vein thrombosis within the bilateral lower  extremities at this time.      RIGHT LOWER EXTREMITY:     GSV-SFJ: 6.9 mm. 1233 ms reflux  GSV -Mid thigh: 3.8 mm. No reflux.  GSV- Above-knee: 3.0 mm. No reflux.  GSV- midcalf: 2.3 mm.  2117 ms reflux  GSV- ankle: 2.1 mm. No reflux.     No venous thrombus identified in the greater saphenous vein.     Suboptimal  visualization of the right lower extremity lesser saphenous  vein.     LEFT LOWER EXTREMITY:     GSV-SFJ: 4.4 mm. No reflux.  GSV -Mid thigh: 3.3 mm. 1728 ms reflux  GSV- Above-knee: 3.3 mm. 2717 ms reflux  GSV- midcalf: 1.7 mm. No reflux.  GSV- ankle: 2.3 mm. 1256 ms reflux     No venous thrombus identified in the greater saphenous vein.     LSV-knee: 3.0 mm.  No reflux.  LSV-mid calf: 2.0 mm. 1561 ms reflux  LSV-ankle: 1.6 mm. No reflux.     No venous thrombus identified in the lesser saphenous vein.     IMPRESSION:  1. No evidence of deep vein thrombosis within the bilateral lower  extremities at this time.  2. There is venous reflux/insufficiency within the bilateral greater  saphenous veins and the left mid lesser saphenous the level of the  midcalf with measurements provided above. Suboptimal visualization the  right lower extremity lesser saphenous vein on today's exam.  This report was finalized on 07/11/2022 17:37 by Dr. Lucia Austin MD.         Patient Active Problem List   Diagnosis   • Essential hypertension   • WILLSON (dyspnea on exertion)   • Mixed hyperlipidemia   • Venous insufficiency   • COVID-19 vaccine series completed   • KATY treated with BiPAP   • Class 2 severe obesity due to excess calories with serious comorbidity and body mass index (BMI) of 35.0 to 35.9 in adult ([facility])   • Chronic diastolic congestive heart failure ([facility])         Visit Diagnosis       ICD-10-CM ICD-9-CM   1. Chronic venous hypertension with inflammation involving both sides  I87.323 459.32   2. Essential hypertension  I10 401.9   3. Mixed hyperlipidemia  E78.2 272.2   4. Preop testing  Z01.818 V72.84   5. Varicose veins of other specified sites  I86.8 456.8                  Plan: After thoroughly evaluating Esha GUERDA Boogie, I believe the best course of action is to proceed with radiofrequency ablation of the right lower extremity, followed by the left shortly thereafter.  Her previous testing did show venous insufficiency  to bilateral lower extremities.  Risks of radiofrequency ablation include, but are not limited to, bleeding, infection, vessel damage, nerve damage, DVT, phlebitis, and pulmonary embolus.  The patient understands these risks and wishes to proceed with procedure. I did discuss vascular risk factors as they pertain to the progression of vascular disease including controlling her hypertension and hyperlipidemia.  Her blood pressure stable on her current medications.  She is maintained on Zocor for hyperlipidemia. The patient can continue taking their current medication regimen as previously planned.  This was all discussed in full with complete understanding.     Thank you for allowing me to participate in the care of your patient.  Please do not hesitate with any questions or concerns.  I will keep you aware of any further encounters with Esha Boogie.           Sincerely yours,           Serafin Sher, DO

## 2022-11-18 ENCOUNTER — TELEPHONE (OUTPATIENT)
Dept: VASCULAR SURGERY | Facility: CLINIC | Age: 72
End: 2022-11-18

## 2022-11-21 ENCOUNTER — HOSPITAL ENCOUNTER (OUTPATIENT)
Dept: ULTRASOUND IMAGING | Facility: HOSPITAL | Age: 72
Discharge: HOME | End: 2022-11-21
Admitting: SURGERY

## 2022-11-21 ENCOUNTER — TELEPHONE (OUTPATIENT)
Dept: VASCULAR SURGERY | Facility: CLINIC | Age: 72
End: 2022-11-21

## 2022-11-21 ENCOUNTER — APPOINTMENT (OUTPATIENT)
Dept: ULTRASOUND IMAGING | Facility: HOSPITAL | Age: 72
End: 2022-11-21

## 2022-11-21 DIAGNOSIS — I87.323 CHRONIC VENOUS HYPERTENSION WITH INFLAMMATION INVOLVING BOTH SIDES: ICD-10-CM

## 2022-11-21 PROCEDURE — 93971 EXTREMITY STUDY: CPT | Performed by: SURGERY

## 2022-11-21 PROCEDURE — 93971 EXTREMITY STUDY: CPT

## 2022-11-21 NOTE — TELEPHONE ENCOUNTER
Notified pt that CASSIDY Loredo is out of the office today due to illness.  Pt wants to come in for testing today and is rescheduling f/u on 11/30/2022 at 11:15.

## 2022-11-29 ENCOUNTER — TELEPHONE (OUTPATIENT)
Dept: VASCULAR SURGERY | Facility: CLINIC | Age: 72
End: 2022-11-29

## 2022-11-30 ENCOUNTER — OFFICE VISIT (OUTPATIENT)
Dept: VASCULAR SURGERY | Facility: CLINIC | Age: 72
End: 2022-11-30

## 2022-11-30 VITALS
SYSTOLIC BLOOD PRESSURE: 118 MMHG | BODY MASS INDEX: 34.75 KG/M2 | OXYGEN SATURATION: 97 % | HEIGHT: 60 IN | WEIGHT: 177 LBS | HEART RATE: 63 BPM | DIASTOLIC BLOOD PRESSURE: 64 MMHG

## 2022-11-30 DIAGNOSIS — I10 ESSENTIAL HYPERTENSION: ICD-10-CM

## 2022-11-30 DIAGNOSIS — E78.2 MIXED HYPERLIPIDEMIA: ICD-10-CM

## 2022-11-30 DIAGNOSIS — I87.323 CHRONIC VENOUS HYPERTENSION WITH INFLAMMATION INVOLVING BOTH SIDES: Primary | ICD-10-CM

## 2022-11-30 PROCEDURE — 99214 OFFICE O/P EST MOD 30 MIN: CPT | Performed by: NURSE PRACTITIONER

## 2022-11-30 NOTE — PROGRESS NOTES
"11/30/2022       Suman Jeffrey PA  [address] [place] [address] [place] [address]    Esha Boogie  1950    Chief Complaint   Patient presents with   • Post-op Follow-up     Right SFA on 11/14/22. VVI done 11/21/22. No issues stated.        Dear Suman Jeffrey PA       HPI  I had the pleasure of seeing your patient Esha Boogie in the office today.    As you recall, Esha Boogie is a 72 y.o.  female who you are currently following for routine health maintenance.  She has been having complaints of lower extremity swelling.  She has been wearing compression stockings over the past few months and reports these are helping with some of her symptoms.  She does continue to have some heaviness, tiredness, aching and cramping to her legs.  She did undergo radiofrequency ablation of the right lower extremity greater saphenous vein on 11/14/2022.  She does report overall her leg feels less heavy tired and she has had no further cramping to her right leg.  She did have noninvasive testing performed today, which I did review in office.     Review of Systems   Constitutional: Negative.    HENT: Negative.    Eyes: Negative.    Respiratory: Negative.    Cardiovascular: Positive for leg swelling.        Leg cramping   Gastrointestinal: Negative.    Endocrine: Negative.    Genitourinary: Negative.    Musculoskeletal: Negative.    Skin: Negative.    Allergic/Immunologic: Negative.    Neurological: Negative.    Hematological: Negative.    Psychiatric/Behavioral: Negative.         /64   Pulse 63   Ht 152.4 cm (60\")   Wt 80.3 kg (177 lb)   SpO2 97%   BMI 34.57 kg/m²   Physical Exam  Vitals and nursing note reviewed.   Constitutional:       General: She is not in acute distress.     Appearance: Normal appearance. She is well-developed. She is not diaphoretic.   HENT:      Head: Normocephalic and atraumatic.   Eyes:      General: No scleral icterus.     Pupils: Pupils are equal, round, and reactive to light.   Neck:      " Thyroid: No thyromegaly.      Vascular: No carotid bruit or JVD.   Cardiovascular:      Rate and Rhythm: Normal rate and regular rhythm.      Pulses: Normal pulses.      Heart sounds: Normal heart sounds and S2 normal. No murmur heard.    No friction rub. No gallop.   Pulmonary:      Effort: Pulmonary effort is normal.      Breath sounds: Normal breath sounds.   Abdominal:      General: Bowel sounds are normal.      Palpations: Abdomen is soft.   Musculoskeletal:         General: Swelling present. Normal range of motion.      Cervical back: Normal range of motion and neck supple.   Skin:     General: Skin is warm and dry.   Neurological:      Mental Status: She is alert and oriented to person, place, and time.      Cranial Nerves: No cranial nerve deficit.   Psychiatric:         Mood and Affect: Mood normal.         Behavior: Behavior normal.         Thought Content: Thought content normal.         Judgment: Judgment normal.        Diagnostic data:  US Guided Vascular Access    Result Date: 11/14/2022  Narrative: History: Swelling  Comments: Grayscale imaging as well as color flow duplex were used to evaluate the right lower extremity greater saphenous vein during venous closure.  The greater saphenous vein was successfully cannulated just below the knee. The catheter was placed up to the junction and pulled back 3 cm and marked.      Impression: Successful endovenous closure of the right lower extremity greater saphenous vein. This report was finalized on 11/14/2022 15:38 by Dr. Serafin Sher MD.    US Venous Doppler Lower Extremity Right (duplex)    Result Date: 11/21/2022  Narrative: History: Swelling      Impression: Impression: 1. There is no evidence of deep venous thrombosis of the right lower extremity. 2. The greater saphenous vein is closed from zones 2 through 7.  Comments: Right lower extremity venous duplex exam was performed using color Doppler flow, Doppler wave form analysis, and grayscale  imaging, with and without compression. There is no evidence of deep venous thrombosis of the common femoral, superficial femoral, popliteal, posterior tibial, and peroneal veins. There is no thrombus identified in the saphenofemoral junction.  This report was finalized on 11/21/2022 16:04 by Dr. Serafin Sher MD.             US VENOUS DOPPLER LOWER EXTREMITY BILATERAL (DUPLEX)- 7/11/2022 1:26 PM  CDT     HISTORY: bilateral lower extremity edema; R60.0-Localized edema     FINDINGS:  COMPARISON: NONE     FINDINGS:  Transverse and longitudinal grayscale, color Doppler and spectral images  of the bilateral lower extremities were obtained. Venous insufficiency  exam. Additional grayscale, color Doppler, spectral analysis performed  of the bilateral lower extremity deep veins from the distal common  femoral through the proximal anterior posterior tibial and peroneal  veins performed.     There is normal color Doppler flow, compressibility, and augmentation of  the deep veins of the bilateral common femoral, superficial femoral,  deep profundus, popliteal veins. Appropriate color Doppler flow within  the bilateral anterior and posterior tibial as well as peroneal veins.  No evidence of deep vein thrombosis within the bilateral lower  extremities at this time.      RIGHT LOWER EXTREMITY:     GSV-SFJ: 6.9 mm. 1233 ms reflux  GSV -Mid thigh: 3.8 mm. No reflux.  GSV- Above-knee: 3.0 mm. No reflux.  GSV- midcalf: 2.3 mm.  2117 ms reflux  GSV- ankle: 2.1 mm. No reflux.     No venous thrombus identified in the greater saphenous vein.     Suboptimal visualization of the right lower extremity lesser saphenous  vein.     LEFT LOWER EXTREMITY:     GSV-SFJ: 4.4 mm. No reflux.  GSV -Mid thigh: 3.3 mm. 1728 ms reflux  GSV- Above-knee: 3.3 mm. 2717 ms reflux  GSV- midcalf: 1.7 mm. No reflux.  GSV- ankle: 2.3 mm. 1256 ms reflux     No venous thrombus identified in the greater saphenous vein.     LSV-knee: 3.0 mm.  No reflux.  LSV-mid  calf: 2.0 mm. 1561 ms reflux  LSV-ankle: 1.6 mm. No reflux.     No venous thrombus identified in the lesser saphenous vein.     IMPRESSION:  1. No evidence of deep vein thrombosis within the bilateral lower  extremities at this time.  2. There is venous reflux/insufficiency within the bilateral greater  saphenous veins and the left mid lesser saphenous the level of the  midcalf with measurements provided above. Suboptimal visualization the  right lower extremity lesser saphenous vein on today's exam.  This report was finalized on 07/11/2022 17:37 by Dr. Lucia Ausitn MD.    Patient Active Problem List   Diagnosis   • Essential hypertension   • WILLSON (dyspnea on exertion)   • Mixed hyperlipidemia   • Venous insufficiency   • COVID-19 vaccine series completed   • KATY treated with BiPAP   • Class 1 obesity due to excess calories with serious comorbidity and body mass index (BMI) of 33.0 to 33.9 in adult   • Chronic diastolic congestive heart failure (HCC)   • Chronic venous hypertension with inflammation involving both sides   • Preop testing         ICD-10-CM ICD-9-CM   1. Chronic venous hypertension with inflammation involving both sides  I87.323 459.32   2. Mixed hyperlipidemia  E78.2 272.2   3. Essential hypertension  I10 401.9           Plan: After thoroughly evaluating Esha Boogie, I am pleased to report she is doing well status post radiofrequency ablation of the right lower extremity.  She states overall her leg is feeling better.  I did review her testing which shows a greater saphenous vein closed as expected with no evidence of DVT.  She is free to resume normal activity.  She would like to proceed with radiofrequency ablation of the left lower extremity greater saphenous vein.  Risks of radiofrequency ablation include, but are not limited to, bleeding, infection, vessel damage, nerve damage, DVT, phlebitis, and pulmonary embolus.  The patient understands these risks and wishes to proceed with  procedure.  I would like her to continue wearing compression stockings on a daily basis and keep her legs elevated when she is not on them.  I did discuss vascular risk factors as they pertain to the progression of vascular disease including controlling her hypertension and hyperlipidemia.  Her blood pressure stable on her current medications.  She is maintained on Zocor for hyperlipidemia.   The patient can continue taking their current medication regimen as previously planned.  This was all discussed in full with complete understanding.    Thank you for allowing me to participate in the care of your patient.  Please do not hesitate with any questions or concerns.  I will keep you aware of any further encounters with Esha Boogie.        Sincerely yours,         CASSIDY Loredo

## 2022-12-01 ENCOUNTER — TELEPHONE (OUTPATIENT)
Dept: VASCULAR SURGERY | Facility: CLINIC | Age: 72
End: 2022-12-01

## 2022-12-01 NOTE — TELEPHONE ENCOUNTER
Pt expressed understanding of pre-work and surgical instructions for surgery on 01/16/2023 with Christos Mason.

## 2022-12-05 ENCOUNTER — TELEPHONE (OUTPATIENT)
Dept: VASCULAR SURGERY | Facility: CLINIC | Age: 72
End: 2022-12-05

## 2022-12-05 NOTE — TELEPHONE ENCOUNTER
Caller: Esha Boogie    Relationship: Self    Best call back number: [phone]    What is the best time to reach you: ANYTIME    Who are you requesting to speak with (clinical staff, provider,  specific staff member): CLINICAL     Do you know the name of the person who called: ESHA     What was the call regarding: THE PATIENT HAS PAT ON 1/9/2023, BUT HAS ALREADY DONE PAT FOR A DIFFERENT PROCEDURE SHE HAD THIS YEAR. SHE IS WONDERING IF SHE NEEDS TO KEEP THE PAT ON 1/9/2023 FOR HER SURGERY ON 1/16/2023    Do you require a callback: YES

## 2022-12-06 ENCOUNTER — OFFICE VISIT (OUTPATIENT)
Dept: CARDIOLOGY | Facility: CLINIC | Age: 72
End: 2022-12-06

## 2022-12-06 VITALS
OXYGEN SATURATION: 99 % | BODY MASS INDEX: 34.95 KG/M2 | HEART RATE: 58 BPM | SYSTOLIC BLOOD PRESSURE: 126 MMHG | HEIGHT: 60 IN | WEIGHT: 178 LBS | DIASTOLIC BLOOD PRESSURE: 76 MMHG

## 2022-12-06 DIAGNOSIS — E66.09 CLASS 1 OBESITY DUE TO EXCESS CALORIES WITH SERIOUS COMORBIDITY AND BODY MASS INDEX (BMI) OF 34.0 TO 34.9 IN ADULT: ICD-10-CM

## 2022-12-06 DIAGNOSIS — I87.2 VENOUS INSUFFICIENCY: ICD-10-CM

## 2022-12-06 DIAGNOSIS — I50.32 CHRONIC DIASTOLIC CONGESTIVE HEART FAILURE: ICD-10-CM

## 2022-12-06 DIAGNOSIS — I10 ESSENTIAL HYPERTENSION: Primary | ICD-10-CM

## 2022-12-06 DIAGNOSIS — E78.2 MIXED HYPERLIPIDEMIA: ICD-10-CM

## 2022-12-06 DIAGNOSIS — G47.33 OSA TREATED WITH BIPAP: ICD-10-CM

## 2022-12-06 PROCEDURE — 99214 OFFICE O/P EST MOD 30 MIN: CPT | Performed by: NURSE PRACTITIONER

## 2022-12-06 NOTE — PROGRESS NOTES
Chief Complaint  Congestive Heart Failure (6wk F/U. Started Aldactone LOV) and Results (Lab)     Subjective          Esha Boogie presents to [facility] [id number] for routine follow-up of medication adjustments.  She was started on spironolactone 25 mg daily at her last office visit on 10/27/2022.  Follow-up BMP on 11/3/2022 revealed normal creatinine and potassium. She has chronic diastolic congestive heart failure, hypertension, hyperlipidemia, obstructive sleep apnea, GERD, venous insufficiency and obesity. She complains of chronic bilateral lower extremity edema, however she states this has improved since starting spironolactone. She reports occasional palpitations. Patient denies chest pain, shortness of breath, dizziness, syncope, orthopnea, PND or decreased stamina.  Patient denies any signs of bleeding.    Hypertension  This is a chronic problem. The current episode started more than 1 year ago. The problem is uncontrolled. Associated symptoms include palpitations and peripheral edema. Pertinent negatives include no anxiety, blurred vision, chest pain, headaches, malaise/fatigue, neck pain, orthopnea, PND, shortness of breath or sweats. Risk factors for coronary artery disease include obesity, dyslipidemia and post-menopausal state. Current antihypertension treatment includes central alpha agonists, ACE inhibitors, beta blockers and diuretics. The current treatment provides significant improvement. Hypertensive end-organ damage includes heart failure. Identifiable causes of hypertension include sleep apnea.   Hyperlipidemia  This is a chronic problem. The current episode started more than 1 year ago. The problem is controlled. Exacerbating diseases include obesity. Pertinent negatives include no chest pain or shortness of breath. Current antihyperlipidemic treatment includes statins. Risk factors for coronary artery disease include hypertension, obesity, post-menopausal and  dyslipidemia.   Congestive Heart Failure  Presents for follow-up visit. Associated symptoms include edema and palpitations. Pertinent negatives include no abdominal pain, chest pain, chest pressure, claudication, fatigue, muscle weakness, near-syncope, nocturia, orthopnea, paroxysmal nocturnal dyspnea, shortness of breath or unexpected weight change. The symptoms have been stable. Compliance with total regimen is 51-75%. Compliance with diet is 51-75%. Compliance with exercise is 51-75%. Compliance with medications is %.     I have reviewed and confirmed the accuracy of the CASSIDY Neal          Objective     Current Outpatient Medications:   •  amLODIPine (NORVASC) 2.5 MG tablet, Take 1 tablet by mouth Daily., Disp: 30 tablet, Rfl: 11  •  cloNIDine (Catapres) 0.1 MG tablet, Take 1 tablet by mouth 3 (Three) Times a Day. (Patient taking differently: Take 0.1 mg by mouth 3 (Three) Times a Day As Needed for High Blood Pressure.), Disp: 270 tablet, Rfl: 3  •  dapagliflozin Propanediol 10 MG tablet, Take 10 mg by mouth Daily., Disp: 30 tablet, Rfl: 11  •  furosemide (LASIX) 40 MG tablet, Take 1 tablet by mouth Daily As Needed (daily as needed for increased shortness of breath, swelling and/or weight gain)., Disp: 90 tablet, Rfl: 3  •  hydroCHLOROthiazide (HYDRODIURIL) 50 MG tablet, Take 1 tablet by mouth Daily., Disp: 90 tablet, Rfl: 3  •  HYDROcodone-acetaminophen (NORCO) 5-325 MG per tablet, Take 1 tablet by mouth Every 6 (Six) Hours As Needed (Pain)., Disp: 10 tablet, Rfl: 0  •  lisinopril (PRINIVIL,ZESTRIL) 40 MG tablet, Take 1 tablet by mouth Daily., Disp: 90 tablet, Rfl: 3  •  metoprolol succinate XL (TOPROL-XL) 100 MG 24 hr tablet, Take 100 mg by mouth Daily., Disp: , Rfl:   •  multivitamin with minerals tablet tablet, Take 1 tablet by mouth 2 (Two) Times a Day. Multi-vitamin along with a Preservision, Disp: , Rfl:   •  multivitamin with minerals tablet tablet, Take 1 tablet by mouth Daily.,  "Disp: , Rfl:   •  omeprazole (priLOSEC) 20 MG capsule, Take 20 mg by mouth Daily., Disp: , Rfl:   •  simvastatin (ZOCOR) 20 MG tablet, Take 20 mg by mouth Every Night., Disp: , Rfl:   Vital Signs:   /76   Pulse 58   Ht 152.4 cm (60\")   Wt 80.7 kg (178 lb)   SpO2 99%   BMI 34.76 kg/m²     Vitals and nursing note reviewed.   Constitutional:       General: Not in acute distress.     Appearance: Normal and healthy appearance. Well-developed and not in distress. Obese. Not diaphoretic.   Eyes:      General: Lids are normal.         Right eye: No discharge.         Left eye: No discharge.      Conjunctiva/sclera: Conjunctivae normal.      Pupils: Pupils are equal, round, and reactive to light.   HENT:      Head: Normocephalic and atraumatic.      Jaw: There is normal jaw occlusion.      Right Ear: External ear normal.      Left Ear: External ear normal.      Nose: Nose normal.   Neck:      Thyroid: No thyromegaly.      Vascular: No carotid bruit, JVD or JVR. JVD normal.      Trachea: Trachea normal. No tracheal deviation.   Pulmonary:      Effort: Pulmonary effort is normal. No respiratory distress.      Breath sounds: Normal breath sounds. No decreased breath sounds. No wheezing. No rhonchi. No rales.   Chest:      Chest wall: Not tender to palpatation.   Cardiovascular:      PMI at left midclavicular line. Bradycardia present. Regular rhythm. Normal S1. Normal S2.      Murmurs: There is no murmur.      No gallop. No click. No rub.   Pulses:     Intact distal pulses. No decreased pulses.   Edema:     Peripheral edema present.     Pretibial: bilateral trace edema of the pretibial area.     Ankle: bilateral trace edema of the ankle.     Feet: bilateral trace edema of the feet.  Abdominal:      General: Bowel sounds are normal. There is no distension.      Palpations: Abdomen is soft.      Tenderness: There is no abdominal tenderness.   Musculoskeletal: Normal range of motion.         General: No tenderness or " deformity.      Cervical back: Normal range of motion and neck supple. Skin:     General: Skin is warm and dry.      Coloration: Skin is not pale.      Findings: No erythema or rash.   Neurological:      General: No focal deficit present.      Mental Status: Alert, oriented to person, place, and time and oriented to person, place and time.   Psychiatric:         Attention and Perception: Attention and perception normal.         Mood and Affect: Mood and affect normal.         Speech: Speech normal.         Behavior: Behavior normal.         Thought Content: Thought content normal.         Cognition and Memory: Cognition and memory normal.         Judgment: Judgment normal.        Result Review :   The following data was reviewed by: CASSIDY Rodriguez on 12/6/2022:  Common labs    Common Labs 11/8/22 11/8/22    1317 1317   Glucose  161 (A)   BUN  24 (A)   Creatinine  0.77   Sodium  137   Potassium  4.2   Chloride  103   Calcium  9.5   WBC 10.58    Hemoglobin 13.5    Hematocrit 41.0    Platelets 297    (A) Abnormal value          Radiology exam- bilateral lower extremity venous ultrasound 7/11/22 and cardiology exam- 2d echo 9/13/22         Assessment and Plan    Diagnoses and all orders for this visit:    1. Essential hypertension (Primary)-blood pressure is well controlled. Continue spironolactone, HCTZ, lisinopril, amlodipine, clonidine and metoprolol succinate.  Monitor and record daily blood pressure. Report readings consistently higher than 130/80 or consistently lower than 100/60.     2. Mixed hyperlipidemia-management per PCP.  Continue simvastatin.    3. KATY treated with BiPAP-patient reports compliance with BiPAP.  Stable.    4. Class 1 obesity due to excess calories with serious comorbidity and body mass index (BMI) of 34.0 to 34.9 in adult- BMI is >= 30 and <35. (Class 1 Obesity). The following options were offered after discussion;: weight loss educational material (shared in after visit summary).      5.  Venous insufficiency- present on bilateral lower extremity venous ultrasound 7/11/2022.  Pt is following with Dr. Serafin Sher with vascular surgery.  Status post right lower extremity venous ablation on 11/14/2022. Left lower extremity planned for 1/16/2023.     6. Chronic diastolic congestive heart failure (HCC)- NYHA class II. Compensated.  Patient was unable to tolerate Entresto due to reported skin irritation and pruritus. Reviewed signs and symptoms of CHF and what to report with the patient. Patient instructed to restrict sodium and weigh daily. Report weight gain of greater than 2 lbs overnight or 5 lbs in 1 week. Pt verbalized understanding of instructions and plan of care.  Continue spironolactone, Farxiga, lisinopril and metoprolol succinate.       Follow Up   Return in about 3 months (around 3/6/2023) for Next scheduled follow up.  Patient was given instructions and counseling regarding her condition or for health maintenance advice. Please see specific information pulled into the AVS if appropriate.

## 2022-12-09 ENCOUNTER — APPOINTMENT (OUTPATIENT)
Dept: PREADMISSION TESTING | Facility: HOSPITAL | Age: 72
End: 2022-12-09

## 2023-01-09 ENCOUNTER — PRE-ADMISSION TESTING (OUTPATIENT)
Dept: PREADMISSION TESTING | Facility: HOSPITAL | Age: 73
End: 2023-01-09
Payer: MEDICARE

## 2023-01-09 VITALS
RESPIRATION RATE: 20 BRPM | HEART RATE: 74 BPM | BODY MASS INDEX: 36.36 KG/M2 | DIASTOLIC BLOOD PRESSURE: 60 MMHG | HEIGHT: 59 IN | WEIGHT: 180.34 LBS | SYSTOLIC BLOOD PRESSURE: 148 MMHG | OXYGEN SATURATION: 98 %

## 2023-01-09 LAB
ANION GAP SERPL CALCULATED.3IONS-SCNC: 9 MMOL/L (ref 5–15)
BUN SERPL-MCNC: 17 MG/DL (ref 8–23)
BUN/CREAT SERPL: 32.7 (ref 7–25)
CALCIUM SPEC-SCNC: 9.5 MG/DL (ref 8.6–10.5)
CHLORIDE SERPL-SCNC: 102 MMOL/L (ref 98–107)
CO2 SERPL-SCNC: 28 MMOL/L (ref 22–29)
CREAT SERPL-MCNC: 0.52 MG/DL (ref 0.57–1)
DEPRECATED RDW RBC AUTO: 48.8 FL (ref 37–54)
EGFRCR SERPLBLD CKD-EPI 2021: 98.2 ML/MIN/1.73
ERYTHROCYTE [DISTWIDTH] IN BLOOD BY AUTOMATED COUNT: 14.5 % (ref 12.3–15.4)
GLUCOSE SERPL-MCNC: 78 MG/DL (ref 65–99)
HCT VFR BLD AUTO: 46.1 % (ref 34–46.6)
HGB BLD-MCNC: 14.8 G/DL (ref 12–15.9)
MCH RBC QN AUTO: 29.4 PG (ref 26.6–33)
MCHC RBC AUTO-ENTMCNC: 32.1 G/DL (ref 31.5–35.7)
MCV RBC AUTO: 91.5 FL (ref 79–97)
PLATELET # BLD AUTO: 259 10*3/MM3 (ref 140–450)
PMV BLD AUTO: 11.6 FL (ref 6–12)
POTASSIUM SERPL-SCNC: 4.2 MMOL/L (ref 3.5–5.2)
RBC # BLD AUTO: 5.04 10*6/MM3 (ref 3.77–5.28)
SODIUM SERPL-SCNC: 139 MMOL/L (ref 136–145)
WBC NRBC COR # BLD: 11.57 10*3/MM3 (ref 3.4–10.8)

## 2023-01-09 PROCEDURE — 36415 COLL VENOUS BLD VENIPUNCTURE: CPT

## 2023-01-09 PROCEDURE — 85027 COMPLETE CBC AUTOMATED: CPT

## 2023-01-09 PROCEDURE — 80048 BASIC METABOLIC PNL TOTAL CA: CPT

## 2023-01-09 NOTE — DISCHARGE INSTRUCTIONS
Before you come to the hospital        Arrival time: AS DIRECTED BY OFFICE     YOU MAY TAKE THE FOLLOWING MEDICATION(S) THE MORNING OF SURGERY WITH A SIP OF WATER: Metoprolol    Please do not take your Lisinopril for 24 hours prior to surgery           ALL OTHER HOME MEDICATION CHECK WITH YOUR PHYSICIAN (especially if   you are taking diabetes medicines or blood thinners)    Do not take any Erectile Dysfunction medications (EX: CIALIS, VIAGRA) 24 hours prior to surgery.      If you were given and instructed to use a germ- killing soap, use as directed the night before surgery and again the morning of surgery or as directed by your surgeon. (Use one-half of the bottle with each shower.)   See attached information for How to Use Chlorhexidine for Bathing if applicable.            Eating and drinking restrictions prior to scheduled arrival time    2 Hours before arrival time STOP   Drinking Clear liquids (water, apple juice-no pulp)     6 Hours before arrival time STOP   Milk or drinks that contain milk, full liquids    6 Hours before arrival time STOP   Light meals or foods, such as toast or cereal    8 Hours before arrival time STOP   Heavy foods, such as meat, fried foods, or fatty foods    (It is extremely important that you follow these guidelines to prevent delay or cancelation of your procedure)     Clear Liquids  Water and flavored water                                                                      Clear Fruit juices, such as cranberry juice and apple juice.  Black coffee (NO cream of any kind, including powdered).  Plain tea  Clear bouillon or broth.  Flavored gelatin.  Soda.  Gatorade or Powerade.  Full liquid examples  Juices that have pulp.  Frozen ice pops that contain fruit pieces.  Coffee with creamer  Milk.  Yogurt.                MANAGING PAIN AFTER SURGERY    We know you are probably wondering what your pain will be like after surgery.  Following surgery it is unrealistic to expect you will not  have pain.   Pain is how our bodies let us know that something is wrong or cautions us to be careful.  That said, our goal is to make your pain tolerable.    Methods we may use to treat your pain include (oral or IV medications, PCAs, epidurals, nerve blocks, etc.)   While some procedures require IV pain medications for a short time after surgery, transitioning to pain medications by mouth allows for better management of pain.   Your nurse will encourage you to take oral pain medications whenever possible.  IV medications work almost immediately, but only last a short while.  Taking medications by mouth allows for a more constant level of medication in your blood stream for a longer period of time.      Once your pain is out of control it is harder to get back under control.  It is important you are aware when your next dose of pain medication is due.  If you are admitted, your nurse may write the time of your next dose on the white board in your room to help you remember.      We are interested in your pain and encourage you to inform us about aggravating factors during your visit.   Many times a simple repositioning every few hours can make a big difference.    If your physician says it is okay, do not let your pain prevent you from getting out of bed. Be sure to call your nurse for assistance prior to getting up so you do not fall.      Before surgery, please decide your tolerable pain goal.  These faces help describe the pain ratings we use on a 0-10 scale.   Be prepared to tell us your goal and whether or not you take pain or anxiety medications at home.          Preparing for Surgery  Preparing for surgery is an important part of your care. It can make things go more smoothly and help you avoid complications. The steps leading up to surgery may vary among hospitals. Follow all instructions given to you by your health care providers. Ask questions if you do not understand something. Talk about any concerns that  you have.  Here are some questions to consider asking before your surgery:  If my surgery is not an emergency (is elective), when would be the best time to have the surgery?  What arrangements do I need to make for work, home, or school?  What will my recovery be like? How long will it be before I can return to normal activities?  Will I need to prepare my home? Will I need to arrange care for me or my children?  Should I expect to have pain after surgery? What are my pain management options? Are there nonmedical options that I can try for pain?  Tell a health care provider about:  Any allergies you have.  All medicines you are taking, including vitamins, herbs, eye drops, creams, and over-the-counter medicines.  Any problems you or family members have had with anesthetic medicines.  Any blood disorders you have.  Any surgeries you have had.  Any medical conditions you have.  Whether you are pregnant or may be pregnant.  What are the risks?  The risks and complications of surgery depend on the specific procedure that you have. Discuss all the risks with your health care providers before your surgery. Ask about common surgical complications, which may include:  Infection.  Bleeding or a need for blood replacement (transfusion).  Allergic reactions to medicines.  Damage to surrounding nerves, tissues, or structures.  A blood clot.  Scarring.  Failure of the surgery to correct the problem.  Follow these instructions before the procedure:  Several days or weeks before your procedure  You may have a physical exam by your primary health care provider to make sure it is safe for you to have surgery.  You may have testing. This may include a chest X-ray, blood and urine tests, electrocardiogram (ECG), or other testing.  Ask your health care provider about:  Changing or stopping your regular medicines. This is especially important if you are taking diabetes medicines or blood thinners.  Taking medicines such as aspirin and  ibuprofen. These medicines can thin your blood. Do not take these medicines unless your health care provider tells you to take them.  Taking over-the-counter medicines, vitamins, herbs, and supplements.  Do not use any products that contain nicotine or tobacco, such as cigarettes and e-cigarettes. If you need help quitting, ask your health care provider.  Avoid alcohol.  Ask your health care provider if there are exercises you can do to prepare for surgery.  Eat a healthy diet.   Plan to have someone take you home from the hospital or clinic.  Plan to have a responsible adult care for you for at least 24 hours after you leave the hospital or clinic. This is important.  The day before your procedure  You may be given antibiotic medicine to take by mouth to help prevent infection. Take it as told by your health care provider.  You may be asked to shower with a germ-killing soap.  Follow instructions from your health care provider about eating and drinking restrictions. This includes gum, mints and hard candy.  Pack comfortable clothes according to your procedure.   The day of your procedure  You may need to take another shower with a germ-killing soap before you leave home in the morning.  With a small sip of water, take only the medicines that you are told to take.  Remove all jewelry including rings.   Leave anything you consider valuable at home except hearing aids if needed.  You do not need to bring your home medications into the hospital.   Do not wear any makeup, nail polish, powder, deodorant, lotion, hair accessories, or anything on your skin or body except your clothes.  If you will be staying in the hospital, bring a case to hold your glasses, contacts, or dentures. You may also want to bring your robe and non-skid footwear.       (Do not use denture adhesives since you will be asked to remove them during  surgery).   If you wear oxygen at home, bring it with you the day of surgery.  If instructed by your  health care provider, bring your sleep apnea device with you on the day of your surgery (if this applies to you).  You may want to leave your suitcase and sleep apnea device in the car until after surgery.   Arrive at the hospital as scheduled.  Bring a friend or family member with you who can help to answer questions and be present while you meet with your health care provider.  At the hospital  When you arrive at the hospital:  Go to registration located at the main entrance of the hospital. You will be registered and given a beeper and a sticker sheet. Take the stickers to the Outpatient nurses desk and place in the black tray. This is to notify staff that you have arrived. Then return to the lobby to wait.   When your beeper lights up and vibrates proceed through the double doors, under the stairs, and a member of the Outpatient Surgery staff will escort you to your preoperative room.  You may have to wear compression sleeves. These help to prevent blood clots and reduce swelling in your legs.  An IV may be inserted into one of your veins.              In the operating room, you may be given one or more of the following:        A medicine to help you relax (sedative).        A medicine to numb the area (local anesthetic).        A medicine to make you fall asleep (general anesthetic).        A medicine that is injected into an area of your body to numb everything below the                      injection site (regional anesthetic).  You may be given an antibiotic through your IV to help prevent infection.  Your surgical site will be marked or identified.    Contact a health care provider if you:  Develop a fever of more than 100.4°F (38°C) or other feelings of illness during the 48 hours before your surgery.  Have symptoms that get worse.  Have questions or concerns about your surgery.  Summary  Preparing for surgery can make the procedure go more smoothly and lower your risk of complications.  Before surgery,  make a list of questions and concerns to discuss with your surgeon. Ask about the risks and possible complications.  In the days or weeks before your surgery, follow all instructions from your health care provider. You may need to stop smoking, avoid alcohol, follow eating restrictions, and change or stop your regular medicines.  Contact your surgeon if you develop a fever or other signs of illness during the few days before your surgery.  This information is not intended to replace advice given to you by your health care provider. Make sure you discuss any questions you have with your health care provider.  Document Revised: 12/21/2018 Document Reviewed: 10/23/2018  [employer] Patient Education © 2021 [employer]

## 2023-01-13 ENCOUNTER — TELEPHONE (OUTPATIENT)
Dept: VASCULAR SURGERY | Facility: CLINIC | Age: 73
End: 2023-01-13
Payer: MEDICARE

## 2023-01-13 NOTE — H&P
1/13/2023         Suman Jeffrey PA  [address] [place] [address] [place] [address]     Esha Boogie  1950          Chief Complaint   Patient presents with   • Post-op Follow-up       Right SFA on 11/14/22. VVI done 11/21/22. No issues stated.          Dear Suman Jeffrey PA         HPI  I had the pleasure of seeing your patient Esha Boogie in the office today.    As you recall, Esha Boogie is a 72 y.o.  female who you are currently following for routine health maintenance.  She has been having complaints of lower extremity swelling.  She has been wearing compression stockings over the past few months and reports these are helping with some of her symptoms.  She does continue to have some heaviness, tiredness, aching and cramping to her legs.  She did undergo radiofrequency ablation of the right lower extremity greater saphenous vein on 11/14/2022.  She does report overall her leg feels less heavy tired and she has had no further cramping to her right leg.  She did have noninvasive testing performed today, which I did review in office.      Past Medical History:   Diagnosis Date   • CHF (congestive heart failure) (HCC)    • GERD (gastroesophageal reflux disease)    • Hyperlipidemia    • Hypertension    • Peripheral vascular disease (HCC)    • PONV (postoperative nausea and vomiting)    • Sleep apnea     pt sleeps with a bipap machine     Past Surgical History:   Procedure Laterality Date   • HYSTERECTOMY      Partial   • TOTAL HIP ARTHROPLASTY Bilateral    • VARICOSE VEIN SURGERY Right 11/14/2022    Procedure: RIGHT SAPHENOUS VEIN RADIO FREQUENCY ABLATION;  Surgeon: Serafin Sher DO;  Location: [facility] OR ;  Service: Vascular;  Laterality: Right;     Family History   Problem Relation Age of Onset   • Hypertension Mother    • Hypertension Father      Social History     Tobacco Use   • Smoking status: Never   • Smokeless tobacco: Never   Vaping Use   • Vaping Use: Never used   Substance Use  "Topics   • Alcohol use: Never   • Drug use: Never     Allergies   Allergen Reactions   • Entresto [Sacubitril-Valsartan] Itching and Rash   • Latex Rash   • Silicone Rash     Current Outpatient Medications   Medication Instructions   • amLODIPine (NORVASC) 2.5 mg, Oral, Daily   • cloNIDine (CATAPRES) 0.1 mg, Oral, 3 Times Daily   • dapagliflozin Propanediol 10 mg, Oral, Daily   • furosemide (LASIX) 40 mg, Oral, Daily PRN   • hydroCHLOROthiazide (HYDRODIURIL) 50 mg, Oral, Daily   • lisinopril (PRINIVIL,ZESTRIL) 40 mg, Oral, Daily   • metoprolol succinate XL (TOPROL-XL) 100 mg, Oral, Daily   • multivitamin with minerals tablet tablet 1 tablet, Oral, Daily, Multi-vitamin along with a Preservision   • Nutritional Supplements (FRUIT & VEGETABLE DAILY PO) 1 capsule, Oral, Daily   • omeprazole (PRILOSEC) 20 mg, Oral, Daily   • simvastatin (ZOCOR) 20 mg, Oral, Nightly         Review of Systems   Constitutional: Negative.    HENT: Negative.    Eyes: Negative.    Respiratory: Negative.    Cardiovascular: Positive for leg swelling.        Leg cramping   Gastrointestinal: Negative.    Endocrine: Negative.    Genitourinary: Negative.    Musculoskeletal: Negative.    Skin: Negative.    Allergic/Immunologic: Negative.    Neurological: Negative.    Hematological: Negative.    Psychiatric/Behavioral: Negative.          /64   Pulse 63   Ht 152.4 cm (60\")   Wt 80.3 kg (177 lb)   SpO2 97%   BMI 34.57 kg/m²   Physical Exam  Vitals and nursing note reviewed.   Constitutional:       General: She is not in acute distress.     Appearance: Normal appearance. She is well-developed. She is not diaphoretic.   HENT:      Head: Normocephalic and atraumatic.   Eyes:      General: No scleral icterus.     Pupils: Pupils are equal, round, and reactive to light.   Neck:      Thyroid: No thyromegaly.      Vascular: No carotid bruit or JVD.   Cardiovascular:      Rate and Rhythm: Normal rate and regular rhythm.      Pulses: Normal pulses.     "  Heart sounds: Normal heart sounds and S2 normal. No murmur heard.    No friction rub. No gallop.   Pulmonary:      Effort: Pulmonary effort is normal.      Breath sounds: Normal breath sounds.   Abdominal:      General: Bowel sounds are normal.      Palpations: Abdomen is soft.   Musculoskeletal:         General: Swelling present. Normal range of motion.      Cervical back: Normal range of motion and neck supple.   Skin:     General: Skin is warm and dry.   Neurological:      Mental Status: She is alert and oriented to person, place, and time.      Cranial Nerves: No cranial nerve deficit.   Psychiatric:         Mood and Affect: Mood normal.         Behavior: Behavior normal.         Thought Content: Thought content normal.         Judgment: Judgment normal.         Diagnostic data:  US Guided Vascular Access     Result Date: 11/14/2022  Narrative: History: Swelling  Comments: Grayscale imaging as well as color flow duplex were used to evaluate the right lower extremity greater saphenous vein during venous closure.  The greater saphenous vein was successfully cannulated just below the knee. The catheter was placed up to the junction and pulled back 3 cm and marked.       Impression: Successful endovenous closure of the right lower extremity greater saphenous vein. This report was finalized on 11/14/2022 15:38 by Dr. Serafin Sher MD.     US Venous Doppler Lower Extremity Right (duplex)     Result Date: 11/21/2022  Narrative: History: Swelling       Impression: Impression: 1. There is no evidence of deep venous thrombosis of the right lower extremity. 2. The greater saphenous vein is closed from zones 2 through 7.  Comments: Right lower extremity venous duplex exam was performed using color Doppler flow, Doppler wave form analysis, and grayscale imaging, with and without compression. There is no evidence of deep venous thrombosis of the common femoral, superficial femoral, popliteal, posterior tibial, and  peroneal veins. There is no thrombus identified in the saphenofemoral junction.  This report was finalized on 11/21/2022 16:04 by Dr. Serafin Sher MD.                US VENOUS DOPPLER LOWER EXTREMITY BILATERAL (DUPLEX)- 7/11/2022 1:26 PM  CDT     HISTORY: bilateral lower extremity edema; R60.0-Localized edema     FINDINGS:  COMPARISON: NONE     FINDINGS:  Transverse and longitudinal grayscale, color Doppler and spectral images  of the bilateral lower extremities were obtained. Venous insufficiency  exam. Additional grayscale, color Doppler, spectral analysis performed  of the bilateral lower extremity deep veins from the distal common  femoral through the proximal anterior posterior tibial and peroneal  veins performed.     There is normal color Doppler flow, compressibility, and augmentation of  the deep veins of the bilateral common femoral, superficial femoral,  deep profundus, popliteal veins. Appropriate color Doppler flow within  the bilateral anterior and posterior tibial as well as peroneal veins.  No evidence of deep vein thrombosis within the bilateral lower  extremities at this time.      RIGHT LOWER EXTREMITY:     GSV-SFJ: 6.9 mm. 1233 ms reflux  GSV -Mid thigh: 3.8 mm. No reflux.  GSV- Above-knee: 3.0 mm. No reflux.  GSV- midcalf: 2.3 mm.  2117 ms reflux  GSV- ankle: 2.1 mm. No reflux.     No venous thrombus identified in the greater saphenous vein.     Suboptimal visualization of the right lower extremity lesser saphenous  vein.     LEFT LOWER EXTREMITY:     GSV-SFJ: 4.4 mm. No reflux.  GSV -Mid thigh: 3.3 mm. 1728 ms reflux  GSV- Above-knee: 3.3 mm. 2717 ms reflux  GSV- midcalf: 1.7 mm. No reflux.  GSV- ankle: 2.3 mm. 1256 ms reflux     No venous thrombus identified in the greater saphenous vein.     LSV-knee: 3.0 mm.  No reflux.  LSV-mid calf: 2.0 mm. 1561 ms reflux  LSV-ankle: 1.6 mm. No reflux.     No venous thrombus identified in the lesser saphenous vein.     IMPRESSION:  1. No evidence of  deep vein thrombosis within the bilateral lower  extremities at this time.  2. There is venous reflux/insufficiency within the bilateral greater  saphenous veins and the left mid lesser saphenous the level of the  midcalf with measurements provided above. Suboptimal visualization the  right lower extremity lesser saphenous vein on today's exam.  This report was finalized on 07/11/2022 17:37 by Dr. Lucia Austin MD.         Patient Active Problem List   Diagnosis   • Essential hypertension   • WILLSON (dyspnea on exertion)   • Mixed hyperlipidemia   • Venous insufficiency   • COVID-19 vaccine series completed   • KATY treated with BiPAP   • Class 1 obesity due to excess calories with serious comorbidity and body mass index (BMI) of 33.0 to 33.9 in adult   • Chronic diastolic congestive heart failure (HCC)   • Chronic venous hypertension with inflammation involving both sides   • Preop testing         Visit Diagnosis       ICD-10-CM ICD-9-CM   1. Chronic venous hypertension with inflammation involving both sides  I87.323 459.32   2. Mixed hyperlipidemia  E78.2 272.2   3. Essential hypertension  I10 401.9                  Plan: After thoroughly evaluating Esha Boogie, I am pleased to report she is doing well status post radiofrequency ablation of the right lower extremity.  She states overall her leg is feeling better.  I did review her testing which shows a greater saphenous vein closed as expected with no evidence of DVT.  She is free to resume normal activity.  She would like to proceed with radiofrequency ablation of the left lower extremity greater saphenous vein.  Risks of radiofrequency ablation include, but are not limited to, bleeding, infection, vessel damage, nerve damage, DVT, phlebitis, and pulmonary embolus.  The patient understands these risks and wishes to proceed with procedure.  I would like her to continue wearing compression stockings on a daily basis and keep her legs elevated when she is not on  them.  I did discuss vascular risk factors as they pertain to the progression of vascular disease including controlling her hypertension and hyperlipidemia.  Her blood pressure stable on her current medications.  She is maintained on Zocor for hyperlipidemia.   The patient can continue taking their current medication regimen as previously planned.  This was all discussed in full with complete understanding.     Thank you for allowing me to participate in the care of your patient.  Please do not hesitate with any questions or concerns.  I will keep you aware of any further encounters with Esha Boogie.           Sincerely yours,           Serafin Sher, DO

## 2023-01-13 NOTE — TELEPHONE ENCOUNTER
Left message as a reminder for surgery on 01/16 with Dr. Sher. Arrival time is 7 AM. NPO after midnight.

## 2023-01-16 ENCOUNTER — APPOINTMENT (OUTPATIENT)
Dept: ULTRASOUND IMAGING | Facility: HOSPITAL | Age: 73
End: 2023-01-16
Payer: MEDICARE

## 2023-01-16 ENCOUNTER — ANESTHESIA (OUTPATIENT)
Dept: PERIOP | Facility: HOSPITAL | Age: 73
End: 2023-01-16
Payer: MEDICARE

## 2023-01-16 ENCOUNTER — HOSPITAL ENCOUNTER (OUTPATIENT)
Facility: HOSPITAL | Age: 73
Setting detail: HOSPITAL OUTPATIENT SURGERY
Discharge: HOME | End: 2023-01-16
Attending: SURGERY | Admitting: SURGERY
Payer: MEDICARE

## 2023-01-16 ENCOUNTER — ANESTHESIA EVENT (OUTPATIENT)
Dept: PERIOP | Facility: HOSPITAL | Age: 73
End: 2023-01-16
Payer: MEDICARE

## 2023-01-16 VITALS
OXYGEN SATURATION: 98 % | HEART RATE: 72 BPM | RESPIRATION RATE: 18 BRPM | TEMPERATURE: 97.2 F | DIASTOLIC BLOOD PRESSURE: 80 MMHG | SYSTOLIC BLOOD PRESSURE: 158 MMHG

## 2023-01-16 DIAGNOSIS — I87.323 CHRONIC VENOUS HYPERTENSION WITH INFLAMMATION INVOLVING BOTH SIDES: ICD-10-CM

## 2023-01-16 LAB — GLUCOSE BLDC GLUCOMTR-MCNC: 108 MG/DL (ref 70–130)

## 2023-01-16 PROCEDURE — C1894 INTRO/SHEATH, NON-LASER: HCPCS | Performed by: SURGERY

## 2023-01-16 PROCEDURE — 36475 ENDOVENOUS RF 1ST VEIN: CPT | Performed by: SURGERY

## 2023-01-16 PROCEDURE — 25010000002 PROPOFOL 1000 MG/100ML EMULSION

## 2023-01-16 PROCEDURE — 76937 US GUIDE VASCULAR ACCESS: CPT

## 2023-01-16 PROCEDURE — 25010000002 CEFAZOLIN PER 500 MG: Performed by: NURSE PRACTITIONER

## 2023-01-16 PROCEDURE — C1888 ENDOVAS NON-CARDIAC ABL CATH: HCPCS | Performed by: SURGERY

## 2023-01-16 PROCEDURE — 82962 GLUCOSE BLOOD TEST: CPT

## 2023-01-16 PROCEDURE — 25010000002 FENTANYL CITRATE (PF) 100 MCG/2ML SOLUTION

## 2023-01-16 RX ORDER — LIDOCAINE HYDROCHLORIDE 20 MG/ML
INJECTION, SOLUTION EPIDURAL; INFILTRATION; INTRACAUDAL; PERINEURAL AS NEEDED
Status: DISCONTINUED | OUTPATIENT
Start: 2023-01-16 | End: 2023-01-16 | Stop reason: SURG

## 2023-01-16 RX ORDER — LIDOCAINE HYDROCHLORIDE 10 MG/ML
0.5 INJECTION, SOLUTION EPIDURAL; INFILTRATION; INTRACAUDAL; PERINEURAL ONCE AS NEEDED
Status: DISCONTINUED | OUTPATIENT
Start: 2023-01-16 | End: 2023-01-16

## 2023-01-16 RX ORDER — LIDOCAINE HYDROCHLORIDE 10 MG/ML
0.5 INJECTION, SOLUTION EPIDURAL; INFILTRATION; INTRACAUDAL; PERINEURAL ONCE AS NEEDED
Status: DISCONTINUED | OUTPATIENT
Start: 2023-01-16 | End: 2023-01-16 | Stop reason: HOSPADM

## 2023-01-16 RX ORDER — SODIUM CHLORIDE 9 MG/ML
INJECTION, SOLUTION INTRAVENOUS AS NEEDED
Status: DISCONTINUED | OUTPATIENT
Start: 2023-01-16 | End: 2023-01-16 | Stop reason: HOSPADM

## 2023-01-16 RX ORDER — DROPERIDOL 2.5 MG/ML
0.62 INJECTION, SOLUTION INTRAMUSCULAR; INTRAVENOUS ONCE AS NEEDED
Status: DISCONTINUED | OUTPATIENT
Start: 2023-01-16 | End: 2023-01-16 | Stop reason: HOSPADM

## 2023-01-16 RX ORDER — NALOXONE HCL 0.4 MG/ML
0.4 VIAL (ML) INJECTION AS NEEDED
Status: DISCONTINUED | OUTPATIENT
Start: 2023-01-16 | End: 2023-01-16 | Stop reason: HOSPADM

## 2023-01-16 RX ORDER — ONDANSETRON 2 MG/ML
4 INJECTION INTRAMUSCULAR; INTRAVENOUS ONCE AS NEEDED
Status: DISCONTINUED | OUTPATIENT
Start: 2023-01-16 | End: 2023-01-16 | Stop reason: SDUPTHER

## 2023-01-16 RX ORDER — SODIUM CHLORIDE 0.9 % (FLUSH) 0.9 %
3-10 SYRINGE (ML) INJECTION AS NEEDED
Status: DISCONTINUED | OUTPATIENT
Start: 2023-01-16 | End: 2023-01-16 | Stop reason: HOSPADM

## 2023-01-16 RX ORDER — HYDROCODONE BITARTRATE AND ACETAMINOPHEN 5; 325 MG/1; MG/1
1 TABLET ORAL EVERY 6 HOURS PRN
Qty: 10 TABLET | Refills: 0 | Status: SHIPPED | OUTPATIENT
Start: 2023-01-16 | End: 2023-03-13

## 2023-01-16 RX ORDER — SODIUM CHLORIDE, SODIUM LACTATE, POTASSIUM CHLORIDE, CALCIUM CHLORIDE 600; 310; 30; 20 MG/100ML; MG/100ML; MG/100ML; MG/100ML
1000 INJECTION, SOLUTION INTRAVENOUS CONTINUOUS
Status: DISCONTINUED | OUTPATIENT
Start: 2023-01-16 | End: 2023-01-16 | Stop reason: HOSPADM

## 2023-01-16 RX ORDER — HYDROCODONE BITARTRATE AND ACETAMINOPHEN 5; 325 MG/1; MG/1
1 TABLET ORAL ONCE AS NEEDED
Status: DISCONTINUED | OUTPATIENT
Start: 2023-01-16 | End: 2023-01-16 | Stop reason: HOSPADM

## 2023-01-16 RX ORDER — SODIUM CHLORIDE 9 MG/ML
40 INJECTION, SOLUTION INTRAVENOUS AS NEEDED
Status: DISCONTINUED | OUTPATIENT
Start: 2023-01-16 | End: 2023-01-16 | Stop reason: HOSPADM

## 2023-01-16 RX ORDER — BUPIVACAINE HCL/0.9 % NACL/PF 0.125 %
PLASTIC BAG, INJECTION (ML) EPIDURAL AS NEEDED
Status: DISCONTINUED | OUTPATIENT
Start: 2023-01-16 | End: 2023-01-16 | Stop reason: SURG

## 2023-01-16 RX ORDER — OXYCODONE AND ACETAMINOPHEN 10; 325 MG/1; MG/1
1 TABLET ORAL ONCE AS NEEDED
Status: DISCONTINUED | OUTPATIENT
Start: 2023-01-16 | End: 2023-01-16 | Stop reason: HOSPADM

## 2023-01-16 RX ORDER — LABETALOL HYDROCHLORIDE 5 MG/ML
5 INJECTION, SOLUTION INTRAVENOUS
Status: DISCONTINUED | OUTPATIENT
Start: 2023-01-16 | End: 2023-01-16 | Stop reason: HOSPADM

## 2023-01-16 RX ORDER — ONDANSETRON 2 MG/ML
4 INJECTION INTRAMUSCULAR; INTRAVENOUS ONCE AS NEEDED
Status: DISCONTINUED | OUTPATIENT
Start: 2023-01-16 | End: 2023-01-16 | Stop reason: HOSPADM

## 2023-01-16 RX ORDER — PROPOFOL 10 MG/ML
INJECTION, EMULSION INTRAVENOUS CONTINUOUS PRN
Status: DISCONTINUED | OUTPATIENT
Start: 2023-01-16 | End: 2023-01-16 | Stop reason: SURG

## 2023-01-16 RX ORDER — FENTANYL CITRATE 50 UG/ML
INJECTION, SOLUTION INTRAMUSCULAR; INTRAVENOUS AS NEEDED
Status: DISCONTINUED | OUTPATIENT
Start: 2023-01-16 | End: 2023-01-16 | Stop reason: SURG

## 2023-01-16 RX ORDER — FENTANYL CITRATE 50 UG/ML
25 INJECTION, SOLUTION INTRAMUSCULAR; INTRAVENOUS
Status: DISCONTINUED | OUTPATIENT
Start: 2023-01-16 | End: 2023-01-16 | Stop reason: HOSPADM

## 2023-01-16 RX ORDER — FLUMAZENIL 0.1 MG/ML
0.2 INJECTION INTRAVENOUS AS NEEDED
Status: DISCONTINUED | OUTPATIENT
Start: 2023-01-16 | End: 2023-01-16 | Stop reason: HOSPADM

## 2023-01-16 RX ORDER — SODIUM CHLORIDE 0.9 % (FLUSH) 0.9 %
3 SYRINGE (ML) INJECTION EVERY 12 HOURS SCHEDULED
Status: DISCONTINUED | OUTPATIENT
Start: 2023-01-16 | End: 2023-01-16 | Stop reason: HOSPADM

## 2023-01-16 RX ORDER — SODIUM CHLORIDE 0.9 % (FLUSH) 0.9 %
3 SYRINGE (ML) INJECTION AS NEEDED
Status: DISCONTINUED | OUTPATIENT
Start: 2023-01-16 | End: 2023-01-16 | Stop reason: HOSPADM

## 2023-01-16 RX ORDER — SODIUM CHLORIDE, SODIUM LACTATE, POTASSIUM CHLORIDE, CALCIUM CHLORIDE 600; 310; 30; 20 MG/100ML; MG/100ML; MG/100ML; MG/100ML
100 INJECTION, SOLUTION INTRAVENOUS CONTINUOUS
Status: DISCONTINUED | OUTPATIENT
Start: 2023-01-16 | End: 2023-01-16 | Stop reason: HOSPADM

## 2023-01-16 RX ADMIN — FENTANYL CITRATE 50 MCG: 50 INJECTION INTRAMUSCULAR; INTRAVENOUS at 09:58

## 2023-01-16 RX ADMIN — PROPOFOL 100 MCG/KG/MIN: 10 INJECTION, EMULSION INTRAVENOUS at 09:56

## 2023-01-16 RX ADMIN — Medication 100 MCG: at 10:03

## 2023-01-16 RX ADMIN — LIDOCAINE HYDROCHLORIDE 50 MG: 20 INJECTION, SOLUTION EPIDURAL; INFILTRATION; INTRACAUDAL; PERINEURAL at 09:55

## 2023-01-16 RX ADMIN — SODIUM CHLORIDE, POTASSIUM CHLORIDE, SODIUM LACTATE AND CALCIUM CHLORIDE 1000 ML: 600; 310; 30; 20 INJECTION, SOLUTION INTRAVENOUS at 08:58

## 2023-01-16 RX ADMIN — Medication 100 MCG: at 10:12

## 2023-01-16 RX ADMIN — FENTANYL CITRATE 50 MCG: 50 INJECTION INTRAMUSCULAR; INTRAVENOUS at 09:54

## 2023-01-16 RX ADMIN — Medication 50 MCG: at 10:18

## 2023-01-16 NOTE — ANESTHESIA POSTPROCEDURE EVALUATION
Patient: Esha Boogie    Procedure Summary     Date: 01/16/23 Room / Location: [facility] OR  / [facility] HYBRID OR 12    Anesthesia Start: 0953 Anesthesia Stop: 1036    Procedure: LEFT SAPHENOUS VEIN RADIO FREQUENCY ABLATION (Left: Leg Lower) Diagnosis:       Chronic venous hypertension with inflammation involving both sides      (Chronic venous hypertension with inflammation involving both sides [I87.323])    Surgeons: Serafin Sher DO Provider: Nikhil Craven CRNA    Anesthesia Type: MAC ASA Status: 3          Anesthesia Type: MAC    Vitals  Vitals Value Taken Time   /58 01/16/23 1036   Temp     Pulse 60 01/16/23 1037   Resp     SpO2 99 % 01/16/23 1037   Vitals shown include unvalidated device data.        Post Anesthesia Care and Evaluation    Patient location during evaluation: PHASE II  Patient participation: complete - patient participated  Level of consciousness: awake and alert  Pain score: 0  Pain management: adequate    Airway patency: patent  Anesthetic complications: No anesthetic complications  PONV Status: none  Cardiovascular status: acceptable and blood pressure returned to baseline  Respiratory status: acceptable  Hydration status: acceptable    Comments: Patient discharged from PACU based on Andree score >8  No anesthesia care post op

## 2023-01-16 NOTE — OP NOTE
Esha Boogie  1/16/2023     PREOPERATIVE DIAGNOSIS: Chronic venous hypertension with inflammation involving both sides [I87.323]     POSTOPERATIVE DIAGNOSIS: Post-Op Diagnosis Codes:     * Chronic venous hypertension with inflammation involving both sides [I87.323]     PROCEDURE PERFORMED:   1.  Ultrasound-guided cannulation of the left lower extremity greater saphenous vein  2.  Radiofrequency ablation of the left lower extremity greater saphenous vein     SURGEON: Serafin Sher DO      ANESTHESIA: MAC    PREPARATION: Routine.    STAFF: Circulator: Shanika Robledo RN  Scrub Person: Devin Norwood    Estimated Blood Loss: minimal    SPECIMENS: None    COMPLICATIONS: None    INDICATIONS: Esha Boogie is a 72 y.o. female whohas been having complaints of lower extremity swelling.  She has been wearing compression stockings over the past few months and reports these are helping with some of her symptoms.  She does continue to have some heaviness, tiredness, aching and cramping to her legs.  She did undergo radiofrequency ablation of the right lower extremity greater saphenous vein on 11/14/2022.  She does report overall her leg feels less heavy tired and she has had no further cramping to her right leg.  She did have noninvasive testing performed today, which I did review in office.  The indications, risks, and possible complications of the procedure were explained to the patient, who voiced understanding and wished to proceed with surgery.     PROCEDURE IN DETAIL:   The patient was taken to the operating room and placed on the operating table in a supine position. After MAC anesthesia was obtained, the left lower extremity was prepped and draped in a sterile manner.  Under ultrasound guidance and using a micropuncture technique the left lower extremity greater saphenous vein was cannulated and a microwire was placed.  This was confirmed under ultrasound.  A small stab incision was made with 11 blade  and a 7 [place] sheath was placed.  The patient was placed in Trendelenburg position and the saphenofemoral junction was identified under ultrasound.  The catheter was placed up to the junction and pulled back 3 cm and marked.  Next, tumescent fluid was instilled along the entire length of the vein under ultrasound guidance.  Once sufficient tumescent fluid was placed radiofrequency ablation had commenced.  There was a total of 7 RF cycles for a total treatment length of 39.5 cm for a total treatment time of 2 minutes and 20 seconds.  There was an average of 9 W at an average temperature of 120°C.  Upon completion of the ablation the catheter and sheath were removed.  Direct pressure was held for an additional 5-10 minutes to ensure hemostasis.  An Ace wrap was placed from the toes to the groin.  Sterile dressings were applied. The patient tolerated the procedure well. Sponge and needle counts were correct. The patient was then awakened and transferred to the outpatient center in stable condition.  Serafin Sher,   Date: 1/16/2023 Time: 09:59 CST     CC:Suman Jeffrey PA

## 2023-01-16 NOTE — ANESTHESIA PREPROCEDURE EVALUATION
Anesthesia Evaluation     Patient summary reviewed   history of anesthetic complications: PONV  NPO Solid Status: > 6 hours             Airway   Mallampati: II  Dental      Pulmonary    (+) sleep apnea (Bipap ) on CPAP,   (-) not a smoker  Cardiovascular   Exercise tolerance: good (4-7 METS)    (+) hypertension, CHF Diastolic >=55%, PVD, hyperlipidemia,   (-) pacemaker, valvular problems/murmurs, past MI, CABG    ROS comment: 7/2022: Left ventricular ejection fraction appears to be 61 - 65%. Left ventricular systolic function is normal.  Left ventricular diastolic function is consistent with (grade II w/high LAP) pseudonormalization.  Mild pulmonary hypertension is present.      Neuro/Psych  (-) seizures, CVA  GI/Hepatic/Renal/Endo    (+) obesity,     (-) no renal disease, diabetes    Musculoskeletal     Abdominal   (+) obese,    Substance History      OB/GYN          Other                          Anesthesia Plan    ASA 3     MAC     intravenous induction     Anesthetic plan, risks, benefits, and alternatives have been provided, discussed and informed consent has been obtained with: patient.        CODE STATUS:

## 2023-01-24 ENCOUNTER — TELEPHONE (OUTPATIENT)
Dept: VASCULAR SURGERY | Facility: CLINIC | Age: 73
End: 2023-01-24
Payer: MEDICARE

## 2023-01-31 ENCOUNTER — TELEPHONE (OUTPATIENT)
Dept: VASCULAR SURGERY | Facility: CLINIC | Age: 73
End: 2023-01-31
Payer: MEDICARE

## 2023-01-31 NOTE — TELEPHONE ENCOUNTER
UNABLE TO CONFIRM TESTING AND APPOINTMENT WITH [facility] ON 2/1/23. LEFT VM ASKING PATIENT TO CALL IF UNABLE TO KEEP APPOINTMENT DUE TO INCLEMENT WEATHER.

## 2023-02-07 ENCOUNTER — TELEPHONE (OUTPATIENT)
Dept: PODIATRY | Facility: CLINIC | Age: 73
End: 2023-02-07
Payer: MEDICARE

## 2023-02-08 ENCOUNTER — OFFICE VISIT (OUTPATIENT)
Dept: VASCULAR SURGERY | Facility: CLINIC | Age: 73
End: 2023-02-08
Payer: MEDICARE

## 2023-02-08 ENCOUNTER — HOSPITAL ENCOUNTER (OUTPATIENT)
Dept: ULTRASOUND IMAGING | Facility: HOSPITAL | Age: 73
Discharge: HOME | End: 2023-02-08
Admitting: SURGERY
Payer: MEDICARE

## 2023-02-08 VITALS
DIASTOLIC BLOOD PRESSURE: 72 MMHG | WEIGHT: 176 LBS | HEART RATE: 66 BPM | BODY MASS INDEX: 35.01 KG/M2 | OXYGEN SATURATION: 97 % | SYSTOLIC BLOOD PRESSURE: 118 MMHG

## 2023-02-08 DIAGNOSIS — E78.2 MIXED HYPERLIPIDEMIA: ICD-10-CM

## 2023-02-08 DIAGNOSIS — I87.323 CHRONIC VENOUS HYPERTENSION WITH INFLAMMATION INVOLVING BOTH SIDES: Primary | ICD-10-CM

## 2023-02-08 DIAGNOSIS — I10 ESSENTIAL HYPERTENSION: ICD-10-CM

## 2023-02-08 DIAGNOSIS — I87.323 CHRONIC VENOUS HYPERTENSION WITH INFLAMMATION INVOLVING BOTH SIDES: ICD-10-CM

## 2023-02-08 PROCEDURE — 99212 OFFICE O/P EST SF 10 MIN: CPT | Performed by: NURSE PRACTITIONER

## 2023-02-08 PROCEDURE — 93971 EXTREMITY STUDY: CPT | Performed by: SURGERY

## 2023-02-08 PROCEDURE — 93971 EXTREMITY STUDY: CPT

## 2023-02-12 NOTE — PROGRESS NOTES
02/08/2023       Suman Jefrfey PA  [address] [place] [address] [place] [address]    Esha Boogie  1950    Chief Complaint   Patient presents with   • POST-OP     1wk po left RFA with testing.  Pt states that she has healed okay.  Pt had surgery on 01/16/23.  Pt c/o not having steri-strips on the incision on her leg.         Dear Suman Jeffrey, PA       HPI  I had the pleasure of seeing your patient Esha Boogie in the office today.    As you recall, Esha Boogie is a 72 y.o.  female who you are currently following for routine health maintenance.  She has been having complaints of lower extremity swelling.  She has been wearing compression stockings over the past few months and reports these are helping with some of her symptoms.  She does continue to have some heaviness, tiredness, aching and cramping to her legs.  She did undergo radiofrequency ablation of the right lower extremity greater saphenous vein on 11/14/2022 and most recently the left lower extremity on 1/16/2023.  She is healing okay overall.  She was concerned that she did not have a Steri-Strip over the incision on her left leg.  She has not had any further discomfort.  She did have noninvasive testing performed today, which I did review in office.    Review of Systems   Constitutional: Negative.    HENT: Negative.    Eyes: Negative.    Respiratory: Negative.    Cardiovascular: Positive for leg swelling.   Gastrointestinal: Negative.    Endocrine: Negative.    Genitourinary: Negative.    Musculoskeletal: Negative.    Skin: Negative.    Allergic/Immunologic: Negative.    Neurological: Negative.    Hematological: Negative.    Psychiatric/Behavioral: Negative.         /72   Pulse 66   Wt 79.8 kg (176 lb)   SpO2 97%   BMI 35.01 kg/m²   Physical Exam  Vitals and nursing note reviewed.   Constitutional:       General: She is not in acute distress.     Appearance: Normal appearance. She is well-developed. She is not diaphoretic.   HENT:       Head: Normocephalic and atraumatic.   Eyes:      General: No scleral icterus.     Pupils: Pupils are equal, round, and reactive to light.   Neck:      Thyroid: No thyromegaly.      Vascular: No carotid bruit or JVD.   Cardiovascular:      Rate and Rhythm: Normal rate and regular rhythm.      Pulses: Normal pulses.      Heart sounds: Normal heart sounds and S2 normal. No murmur heard.    No friction rub. No gallop.      Comments: Puncture site healed  Pulmonary:      Effort: Pulmonary effort is normal.      Breath sounds: Normal breath sounds.   Abdominal:      General: Bowel sounds are normal.      Palpations: Abdomen is soft.   Musculoskeletal:         General: Swelling present. Normal range of motion.      Cervical back: Normal range of motion and neck supple.   Skin:     General: Skin is warm and dry.   Neurological:      Mental Status: She is alert and oriented to person, place, and time.      Cranial Nerves: No cranial nerve deficit.   Psychiatric:         Mood and Affect: Mood normal.         Behavior: Behavior normal.         Thought Content: Thought content normal.         Judgment: Judgment normal.        Diagnostic data:  US Guided Vascular Access    Result Date: 1/16/2023  Narrative: History: Swelling  Comments: Grayscale imaging as well as color flow duplex were used to evaluate the left lower extremity greater saphenous vein during venous closure.  The greater saphenous vein was successfully cannulated just below the knee. The catheter was placed up to the junction and pulled back 3 cm and marked.      Impression: Successful endovenous closure of the left lower extremity greater saphenous vein. This report was finalized on 01/16/2023 15:08 by Dr. Serafin Sher MD.    US Venous Doppler Lower Extremity Left (duplex)    Result Date: 2/8/2023  Narrative: History: Left lower extremity swelling. Status post left greater saphenous vein radiofrequency ablation on 01/16/2023      Impression: Impression:  There is no evidence of deep venous thrombosis of the left lower extremity. The left greater saphenous vein appears successfully closed 2.6 cm distal to the saphenofemoral junction through the distal calf.  Comments: Left lower extremity venous duplex exam was performed using color Doppler flow, Doppler wave form analysis, and grayscale imaging, with and without compression. There is no evidence of deep venous thrombosis of the common femoral, superficial femoral, popliteal, posterior tibial, and peroneal veins. The left greater saphenous vein appears successfully closed 2.6 cm distal to the saphenofemoral junction through the distal calf.  This report was finalized on 02/08/2023 16:41 by Dr. Janusz Verduzco MD.         Patient Active Problem List   Diagnosis   • Essential hypertension   • WILLSON (dyspnea on exertion)   • Mixed hyperlipidemia   • Venous insufficiency   • COVID-19 vaccine series completed   • KATY treated with BiPAP   • Class 1 obesity due to excess calories with serious comorbidity and body mass index (BMI) of 34.0 to 34.9 in adult   • Chronic diastolic congestive heart failure (HCC)   • Chronic venous hypertension with inflammation involving both sides   • Preop testing         ICD-10-CM ICD-9-CM   1. Chronic venous hypertension with inflammation involving both sides  I87.323 459.32   2. Essential hypertension  I10 401.9   3. Mixed hyperlipidemia  E78.2 272.2           Plan: After thoroughly evaluating Esha Boogie, I am pleased to report she is doing well status post radiofrequency ablation of the left lower extremity.  She does report overall her leg is feeling better.  She did have concerns that there is no Steri-Strip placed on her left leg however she has healed without difficulty.  I did review her testing which shows no evidence of DVT and the greater saphenous vein closed as expected post RFA.  We will see her back in 6 months for continued surveillance.   I would like her to continue  wearing compression stockings on a daily basis and keep her legs elevated when she is not on them.  I did discuss vascular risk factors as they pertain to the progression of vascular disease including controlling her hypertension and hyperlipidemia. These risk factors are currently stable.  The patient can continue taking their current medication regimen as previously planned.  This was all discussed in full with complete understanding.    Thank you for allowing me to participate in the care of your patient.  Please do not hesitate with any questions or concerns.  I will keep you aware of any further encounters with Esha Boogie.        Sincerely yours,         CASSIDY Loredo

## 2023-03-09 NOTE — PROGRESS NOTES
Chief Complaint  Hypertension (3mo F/U.) and Congestive Heart Failure     Subjective          Eshajon Boogie presents to [id number] for routine follow-up. She has chronic diastolic congestive heart failure, hypertension, hyperlipidemia, obstructive sleep apnea, GERD, venous insufficiency and obesity. She complains of chronic bilateral lower extremity edema, however she states this has improved since starting spironolactone and Farxiga. She reports occasional palpitations. Patient denies chest pain, shortness of breath, dizziness, syncope, orthopnea, PND or decreased stamina.  Patient denies any signs of bleeding.    Hypertension  This is a chronic problem. The current episode started more than 1 year ago. The problem is uncontrolled. Associated symptoms include palpitations and peripheral edema. Pertinent negatives include no anxiety, blurred vision, chest pain, headaches, malaise/fatigue, neck pain, orthopnea, PND, shortness of breath or sweats. Risk factors for coronary artery disease include obesity, dyslipidemia and post-menopausal state. Current antihypertension treatment includes central alpha agonists, ACE inhibitors, beta blockers and diuretics. The current treatment provides significant improvement. Hypertensive end-organ damage includes heart failure. Identifiable causes of hypertension include sleep apnea.   Hyperlipidemia  This is a chronic problem. The current episode started more than 1 year ago. The problem is controlled. Exacerbating diseases include obesity. Pertinent negatives include no chest pain or shortness of breath. Current antihyperlipidemic treatment includes statins. Risk factors for coronary artery disease include hypertension, obesity, post-menopausal and dyslipidemia.   Congestive Heart Failure  Presents for follow-up visit. Associated symptoms include edema and palpitations. Pertinent negatives include no abdominal pain, chest pain, chest  pressure, claudication, fatigue, muscle weakness, near-syncope, nocturia, orthopnea, paroxysmal nocturnal dyspnea, shortness of breath or unexpected weight change. The symptoms have been stable. Compliance with total regimen is 51-75%. Compliance with diet is 51-75%. Compliance with exercise is 51-75%. Compliance with medications is %.     I have reviewed and confirmed the accuracy of the ROS  CASSIDY Rodriguez      Objective     Current Outpatient Medications:   •  amLODIPine (NORVASC) 2.5 MG tablet, Take 1 tablet by mouth Daily., Disp: 30 tablet, Rfl: 11  •  cloNIDine (Catapres) 0.1 MG tablet, Take 1 tablet by mouth 3 (Three) Times a Day. (Patient taking differently: Take 1 tablet by mouth 3 (Three) Times a Day As Needed for High Blood Pressure.), Disp: 270 tablet, Rfl: 3  •  dapagliflozin Propanediol 10 MG tablet, Take 10 mg by mouth Daily., Disp: 30 tablet, Rfl: 11  •  furosemide (LASIX) 40 MG tablet, Take 1 tablet by mouth Daily As Needed (daily as needed for increased shortness of breath, swelling and/or weight gain)., Disp: 90 tablet, Rfl: 3  •  hydroCHLOROthiazide (HYDRODIURIL) 50 MG tablet, Take 1 tablet by mouth Daily., Disp: 90 tablet, Rfl: 3  •  lisinopril (PRINIVIL,ZESTRIL) 40 MG tablet, Take 1 tablet by mouth Daily., Disp: 90 tablet, Rfl: 3  •  metoprolol succinate XL (TOPROL-XL) 100 MG 24 hr tablet, Take 1 tablet by mouth Daily., Disp: , Rfl:   •  multivitamin with minerals tablet tablet, Take 1 tablet by mouth Daily. Multi-vitamin along with a Preservision, Disp: , Rfl:   •  Nutritional Supplements (FRUIT & VEGETABLE DAILY PO), Take 1 capsule by mouth Daily., Disp: , Rfl:   •  omeprazole (priLOSEC) 20 MG capsule, Take 1 capsule by mouth Daily., Disp: , Rfl:   •  simvastatin (ZOCOR) 20 MG tablet, Take 1 tablet by mouth Every Night., Disp: , Rfl:   •  sulfamethoxazole-trimethoprim (BACTRIM DS,SEPTRA DS) 800-160 MG per tablet, Take 1 tablet by mouth 2 (Two) Times a Day., Disp: , Rfl:   •   "spironolactone (ALDACTONE) 25 MG tablet, Take 1 tablet by mouth Daily., Disp: , Rfl:   Vital Signs:   /80   Pulse 62   Ht 152.4 cm (60\")   Wt 82.6 kg (182 lb)   SpO2 98%   BMI 35.54 kg/m²     Vitals and nursing note reviewed.   Constitutional:       General: Not in acute distress.     Appearance: Normal and healthy appearance. Well-developed and not in distress. Obese. Not diaphoretic.   Eyes:      General: Lids are normal.         Right eye: No discharge.         Left eye: No discharge.      Conjunctiva/sclera: Conjunctivae normal.      Pupils: Pupils are equal, round, and reactive to light.   HENT:      Head: Normocephalic and atraumatic.      Jaw: There is normal jaw occlusion.      Right Ear: External ear normal.      Left Ear: External ear normal.      Nose: Nose normal.   Neck:      Thyroid: No thyromegaly.      Vascular: No carotid bruit, JVD or JVR. JVD normal.      Trachea: Trachea normal. No tracheal deviation.   Pulmonary:      Effort: Pulmonary effort is normal. No respiratory distress.      Breath sounds: Normal breath sounds. No decreased breath sounds. No wheezing. No rhonchi. No rales.   Chest:      Chest wall: Not tender to palpatation.   Cardiovascular:      PMI at left midclavicular line. Bradycardia present. Regular rhythm. Normal S1. Normal S2.      Murmurs: There is no murmur.      No gallop. No click. No rub.   Pulses:     Intact distal pulses. No decreased pulses.   Edema:     Peripheral edema present.     Pretibial: bilateral trace edema of the pretibial area.     Ankle: bilateral trace edema of the ankle.     Feet: bilateral trace edema of the feet.  Abdominal:      General: Bowel sounds are normal. There is no distension.      Palpations: Abdomen is soft.      Tenderness: There is no abdominal tenderness.   Musculoskeletal: Normal range of motion.         General: No tenderness or deformity.      Cervical back: Normal range of motion and neck supple. Skin:     General: Skin is " warm and dry.      Coloration: Skin is not pale.      Findings: No erythema or rash.   Neurological:      General: No focal deficit present.      Mental Status: Alert, oriented to person, place, and time and oriented to person, place and time.   Psychiatric:         Attention and Perception: Attention and perception normal.         Mood and Affect: Mood and affect normal.         Speech: Speech normal.         Behavior: Behavior normal.         Thought Content: Thought content normal.         Cognition and Memory: Cognition and memory normal.         Judgment: Judgment normal.        Result Review :   The following data was reviewed by: CASSIDY Rodriguez on 3/13/2023:  Common labs    Common Labs 11/8/22 11/8/22 1/9/23 1/9/23    1317 1317 1037 1037   Glucose  161 (A)  78   BUN  24 (A)  17   Creatinine  0.77  0.52 (A)   Sodium  137  139   Potassium  4.2  4.2   Chloride  103  102   Calcium  9.5  9.5   WBC 10.58  11.57 (A)    Hemoglobin 13.5  14.8    Hematocrit 41.0  46.1    Platelets 297  259    (A) Abnormal value          Radiology exam- bilateral lower extremity venous ultrasound 7/11/22 and cardiology exam- 2d echo 9/13/22         Assessment and Plan    Diagnoses and all orders for this visit:    1. Essential hypertension (Primary)-blood pressure is borderline in office today. Pt reports consistently lower than 130/80 at home. Continue spironolactone, HCTZ, lisinopril, amlodipine, clonidine and metoprolol succinate.  Monitor and record daily blood pressure. Report readings consistently higher than 130/80 or consistently lower than 100/60.     2. Mixed hyperlipidemia-management per PCP.  Continue simvastatin.    3. KATY treated with BiPAP-patient reports compliance with BiPAP.  Stable.    4. Class 2 severe obesity due to excess calories with serious comorbidity and body mass index (BMI) of 35.0 to 35.9 in adult (HCC)- Class 2 Severe Obesity (BMI >=35 and <=39.9). Obesity-related health conditions include the  following: obstructive sleep apnea, hypertension and dyslipidemias. Obesity is unchanged. BMI is is above average; no BMI management plan is appropriate. We discussed low calorie, low carb based diet program, portion control and increasing exercise.    5.  Venous insufficiency- present on bilateral lower extremity venous ultrasound 7/11/2022.  Pt is following with Dr. Serafin Sher with vascular surgery.  Status post right lower extremity venous ablation on 11/14/2022. Left lower extremity planned for 1/16/2023.     6. Chronic diastolic congestive heart failure (HCC)- NYHA class II. Compensated.  Patient was unable to tolerate Entresto due to reported skin irritation and pruritus. Reviewed signs and symptoms of CHF and what to report with the patient. Patient instructed to restrict sodium and weigh daily. Report weight gain of greater than 2 lbs overnight or 5 lbs in 1 week. Pt verbalized understanding of instructions and plan of care.  Continue spironolactone, Farxiga, lisinopril and metoprolol succinate.       Follow Up   Return in about 3 months (around 6/13/2023) for Next scheduled follow up.  Patient was given instructions and counseling regarding her condition or for health maintenance advice. Please see specific information pulled into the AVS if appropriate.

## 2023-03-13 ENCOUNTER — OFFICE VISIT (OUTPATIENT)
Dept: CARDIOLOGY | Facility: CLINIC | Age: 73
End: 2023-03-13
Payer: MEDICARE

## 2023-03-13 VITALS
OXYGEN SATURATION: 98 % | SYSTOLIC BLOOD PRESSURE: 128 MMHG | DIASTOLIC BLOOD PRESSURE: 80 MMHG | BODY MASS INDEX: 35.73 KG/M2 | WEIGHT: 182 LBS | HEART RATE: 62 BPM | HEIGHT: 60 IN

## 2023-03-13 DIAGNOSIS — E78.2 MIXED HYPERLIPIDEMIA: ICD-10-CM

## 2023-03-13 DIAGNOSIS — I10 ESSENTIAL HYPERTENSION: Primary | ICD-10-CM

## 2023-03-13 DIAGNOSIS — G47.33 OSA TREATED WITH BIPAP: ICD-10-CM

## 2023-03-13 DIAGNOSIS — I87.2 VENOUS INSUFFICIENCY: ICD-10-CM

## 2023-03-13 DIAGNOSIS — E66.01 CLASS 2 SEVERE OBESITY DUE TO EXCESS CALORIES WITH SERIOUS COMORBIDITY AND BODY MASS INDEX (BMI) OF 35.0 TO 35.9 IN ADULT: ICD-10-CM

## 2023-03-13 DIAGNOSIS — I50.32 CHRONIC DIASTOLIC CONGESTIVE HEART FAILURE: ICD-10-CM

## 2023-03-13 PROCEDURE — 3074F SYST BP LT 130 MM HG: CPT | Performed by: NURSE PRACTITIONER

## 2023-03-13 PROCEDURE — 1159F MED LIST DOCD IN RCRD: CPT | Performed by: NURSE PRACTITIONER

## 2023-03-13 PROCEDURE — 3079F DIAST BP 80-89 MM HG: CPT | Performed by: NURSE PRACTITIONER

## 2023-03-13 PROCEDURE — 1160F RVW MEDS BY RX/DR IN RCRD: CPT | Performed by: NURSE PRACTITIONER

## 2023-03-13 PROCEDURE — 99214 OFFICE O/P EST MOD 30 MIN: CPT | Performed by: NURSE PRACTITIONER

## 2023-03-13 RX ORDER — SPIRONOLACTONE 25 MG/1
25 TABLET ORAL DAILY
COMMUNITY

## 2023-03-13 RX ORDER — SULFAMETHOXAZOLE AND TRIMETHOPRIM 800; 160 MG/1; MG/1
1 TABLET ORAL 2 TIMES DAILY
COMMUNITY
Start: 2023-03-09

## 2023-08-10 ENCOUNTER — TELEPHONE (OUTPATIENT)
Dept: VASCULAR SURGERY | Facility: CLINIC | Age: 73
End: 2023-08-10
Payer: MEDICARE

## 2023-08-11 ENCOUNTER — OFFICE VISIT (OUTPATIENT)
Dept: VASCULAR SURGERY | Facility: CLINIC | Age: 73
End: 2023-08-11
Payer: MEDICARE

## 2023-08-11 VITALS
OXYGEN SATURATION: 98 % | SYSTOLIC BLOOD PRESSURE: 118 MMHG | DIASTOLIC BLOOD PRESSURE: 78 MMHG | WEIGHT: 178 LBS | HEART RATE: 78 BPM | BODY MASS INDEX: 37.36 KG/M2 | HEIGHT: 58 IN

## 2023-08-11 DIAGNOSIS — I87.323 CHRONIC VENOUS HYPERTENSION WITH INFLAMMATION INVOLVING BOTH SIDES: Primary | ICD-10-CM

## 2023-08-11 DIAGNOSIS — I10 ESSENTIAL HYPERTENSION: ICD-10-CM

## 2023-08-11 DIAGNOSIS — E78.2 MIXED HYPERLIPIDEMIA: ICD-10-CM

## 2023-08-11 DIAGNOSIS — L84 CALLUS OF FOOT: ICD-10-CM

## 2023-08-11 DIAGNOSIS — I89.0 LYMPHEDEMA: ICD-10-CM

## 2023-08-11 PROCEDURE — 3074F SYST BP LT 130 MM HG: CPT | Performed by: NURSE PRACTITIONER

## 2023-08-11 PROCEDURE — 99213 OFFICE O/P EST LOW 20 MIN: CPT | Performed by: NURSE PRACTITIONER

## 2023-08-11 PROCEDURE — 1159F MED LIST DOCD IN RCRD: CPT | Performed by: NURSE PRACTITIONER

## 2023-08-11 PROCEDURE — 3078F DIAST BP <80 MM HG: CPT | Performed by: NURSE PRACTITIONER

## 2023-08-11 PROCEDURE — 1160F RVW MEDS BY RX/DR IN RCRD: CPT | Performed by: NURSE PRACTITIONER

## 2023-08-11 NOTE — PROGRESS NOTES
"08/11/2023       Charly Dominguez, CASSIDY  [address] [facility] [id number]    Esha Boogie  1950    Chief Complaint   Patient presents with    Follow-up     6 month follow up. Pt was last seen on 2.8.23 for chronic venous hypertension. Pt states that she is having numbness in right leg, and also in right arm for past few months.        Dear Charly Dominguez, CASSIDY       HPI  I had the pleasure of seeing your patient Esha Boogie in the office today.    As you recall, Esha Boogie is a 73 y.o.  female who you are currently following for routine health maintenance.  She has been having complaints of lower extremity swelling.  She has been wearing compression stockings over the past few months and reports these are helping with some of her symptoms.  She does continue to have some heaviness, tiredness, aching and cramping to her legs.  She did undergo radiofrequency ablation of the right lower extremity greater saphenous vein on 11/14/2022 and the left lower extremity on 1/16/2023.  Overall she is doing well in regards to her venous insufficiency.  She has complaints of numbness in the right leg which she did have at last visit and also in the right arm over the past few months.    She does have evidence of arthrosis in her joints.    Review of Systems   Constitutional: Negative.    HENT: Negative.     Eyes: Negative.    Respiratory: Negative.     Cardiovascular:  Positive for leg swelling.   Gastrointestinal: Negative.    Endocrine: Negative.    Genitourinary: Negative.    Musculoskeletal: Negative.    Skin: Negative.    Allergic/Immunologic: Negative.    Neurological:  Positive for numbness (right arm and leg for past 3 months).   Hematological: Negative.    Psychiatric/Behavioral: Negative.        /78   Pulse 78   Ht 147.3 cm (58\")   Wt 80.7 kg (178 lb)   SpO2 98%   BMI 37.20 kg/mý   Physical Exam  Vitals and nursing note reviewed.   Constitutional:       General: She is not " in acute distress.     Appearance: Normal appearance. She is well-developed. She is obese. She is not diaphoretic.   HENT:      Head: Normocephalic and atraumatic.   Eyes:      General: No scleral icterus.     Pupils: Pupils are equal, round, and reactive to light.   Neck:      Thyroid: No thyromegaly.      Vascular: No carotid bruit or JVD.   Cardiovascular:      Rate and Rhythm: Normal rate and regular rhythm.      Pulses: Normal pulses.      Heart sounds: Normal heart sounds and S2 normal. No murmur heard.    No friction rub. No gallop.   Pulmonary:      Effort: Pulmonary effort is normal.      Breath sounds: Normal breath sounds.   Abdominal:      General: Bowel sounds are normal.      Palpations: Abdomen is soft.   Musculoskeletal:         General: Swelling present. Normal range of motion.      Cervical back: Normal range of motion and neck supple.   Skin:     General: Skin is warm and dry.   Neurological:      Mental Status: She is alert and oriented to person, place, and time.      Cranial Nerves: No cranial nerve deficit.   Psychiatric:         Mood and Affect: Mood normal.         Behavior: Behavior normal.         Thought Content: Thought content normal.         Judgment: Judgment normal.         Diagnostic data:  No results found.         Patient Active Problem List   Diagnosis    Essential hypertension    WILLSON (dyspnea on exertion)    Mixed hyperlipidemia    Venous insufficiency    COVID-19 vaccine series completed    KATY treated with BiPAP    Class 2 severe obesity due to excess calories with serious comorbidity and body mass index (BMI) of 35.0 to 35.9 in adult    Chronic diastolic congestive heart failure    Chronic venous hypertension with inflammation involving both sides    Preop testing         ICD-10-CM ICD-9-CM   1. Chronic venous hypertension with inflammation involving both sides  I87.323 459.32   2. Lymphedema  I89.0 457.1   3. Callus of foot  L84 700   4. Essential hypertension  I10 401.9    5. Mixed hyperlipidemia  E78.2 272.2           Plan: After thoroughly evaluating Esha Boogie, I believe the best course of action is to remain conservative from vascular surgery standpoint.  Currently she is doing well in regards to her venous insufficiency.  Her swelling has improved.  I would recommend she continue with compression stocking and elevation of her legs when she is not on them.  She will further discuss the numbness she has had over the past few months with her primary care provider.  We will see her back in 1 year with repeat noninvasive testing for continued surveillance including screening carotid duplex.  I did discuss vascular risk factors as they pertain to the progression of vascular disease including controlling her hypertension and hyperlipidemia.  Her blood pressure stable on her current medication.  The patient can continue taking their current medication regimen as previously planned.  This was all discussed in full with complete understanding.    Thank you for allowing me to participate in the care of your patient.  Please do not hesitate with any questions or concerns.  I will keep you aware of any further encounters with Ehsa Boogie.        Sincerely yours,         CASSIDY Loredo

## 2023-08-11 NOTE — LETTER
August 20, 2023       No Recipients    Patient: Esha Boogie   YOB: 1950   Date of Visit: 8/11/2023     Dear CASSIDY Cole:       Thank you for referring Esha Boogie to me for evaluation. Below are the relevant portions of my assessment and plan of care.    If you have questions, please do not hesitate to call me. I look forward to following Esha along with you.         Sincerely,        CASSIDY Loredo        CC:   No Recipients    Viktoria Rizzo APRN  08/20/23 1813  Sign when Signing Visit  08/11/2023       Charly Dominguez APRN  [address] [facility] [address]    Esha Boogie  1950    Chief Complaint   Patient presents with    Follow-up     6 month follow up. Pt was last seen on 2.8.23 for chronic venous hypertension. Pt states that she is having numbness in right leg, and also in right arm for past few months.        Dear Charly Dominguez APRN       HPI  I had the pleasure of seeing your patient Esha Boogie in the office today.    As you recall, Esha Boogie is a 73 y.o.  female who you are currently following for routine health maintenance.  She has been having complaints of lower extremity swelling.  She has been wearing compression stockings over the past few months and reports these are helping with some of her symptoms.  She does continue to have some heaviness, tiredness, aching and cramping to her legs.  She did undergo radiofrequency ablation of the right lower extremity greater saphenous vein on 11/14/2022 and the left lower extremity on 1/16/2023.  Overall she is doing well in regards to her venous insufficiency.  She has complaints of numbness in the right leg which she did have at last visit and also in the right arm over the past few months.    She does have evidence of arthrosis in her joints.    Review of Systems   Constitutional: Negative.    HENT: Negative.     Eyes: Negative.    Respiratory: Negative.    "  Cardiovascular:  Positive for leg swelling.   Gastrointestinal: Negative.    Endocrine: Negative.    Genitourinary: Negative.    Musculoskeletal: Negative.    Skin: Negative.    Allergic/Immunologic: Negative.    Neurological:  Positive for numbness (right arm and leg for past 3 months).   Hematological: Negative.    Psychiatric/Behavioral: Negative.        /78   Pulse 78   Ht 147.3 cm (58\")   Wt 80.7 kg (178 lb)   SpO2 98%   BMI 37.20 kg/mý   Physical Exam  Vitals and nursing note reviewed.   Constitutional:       General: She is not in acute distress.     Appearance: Normal appearance. She is well-developed. She is obese. She is not diaphoretic.   HENT:      Head: Normocephalic and atraumatic.   Eyes:      General: No scleral icterus.     Pupils: Pupils are equal, round, and reactive to light.   Neck:      Thyroid: No thyromegaly.      Vascular: No carotid bruit or JVD.   Cardiovascular:      Rate and Rhythm: Normal rate and regular rhythm.      Pulses: Normal pulses.      Heart sounds: Normal heart sounds and S2 normal. No murmur heard.    No friction rub. No gallop.   Pulmonary:      Effort: Pulmonary effort is normal.      Breath sounds: Normal breath sounds.   Abdominal:      General: Bowel sounds are normal.      Palpations: Abdomen is soft.   Musculoskeletal:         General: Swelling present. Normal range of motion.      Cervical back: Normal range of motion and neck supple.   Skin:     General: Skin is warm and dry.   Neurological:      Mental Status: She is alert and oriented to person, place, and time.      Cranial Nerves: No cranial nerve deficit.   Psychiatric:         Mood and Affect: Mood normal.         Behavior: Behavior normal.         Thought Content: Thought content normal.         Judgment: Judgment normal.         Diagnostic data:  No results found.         Patient Active Problem List   Diagnosis    Essential hypertension    WILLSON (dyspnea on exertion)    Mixed hyperlipidemia    " Venous insufficiency    COVID-19 vaccine series completed    KATY treated with BiPAP    Class 2 severe obesity due to excess calories with serious comorbidity and body mass index (BMI) of 35.0 to 35.9 in adult    Chronic diastolic congestive heart failure    Chronic venous hypertension with inflammation involving both sides    Preop testing         ICD-10-CM ICD-9-CM   1. Chronic venous hypertension with inflammation involving both sides  I87.323 459.32   2. Lymphedema  I89.0 457.1   3. Callus of foot  L84 700   4. Essential hypertension  I10 401.9   5. Mixed hyperlipidemia  E78.2 272.2           Plan: After thoroughly evaluating Esha Boogie, I believe the best course of action is to remain conservative from vascular surgery standpoint.  Currently she is doing well in regards to her venous insufficiency.  Her swelling has improved.  I would recommend she continue with compression stocking and elevation of her legs when she is not on them.  She will further discuss the numbness she has had over the past few months with her primary care provider.  We will see her back in 1 year with repeat noninvasive testing for continued surveillance including screening carotid duplex.  I did discuss vascular risk factors as they pertain to the progression of vascular disease including controlling her hypertension and hyperlipidemia.  Her blood pressure stable on her current medication.  The patient can continue taking their current medication regimen as previously planned.  This was all discussed in full with complete understanding.    Thank you for allowing me to participate in the care of your patient.  Please do not hesitate with any questions or concerns.  I will keep you aware of any further encounters with Esha Boogie.        Sincerely yours,         CASSIDY Loredo

## 2023-08-21 RX ORDER — LISINOPRIL 40 MG/1
40 TABLET ORAL DAILY
Qty: 90 TABLET | Refills: 3 | Status: SHIPPED | OUTPATIENT
Start: 2023-08-21

## 2023-08-21 RX ORDER — SPIRONOLACTONE 25 MG/1
TABLET ORAL
Qty: 90 TABLET | Refills: 0 | OUTPATIENT
Start: 2023-08-21

## 2023-08-21 RX ORDER — LISINOPRIL 40 MG/1
TABLET ORAL
Qty: 90 TABLET | Refills: 0 | OUTPATIENT
Start: 2023-08-21

## 2023-08-30 NOTE — PROGRESS NOTES
Chief Complaint  Congestive Heart Failure (2mo F/U. Increased Aldactone LOV) and Hypertension     Subjective          Eshajon Boogie presents to [facility] for routine follow-up of medication adjustment.  Amlodipine was discontinued and spironolactone was increased to 50 mg daily at her last office visit on 6/27/2023.  Follow-up BMP has not been completed. She has chronic diastolic congestive heart failure, hypertension, hyperlipidemia, obstructive sleep apnea, GERD, venous insufficiency and obesity. She complains of chronic bilateral lower extremity edema that has worsened over the last several months. She reports occasional palpitations. Patient denies chest pain, shortness of breath, dizziness, syncope, orthopnea, PND or decreased stamina.  Patient denies any signs of bleeding.    Hypertension  This is a chronic problem. The current episode started more than 1 year ago. The problem is controlled. Associated symptoms include palpitations and peripheral edema. Pertinent negatives include no anxiety, blurred vision, chest pain, headaches, malaise/fatigue, neck pain, orthopnea, PND, shortness of breath or sweats. Risk factors for coronary artery disease include obesity, dyslipidemia and post-menopausal state. Current antihypertension treatment includes central alpha agonists, ACE inhibitors, beta blockers and diuretics. The current treatment provides significant improvement. Hypertensive end-organ damage includes heart failure. Identifiable causes of hypertension include sleep apnea.   Hyperlipidemia  This is a chronic problem. The current episode started more than 1 year ago. The problem is controlled. Exacerbating diseases include obesity. Pertinent negatives include no chest pain or shortness of breath. Current antihyperlipidemic treatment includes statins. Risk factors for coronary artery disease include hypertension, obesity, post-menopausal and dyslipidemia.   Congestive Heart  "Failure  Presents for follow-up visit. Associated symptoms include edema and palpitations. Pertinent negatives include no abdominal pain, chest pain, chest pressure, claudication, fatigue, muscle weakness, near-syncope, nocturia, orthopnea, paroxysmal nocturnal dyspnea, shortness of breath or unexpected weight change. The symptoms have been stable. Compliance with total regimen is 51-75%. Compliance with diet is 51-75%. Compliance with exercise is 51-75%. Compliance with medications is %.     I have reviewed and confirmed the accuracy of the ROS CASSIDY Rodriguez    Objective     Current Outpatient Medications:     dapagliflozin Propanediol 10 MG tablet, Take 10 mg by mouth Daily., Disp: 30 tablet, Rfl: 11    furosemide (LASIX) 40 MG tablet, Take 1 tablet by mouth Daily As Needed (daily as needed for increased shortness of breath, swelling and/or weight gain)., Disp: 90 tablet, Rfl: 3    hydroCHLOROthiazide (HYDRODIURIL) 50 MG tablet, TAKE ONE TABLET BY MOUTH ONCE EVERY DAY, Disp: 90 tablet, Rfl: 3    lisinopril (PRINIVIL,ZESTRIL) 40 MG tablet, Take 1 tablet by mouth Daily., Disp: 90 tablet, Rfl: 3    metoprolol succinate XL (TOPROL-XL) 100 MG 24 hr tablet, Take 1 tablet by mouth Daily., Disp: , Rfl:     omeprazole (priLOSEC) 20 MG capsule, Take 1 capsule by mouth Daily., Disp: , Rfl:     simvastatin (ZOCOR) 20 MG tablet, Take 1 tablet by mouth Every Night., Disp: , Rfl:     spironolactone (ALDACTONE) 50 MG tablet, Take 1 tablet by mouth Daily., Disp: 90 tablet, Rfl: 3  Vital Signs:   /75   Pulse 63   Ht 147.3 cm (58\")   Wt 83.5 kg (184 lb)   SpO2 99%   BMI 38.46 kg/m²     Vitals and nursing note reviewed.   Constitutional:       General: Not in acute distress.     Appearance: Normal and healthy appearance. Well-developed and not in distress. Obese. Not diaphoretic.   Eyes:      General: Lids are normal.         Right eye: No discharge.         Left eye: No discharge.      Conjunctiva/sclera: " Conjunctivae normal.      Pupils: Pupils are equal, round, and reactive to light.   HENT:      Head: Normocephalic and atraumatic.      Jaw: There is normal jaw occlusion.      Right Ear: External ear normal.      Left Ear: External ear normal.      Nose: Nose normal.   Neck:      Thyroid: No thyromegaly.      Vascular: No carotid bruit, JVD or JVR. JVD normal.      Trachea: Trachea normal. No tracheal deviation.   Pulmonary:      Effort: Pulmonary effort is normal. No respiratory distress.      Breath sounds: Normal breath sounds. No decreased breath sounds. No wheezing. No rhonchi. No rales.   Chest:      Chest wall: Not tender to palpatation.   Cardiovascular:      PMI at left midclavicular line. Bradycardia present. Regular rhythm. Normal S1. Normal S2.       Murmurs: There is no murmur.      No gallop.  No click. No rub.   Pulses:     Intact distal pulses. No decreased pulses.   Edema:     Peripheral edema present.     Pretibial: bilateral 1+ edema of the pretibial area.     Ankle: bilateral 1+ edema of the ankle.     Feet: bilateral 1+ edema of the feet.  Abdominal:      General: Bowel sounds are normal. There is no distension.      Palpations: Abdomen is soft.      Tenderness: There is no abdominal tenderness.   Musculoskeletal: Normal range of motion.         General: No tenderness or deformity.      Cervical back: Normal range of motion and neck supple. Skin:     General: Skin is warm and dry.      Coloration: Skin is not pale.      Findings: No erythema or rash.   Neurological:      General: No focal deficit present.      Mental Status: Alert, oriented to person, place, and time and oriented to person, place and time.   Psychiatric:         Attention and Perception: Attention and perception normal.         Mood and Affect: Mood and affect normal.         Speech: Speech normal.         Behavior: Behavior normal.         Thought Content: Thought content normal.         Cognition and Memory: Cognition and  memory normal.         Judgment: Judgment normal.      Result Review :   The following data was reviewed by: CASSIDY Rodriguez on 9/5/2023:  Common labs          11/8/2022    13:17 1/9/2023    10:37   Common Labs   Glucose 161  78    BUN 24  17    Creatinine 0.77  0.52    Sodium 137  139    Potassium 4.2  4.2    Chloride 103  102    Calcium 9.5  9.5    WBC 10.58  11.57    Hemoglobin 13.5  14.8    Hematocrit 41.0  46.1    Platelets 297  259    Radiology exam- bilateral lower extremity venous ultrasound 7/11/22 and cardiology exam- 2d echo 9/13/22         Assessment and Plan    Diagnoses and all orders for this visit:    1. Essential hypertension (Primary)-blood pressure is well controlled. Continue spironolactone, HCTZ, lisinopril, clonidine and metoprolol succinate.  Monitor and record daily blood pressure. Report readings consistently higher than 130/80 or consistently lower than 100/60.     2. Mixed hyperlipidemia-management per PCP.  Continue simvastatin.    3. KATY treated with BiPAP-patient reports compliance with BiPAP.  Stable.    4. Class 2 severe obesity due to excess calories with serious comorbidity and body mass index (BMI) of 38.0 to 38.9 in adult (HCC)- Class 2 Severe Obesity (BMI >=35 and <=39.9). Obesity-related health conditions include the following: obstructive sleep apnea, hypertension and dyslipidemias. Obesity is unchanged. BMI is is above average; no BMI management plan is appropriate. We discussed low calorie, low carb based diet program, portion control and increasing exercise. Referral to bariatric surgery weight loss program.     5.  Venous insufficiency- present on bilateral lower extremity venous ultrasound 7/11/2022.  Pt is following with Dr. Serafin Sher with vascular surgery.  Status post right lower extremity venous ablation on 11/14/2022. Left lower extremity 1/16/2023.     6. Chronic diastolic congestive heart failure (HCC)- NYHA class II. Compensated.  Patient was unable  to tolerate Entresto due to reported skin irritation and pruritus. Reviewed signs and symptoms of CHF and what to report with the patient. Patient instructed to restrict sodium and weigh daily. Report weight gain of greater than 2 lbs overnight or 5 lbs in 1 week. Pt verbalized understanding of instructions and plan of care.  Continue spironolactone, Farxiga, lisinopril and metoprolol succinate.       Follow Up   Return in about 3 months (around 12/5/2023) for Next scheduled follow up.  Patient was given instructions and counseling regarding her condition or for health maintenance advice. Please see specific information pulled into the AVS if appropriate.

## 2023-09-05 ENCOUNTER — LAB (OUTPATIENT)
Dept: LAB | Facility: HOSPITAL | Age: 73
End: 2023-09-05
Payer: MEDICARE

## 2023-09-05 ENCOUNTER — OFFICE VISIT (OUTPATIENT)
Dept: CARDIOLOGY | Facility: CLINIC | Age: 73
End: 2023-09-05
Payer: MEDICARE

## 2023-09-05 VITALS
OXYGEN SATURATION: 99 % | HEIGHT: 58 IN | DIASTOLIC BLOOD PRESSURE: 75 MMHG | HEART RATE: 63 BPM | SYSTOLIC BLOOD PRESSURE: 121 MMHG | BODY MASS INDEX: 38.62 KG/M2 | WEIGHT: 184 LBS

## 2023-09-05 DIAGNOSIS — I50.32 CHRONIC DIASTOLIC CONGESTIVE HEART FAILURE: ICD-10-CM

## 2023-09-05 DIAGNOSIS — I10 ESSENTIAL HYPERTENSION: Primary | ICD-10-CM

## 2023-09-05 DIAGNOSIS — E78.2 MIXED HYPERLIPIDEMIA: ICD-10-CM

## 2023-09-05 DIAGNOSIS — E66.01 CLASS 2 SEVERE OBESITY DUE TO EXCESS CALORIES WITH SERIOUS COMORBIDITY AND BODY MASS INDEX (BMI) OF 38.0 TO 38.9 IN ADULT: ICD-10-CM

## 2023-09-05 DIAGNOSIS — I87.2 VENOUS INSUFFICIENCY: ICD-10-CM

## 2023-09-05 DIAGNOSIS — G47.33 OSA TREATED WITH BIPAP: ICD-10-CM

## 2023-09-05 LAB
ANION GAP SERPL CALCULATED.3IONS-SCNC: 10 MMOL/L (ref 5–15)
BUN SERPL-MCNC: 22 MG/DL (ref 8–23)
BUN/CREAT SERPL: 36.7 (ref 7–25)
CALCIUM SPEC-SCNC: 9.8 MG/DL (ref 8.6–10.5)
CHLORIDE SERPL-SCNC: 102 MMOL/L (ref 98–107)
CO2 SERPL-SCNC: 26 MMOL/L (ref 22–29)
CREAT SERPL-MCNC: 0.6 MG/DL (ref 0.57–1)
EGFRCR SERPLBLD CKD-EPI 2021: 94.3 ML/MIN/1.73
GLUCOSE SERPL-MCNC: 93 MG/DL (ref 65–99)
POTASSIUM SERPL-SCNC: 5 MMOL/L (ref 3.5–5.2)
SODIUM SERPL-SCNC: 138 MMOL/L (ref 136–145)

## 2023-09-05 PROCEDURE — 80048 BASIC METABOLIC PNL TOTAL CA: CPT

## 2023-09-22 DIAGNOSIS — I65.23 BILATERAL CAROTID ARTERY STENOSIS: Primary | ICD-10-CM

## 2023-10-03 RX ORDER — DAPAGLIFLOZIN 10 MG/1
TABLET, FILM COATED ORAL
Qty: 30 TABLET | Refills: 11 | Status: SHIPPED | OUTPATIENT
Start: 2023-10-03

## 2023-11-30 ENCOUNTER — TELEPHONE (OUTPATIENT)
Dept: CARDIOLOGY | Facility: CLINIC | Age: 73
End: 2023-11-30
Payer: MEDICARE

## 2023-11-30 NOTE — TELEPHONE ENCOUNTER
After speaking with CASSIDY Aguilera she has advised patient to discontinue her Farziga and states that other similar medications would most likely cause the same problems. We will touch base at patient's scheduled follow up in Jan 2024. Patient has been advised and has voiced understanding.

## 2023-11-30 NOTE — TELEPHONE ENCOUNTER
Caller: Esha Boogie    Relationship: Self    Best call back number: [phone]     What is the best time to reach you: ANYTIME    Who are you requesting to speak with (clinical staff, provider,  specific staff member): NURSE    Do you know the name of the person who called: ESHA    What was the call regarding: PATIENT CALLED IN STATING SHE TAKES FARXIGA 10MG AND SHE THINKS ITS CAUSING HER TO HAVE A YEAST INFECTION THAT SHE CAN'T GET RID OF. PATIENT REQUEST A CALL BACK FROM DR. DALEY'S NURSE. PATIENT SAW HER PCP ON 11.22.23 AND HE GAVE HER MEDICATION THAT HELPED BUT DIDN'T CLEAR UP THE YEAST INFECTION. PATIENT DID NOT TAKE HER FARXIGA 10MG TODAY.     Is it okay if the provider responds through [facility]: NO, PLEASE CALL.

## 2023-11-30 NOTE — TELEPHONE ENCOUNTER
Patient's call has been returned and patient plans to discontinue her Farxiga at this time. I will discuss with  and see if he wants to send any other medication in it's place.

## 2023-12-11 ENCOUNTER — OFFICE VISIT (OUTPATIENT)
Dept: NEUROSURGERY | Facility: CLINIC | Age: 73
End: 2023-12-11
Payer: MEDICARE

## 2023-12-11 VITALS — BODY MASS INDEX: 37.99 KG/M2 | HEIGHT: 58 IN | WEIGHT: 181 LBS

## 2023-12-11 DIAGNOSIS — M25.811 IMPINGEMENT OF RIGHT SHOULDER: ICD-10-CM

## 2023-12-11 DIAGNOSIS — Z78.9 NONSMOKER: ICD-10-CM

## 2023-12-11 DIAGNOSIS — E66.01 CLASS 2 SEVERE OBESITY DUE TO EXCESS CALORIES WITH SERIOUS COMORBIDITY AND BODY MASS INDEX (BMI) OF 38.0 TO 38.9 IN ADULT: ICD-10-CM

## 2023-12-11 DIAGNOSIS — M47.12 CERVICAL SPONDYLOSIS WITH MYELOPATHY: Primary | ICD-10-CM

## 2023-12-11 DIAGNOSIS — M50.30 DDD (DEGENERATIVE DISC DISEASE), CERVICAL: ICD-10-CM

## 2023-12-11 RX ORDER — NYSTATIN 100000 [USP'U]/G
POWDER TOPICAL
COMMUNITY
Start: 2023-05-14

## 2023-12-11 RX ORDER — SPIRONOLACTONE 25 MG/1
1 TABLET ORAL DAILY
COMMUNITY

## 2023-12-11 RX ORDER — MULTIPLE VITAMINS W/ MINERALS TAB 9MG-400MCG
TAB ORAL DAILY
COMMUNITY

## 2023-12-11 RX ORDER — SULFAMETHOXAZOLE AND TRIMETHOPRIM 800; 160 MG/1; MG/1
TABLET ORAL
COMMUNITY

## 2023-12-11 RX ORDER — MELOXICAM 15 MG/1
TABLET ORAL
COMMUNITY

## 2023-12-11 RX ORDER — CEPHALEXIN 500 MG/1
CAPSULE ORAL
COMMUNITY

## 2023-12-11 RX ORDER — MONTELUKAST SODIUM 10 MG/1
1 TABLET ORAL DAILY
COMMUNITY

## 2023-12-11 RX ORDER — NAPROXEN 500 MG/1
TABLET ORAL
COMMUNITY

## 2023-12-11 RX ORDER — LORATADINE 10 MG/1
10 CAPSULE, LIQUID FILLED ORAL DAILY
COMMUNITY

## 2023-12-11 RX ORDER — NYSTATIN 100000 U/G
CREAM TOPICAL
COMMUNITY
Start: 2023-12-04

## 2023-12-11 RX ORDER — FLUCONAZOLE 150 MG/1
TABLET ORAL
COMMUNITY

## 2023-12-11 RX ORDER — CLONIDINE HYDROCHLORIDE 0.1 MG/1
TABLET ORAL
COMMUNITY

## 2023-12-11 RX ORDER — CLOTRIMAZOLE 1 %
CREAM (GRAM) TOPICAL
COMMUNITY

## 2023-12-11 RX ORDER — METOCLOPRAMIDE 10 MG/1
TABLET ORAL
COMMUNITY

## 2023-12-11 RX ORDER — GABAPENTIN 100 MG/1
CAPSULE ORAL
Qty: 90 CAPSULE | Refills: 0 | Status: SHIPPED | OUTPATIENT
Start: 2023-12-11

## 2023-12-11 NOTE — PROGRESS NOTES
Chief complaint:   Chief Complaint   Patient presents with    Back Pain     Pain in thoracic spine-pt states having trouble with back shoulder and arm pain right side. Pain been going for a year.Went to chiropractor last November. Went to medical clinic and she stated was arthritis.        Subjective     HPI: Esha presents today with her  for evaluation of chronic right-sided neck and parascapular pain with radiation down the right arm.  Symptoms started about 1 year ago without injury and worsened after chiropractic adjustment.  She  has been sleeping in a recliner for the last 6 weeks because she cannot get comfortable in bed.  She admits to balance issues and hand dexterity issues.  Her  has to put in her earrings and button her blouses.  She intermittently has numbness and tingling to all the fingers of the right hand and admits to right upper extremity weakness.  She has tried lidocaine patches, meloxicam and Aleve without symptom improvement.    Review of Systems     Past Medical History:   Diagnosis Date    CHF (congestive heart failure)     GERD (gastroesophageal reflux disease)     Hyperlipidemia     Hypertension     Peripheral vascular disease     PONV (postoperative nausea and vomiting)     Sleep apnea     pt sleeps with a bipap machine     Past Surgical History:   Procedure Laterality Date    ENDOSCOPY  07/2023    HYSTERECTOMY      Partial    TOTAL HIP ARTHROPLASTY Bilateral     VARICOSE VEIN SURGERY Right 11/14/2022    Procedure: RIGHT SAPHENOUS VEIN RADIO FREQUENCY ABLATION;  Surgeon: Serafin Sher DO;  Location:  [facility];  Service: Vascular;  Laterality: Right;    VARICOSE VEIN SURGERY Left 01/16/2023    Procedure: LEFT SAPHENOUS VEIN RADIO FREQUENCY ABLATION;  Surgeon: Serafin Sher DO;  Location:  [facility];  Service: Vascular;  Laterality: Left;     Family History   Problem Relation Age of Onset    Hypertension Mother     Hypertension Father   "    Social History     Tobacco Use    Smoking status: Never    Smokeless tobacco: Never   Vaping Use    Vaping Use: Never used   Substance Use Topics    Alcohol use: Never    Drug use: Never     (Not in a hospital admission)    Allergies:  Entresto [sacubitril-valsartan], Latex, and Silicone    Objective      Vital Signs  Ht 147.3 cm (57.99\")   Wt 82.1 kg (181 lb)   BMI 37.84 kg/m²     Physical Exam  Constitutional:       Appearance: She is well-developed. She is obese.   HENT:      Head: Normocephalic.   Eyes:      General: Lids are normal.      Conjunctiva/sclera: Conjunctivae normal.      Pupils: Pupils are equal, round, and reactive to light.   Neck:      Comments: + Spur rightling's on the right  Cardiovascular:      Rate and Rhythm: Normal rate and regular rhythm.   Pulmonary:      Effort: Pulmonary effort is normal.      Breath sounds: Normal breath sounds.   Musculoskeletal:         General: Tenderness (TTP right anterior shoulder with limited internal and external rotation and flexion/extension) present.      Cervical back: Tenderness present.   Skin:     General: Skin is warm.   Neurological:      Mental Status: She is alert and oriented to person, place, and time.      GCS: GCS eye subscore is 4. GCS verbal subscore is 5. GCS motor subscore is 6.      Cranial Nerves: Cranial nerves 2-12 are intact. No cranial nerve deficit.      Sensory: Sensory deficit present.      Motor: Weakness present.      Coordination: Coordination abnormal.      Gait: Gait abnormal and tandem walk abnormal.      Deep Tendon Reflexes: Reflexes abnormal.      Reflex Scores:       Tricep reflexes are 3+ on the right side and 3+ on the left side.       Bicep reflexes are 3+ on the right side and 3+ on the left side.       Brachioradialis reflexes are 3+ on the right side and 3+ on the left side.       Patellar reflexes are 3+ on the right side and 3+ on the left side.       Achilles reflexes are 3+ on the right side and 3+ on the " left side.  Psychiatric:         Speech: Speech normal.         Behavior: Behavior normal.         Thought Content: Thought content normal.         Neurologic Exam     Mental Status   Oriented to person, place, and time.   Speech: speech is normal   Level of consciousness: alert  Knowledge: good.     Cranial Nerves   Cranial nerves II through XII intact.     CN III, IV, VI   Pupils are equal, round, and reactive to light.    Motor Exam   Muscle bulk: normal  Overall muscle tone: normal    Strength   Right deltoid: 4/5  Left deltoid: 5/5  Right biceps: 5/5  Left biceps: 5/5  Right triceps: 4/5  Left triceps: 5/5  Right wrist flexion: 5/5  Left wrist flexion: 5/5  Right wrist extension: 5/5  Left wrist extension: 5/5  Right interossei: 4/5  Left interossei: 5/5    Sensory Exam   Decreased sensation to light touch right anterior forearm and all the fingers of the right hand.     Gait, Coordination, and Reflexes     Coordination   Tandem walking coordination: abnormal    Reflexes   Right brachioradialis: 3+  Left brachioradialis: 3+  Right biceps: 3+  Left biceps: 3+  Right triceps: 3+  Left triceps: 3+  Right patellar: 3+  Left patellar: 3+  Right achilles: 3+  Left achilles: 3+  Right : 1+  Left : 2+  Right Hopson: present  Left Hopson: absent  Right ankle clonus: absent  Left ankle clonus: absent      Imaging review: AP and lateral thoracic spine films demonstrate exaggerated thoracic lordosis. Diffuse disc degeneration upper thoracic levels. No compression deformities or acute findings.    AP, lateral, flexion-extension and swimmer's views were reviewed and demonstrates at least moderate disc degeneration C4-7.  There appears to be anterior listhesis C6-7, seen on swimmer's view.  No acute findings.            Assessment/Plan: Esha has significant disc degeneration C4-7 with anterior listhesis C6 over 7.  She has symptoms concerning for myelopathy as demonstrated by very unstable tandem gait, global  hyperreflexia right deltoid, triceps and  weakness.  She also has some significant pain in the right shoulder with impingement signs.    To better assess her issues, I would like to get an MRI of the cervical spine.    We will get her started in some physical therapy to see if we can get some of her neck pain under control as well as preserve range of motion to the right shoulder to prevent adhesive capsulitis.  We will look at getting her set up with general Ortho for evaluation of the shoulder depending on what the cervical MRI shows.    She will continue Aleve as current.  I am adding gabapentin 100 mg tablet, 1-3 at bedtime.  She will let us know if the dose needs titrated up.    She will avoid any heavy lifting and overhead lifting.    She will follow-up with me after the MRI is completed for review.    I advised the patient to call and return sooner for new or worsening complaints of weakness, paresthesias, gait disturbances, or any additional concerns.  Treatment options discussed in detail with Esha and the patient voiced understanding.  Ms. Boogie agrees with this plan of care.     Patient is a nonsmoker    The patient's Body mass index is 37.84 kg/m².. BMI is above normal parameters. Recommendations include: educational material    ADVANCED CARE PLANNING  Information on advance directives provided in AVS.    ONESIMO Fall Risk Assessment was completed, and patient is at LOW risk for falls.Assessment completed on:12/11/2023       Diagnoses and all orders for this visit:    1. Cervical spondylosis with myelopathy (Primary)    2. DDD (degenerative disc disease), cervical    3. Impingement of right shoulder    4. Class 2 severe obesity due to excess calories with serious comorbidity and body mass index (BMI) of 38.0 to 38.9 in adult    5. Nonsmoker          I discussed the patients findings and my recommendations with patient    Phuc Jones PA-C  12/11/23  10:30 CST

## 2024-01-04 ENCOUNTER — OFFICE VISIT (OUTPATIENT)
Dept: CARDIOLOGY | Facility: CLINIC | Age: 74
End: 2024-01-04
Payer: MEDICARE

## 2024-01-04 VITALS
SYSTOLIC BLOOD PRESSURE: 125 MMHG | WEIGHT: 183 LBS | RESPIRATION RATE: 18 BRPM | HEIGHT: 58 IN | HEART RATE: 78 BPM | OXYGEN SATURATION: 99 % | DIASTOLIC BLOOD PRESSURE: 80 MMHG | BODY MASS INDEX: 38.41 KG/M2

## 2024-01-04 DIAGNOSIS — I87.2 VENOUS INSUFFICIENCY: ICD-10-CM

## 2024-01-04 DIAGNOSIS — I10 ESSENTIAL HYPERTENSION: ICD-10-CM

## 2024-01-04 DIAGNOSIS — G47.33 OSA TREATED WITH BIPAP: ICD-10-CM

## 2024-01-04 DIAGNOSIS — I50.32 CHRONIC DIASTOLIC CONGESTIVE HEART FAILURE: Primary | ICD-10-CM

## 2024-01-04 NOTE — PROGRESS NOTES
Subjective:     Encounter Date:01/04/2024      Patient ID: Esha Boogie is a 73 y.o. female     Chief Complaint:  History of Present Illness  Patient presents today for routine cardiology follow up. Patient is followed for hypertension, mild pulmonary hyperetnsion and chronic diastolic congestive heart failure. Patient has obesity, sleep apnea, hyperlipidemia, venous insufficiency. Of note patient had been on entresto in the past but did not tolerate due to skin irritation and pruritus. Patient is on aldactone, Lisinopril, and Metoprolol succinate. Patient stopped Farxiga due to yeast infections- she is asking for a substitute. She has shortness of breath that is at baseline. She notes chronic leg swelling. Worse in her right. She has worn compression stockings in the past but has not recently. Overall she is stable. She follows with Charly BENJAMIN as her PCP. She follows with Dr. Sher for lower leg edema and had an ablation in the past.     The following portions of the patient's history were reviewed and updated as appropriate: allergies, current medications, past medical history, past social history, past and problem list.    Allergies   Allergen Reactions    Entresto [Sacubitril-Valsartan] Itching and Rash    Latex Rash    Silicone Rash       Current Outpatient Medications:     cephalexin (KEFLEX) 500 MG capsule, Take 1 capsule 3 times a day by oral route for 7 days., Disp: , Rfl:     cloNIDine (CATAPRES) 0.1 MG tablet, 3 TIMES DAILY AS NEEDED, Disp: , Rfl:     clotrimazole (LOTRIMIN) 1 % cream, APPLY TO THE AFFECTED AND SURROUNDING AREAS OF SKIN BY TOPICAL ROUTE 2 TIMES PER DAY IN THE MORNING AND EVENING, Disp: , Rfl:     furosemide (LASIX) 40 MG tablet, Take 1 tablet by mouth Daily As Needed (daily as needed for increased shortness of breath, swelling and/or weight gain)., Disp: 90 tablet, Rfl: 3    gabapentin (NEURONTIN) 100 MG capsule, 1-3 at night, Disp: 90 capsule, Rfl: 0     hydroCHLOROthiazide (HYDRODIURIL) 50 MG tablet, TAKE ONE TABLET BY MOUTH ONCE EVERY DAY, Disp: 90 tablet, Rfl: 3    lisinopril (PRINIVIL,ZESTRIL) 40 MG tablet, Take 1 tablet by mouth Daily., Disp: 90 tablet, Rfl: 3    Loratadine 10 MG capsule, Take 1 capsule by mouth Daily., Disp: , Rfl:     meloxicam (MOBIC) 15 MG tablet, Take 1 tablet every day by oral route for 14 days., Disp: , Rfl:     metoclopramide (REGLAN) 10 MG tablet, , Disp: , Rfl:     metoprolol succinate XL (TOPROL-XL) 100 MG 24 hr tablet, Take 1 tablet by mouth Daily., Disp: , Rfl:     montelukast (SINGULAIR) 10 MG tablet, 1 tablet Daily., Disp: , Rfl:     multivitamin with minerals (PRESERVISION AREDS PO), Daily., Disp: , Rfl:     naproxen (NAPROSYN) 500 MG tablet, , Disp: , Rfl:     nystatin (MYCOSTATIN) 454139 UNIT/GM cream, APPLY TO THE AFFECTED AREA(S) BY TOPICAL ROUTE 2 TIMES PER DAY, Disp: , Rfl:     nystatin (MYCOSTATIN) 848071 UNIT/GM powder, APPLY TO AFFECTED AREA TWICE A DAY FOR 10 DAYS, Disp: , Rfl:     omeprazole (priLOSEC) 20 MG capsule, Take 1 capsule by mouth Daily., Disp: , Rfl:     simvastatin (ZOCOR) 20 MG tablet, Take 1 tablet by mouth Every Night., Disp: , Rfl:     spironolactone (ALDACTONE) 25 MG tablet, 1 tablet Daily., Disp: , Rfl:     spironolactone (ALDACTONE) 50 MG tablet, Take 1 tablet by mouth Daily., Disp: 90 tablet, Rfl: 3    sulfamethoxazole-trimethoprim (BACTRIM DS,SEPTRA DS) 800-160 MG per tablet, , Disp: , Rfl:     empagliflozin (Jardiance) 10 MG tablet tablet, Take 1 tablet by mouth Daily., Disp: 30 tablet, Rfl: 11    Social History     Socioeconomic History    Marital status:    Tobacco Use    Smoking status: Never    Smokeless tobacco: Never   Vaping Use    Vaping Use: Never used   Substance and Sexual Activity    Alcohol use: Never    Drug use: Never    Sexual activity: Defer       Review of Systems   Constitutional: Positive for malaise/fatigue. Negative for chills, decreased appetite, fever, weight gain  and weight loss.   HENT:  Negative for nosebleeds.    Eyes:  Negative for visual disturbance.   Cardiovascular:  Positive for leg swelling. Negative for chest pain, dyspnea on exertion, near-syncope, orthopnea, palpitations, paroxysmal nocturnal dyspnea and syncope.   Respiratory:  Positive for shortness of breath. Negative for cough, hemoptysis and snoring.    Endocrine: Negative for cold intolerance and heat intolerance.   Hematologic/Lymphatic: Negative for bleeding problem. Does not bruise/bleed easily.   Skin:  Negative for rash.   Musculoskeletal:  Negative for back pain and falls.   Gastrointestinal:  Negative for abdominal pain, constipation, diarrhea, heartburn, melena, nausea and vomiting.   Genitourinary:  Negative for hematuria.   Neurological:  Negative for dizziness, headaches and light-headedness.   Psychiatric/Behavioral:  Negative for altered mental status.    Allergic/Immunologic: Negative for persistent infections.              Objective:     Constitutional:       Appearance: Healthy appearance. Not in distress.   Eyes:      Pupils: Pupils are equal, round, and reactive to light.   HENT:      Head: Normocephalic and atraumatic.      Nose: Nose normal.    Mouth/Throat:      Dentition: Normal.   Pulmonary:      Effort: Pulmonary effort is normal.      Breath sounds: Normal breath sounds.   Chest:      Chest wall: Not tender to palpatation.   Cardiovascular:      PMI at left midclavicular line. Normal rate. Regular rhythm.      Murmurs: There is no murmur.   Pulses:     Intact distal pulses.   Edema:     Peripheral edema present.     Ankle: trace edema of the left ankle and 1+ edema of the right ankle.  Abdominal:      General: Bowel sounds are normal.      Palpations: Abdomen is soft.   Musculoskeletal: Normal range of motion.      Cervical back: Normal range of motion. Skin:     General: Skin is warm and dry.   Neurological:      Mental Status: Alert, oriented to person, place, and time and  "oriented to person, place and time.   Psychiatric:         Attention and Perception: Attention normal.         Mood and Affect: Mood normal.           Procedures  /80 (BP Location: Right arm, Patient Position: Sitting, Cuff Size: Large Adult)   Pulse 78   Resp 18   Ht 147.3 cm (58\")   Wt 83 kg (183 lb)   SpO2 99%   BMI 38.25 kg/m²   Lab Review:   I have reviewed       No results found for: \"CHOL\", \"CHLPL\", \"TRIG\", \"HDL\", \"LDL\", \"LDLDIRECT\"   Results for orders placed during the hospital encounter of 09/13/22    Adult Transthoracic Echo Complete w/ Color, Spectral and Contrast if necessary per protocol    Interpretation Summary  · Left ventricular ejection fraction appears to be 61 - 65%. Left ventricular systolic function is normal.  · Left ventricular diastolic function is consistent with (grade II w/high LAP) pseudonormalization.  · Mild pulmonary hypertension is present.     Assessment:          Diagnosis Plan   1. Chronic diastolic congestive heart failure  Basic Metabolic Panel      2. Essential hypertension        3. KATY treated with BiPAP        4. Venous insufficiency               Plan:       Chronic diastolic Congestive Heart Failure - Overall stable. Off Farxiga due to yeast infection. I discussed Jardiance and possible same side effects. She wishes to try. Gave Samples. BMP in 2-3 weeks. On Aldactone, Metoprolol and Aldactone. Overall stable. She notes she has not been compliant with low salt. Discussed importance with patient.   Essential Hypertension - blood pressure controlled.  Sleep apnea - compliant  Venous Insufficiency - encouraged low salt diet, and compression. Follows with vascular.     Follow up in 3 months              "

## 2024-01-05 ENCOUNTER — HOSPITAL ENCOUNTER (OUTPATIENT)
Dept: MRI IMAGING | Facility: HOSPITAL | Age: 74
Discharge: HOME | End: 2024-01-05
Admitting: PHYSICIAN ASSISTANT
Payer: MEDICARE

## 2024-01-05 DIAGNOSIS — M50.30 DDD (DEGENERATIVE DISC DISEASE), CERVICAL: ICD-10-CM

## 2024-01-05 DIAGNOSIS — M47.12 CERVICAL SPONDYLOSIS WITH MYELOPATHY: ICD-10-CM

## 2024-01-05 PROCEDURE — 72141 MRI NECK SPINE W/O DYE: CPT

## 2024-02-05 ENCOUNTER — TELEPHONE (OUTPATIENT)
Dept: NEUROSURGERY | Facility: CLINIC | Age: 74
End: 2024-02-05

## 2024-02-05 ENCOUNTER — OFFICE VISIT (OUTPATIENT)
Dept: NEUROSURGERY | Facility: CLINIC | Age: 74
End: 2024-02-05
Payer: MEDICARE

## 2024-02-05 VITALS — BODY MASS INDEX: 38.79 KG/M2 | HEIGHT: 58 IN | WEIGHT: 184.8 LBS

## 2024-02-05 DIAGNOSIS — Z78.9 NONSMOKER: ICD-10-CM

## 2024-02-05 DIAGNOSIS — M50.30 DDD (DEGENERATIVE DISC DISEASE), CERVICAL: Primary | ICD-10-CM

## 2024-02-05 DIAGNOSIS — E66.09 CLASS 2 OBESITY DUE TO EXCESS CALORIES WITHOUT SERIOUS COMORBIDITY WITH BODY MASS INDEX (BMI) OF 38.0 TO 38.9 IN ADULT: ICD-10-CM

## 2024-02-05 PROBLEM — E66.812 CLASS 2 OBESITY DUE TO EXCESS CALORIES WITHOUT SERIOUS COMORBIDITY WITH BODY MASS INDEX (BMI) OF 38.0 TO 38.9 IN ADULT: Status: ACTIVE | Noted: 2024-02-05

## 2024-02-05 PROCEDURE — 1159F MED LIST DOCD IN RCRD: CPT | Performed by: PHYSICIAN ASSISTANT

## 2024-02-05 PROCEDURE — 1160F RVW MEDS BY RX/DR IN RCRD: CPT | Performed by: PHYSICIAN ASSISTANT

## 2024-02-05 PROCEDURE — 99214 OFFICE O/P EST MOD 30 MIN: CPT | Performed by: PHYSICIAN ASSISTANT

## 2024-02-05 NOTE — PROGRESS NOTES
"    Chief complaint:   Chief Complaint   Patient presents with    Follow-up     Pt reports to office for follow up after MRI        Subjective     HPI: Esha comes in today for MRI review of the cervical spine.  She states that her pain and numbness are 100% relieved.  She took gabapentin for total of about 4 weeks.  She still reports some right upper extremity weakness but it is also improving.  She continues with some balance issues but she indicates that that is her norm and thinks that is related to her hips.  She so far has attended 1 physical therapy session but has others coming up.  She has been doing home exercises.    Review of Systems      Objective      Vital Signs  Ht 147.3 cm (57.99\")   Wt 83.8 kg (184 lb 12.8 oz)   BMI 38.63 kg/m²     Physical Exam  Constitutional:       Appearance: She is well-developed. She is obese.   HENT:      Head: Normocephalic.   Eyes:      Pupils: Pupils are equal, round, and reactive to light.   Neck:      Comments: Full nontender range of motion in all planes  Cardiovascular:      Rate and Rhythm: Normal rate.   Pulmonary:      Effort: Pulmonary effort is normal.   Musculoskeletal:         General: Normal range of motion.      Cervical back: Normal range of motion. No tenderness.   Skin:     General: Skin is warm.   Neurological:      Mental Status: She is alert and oriented to person, place, and time.      GCS: GCS eye subscore is 4. GCS verbal subscore is 5. GCS motor subscore is 6.      Cranial Nerves: No cranial nerve deficit.      Sensory: No sensory deficit.      Motor: Weakness present.      Coordination: Coordination normal.      Gait: Gait normal.      Deep Tendon Reflexes: Reflexes are normal and symmetric. Reflexes normal.      Reflex Scores:       Tricep reflexes are 2+ on the right side and 2+ on the left side.       Bicep reflexes are 2+ on the right side and 2+ on the left side.       Brachioradialis reflexes are 2+ on the right side and 2+ on the left " side.       Patellar reflexes are 2+ on the right side and 2+ on the left side.       Achilles reflexes are 2+ on the right side and 2+ on the left side.  Psychiatric:         Speech: Speech normal.         Behavior: Behavior normal.         Thought Content: Thought content normal.         Neurologic Exam     Mental Status   Oriented to person, place, and time.   Speech: speech is normal   Level of consciousness: alert  Knowledge: good.     Cranial Nerves     CN III, IV, VI   Pupils are equal, round, and reactive to light.    Motor Exam There is mild right deltoid, triceps and  weakness on the right graded 4+/5.  Otherwise strength is 5/5 bilateral upper extremities.     Sensory Exam   Light touch normal.     Gait, Coordination, and Reflexes     Reflexes   Right brachioradialis: 2+  Left brachioradialis: 2+  Right biceps: 2+  Left biceps: 2+  Right triceps: 2+  Left triceps: 2+  Right patellar: 2+  Left patellar: 2+  Right achilles: 2+  Left achilles: 2+  Right : 1+  Left : 2+  Right Hopson: absent  Left Hopson: absentTandem gait remains unsteady       Imaging review: MRI of the cervical spine was reviewed and demonstrates Modic changes C4-7.  There is mild central stenosis C4-5.  No high-grade central stenosis throughout.  There is  multilevel neuroforaminal stenosis on the right, C4/5, C5/6 and C6/7. No cord signal change or lesions        Assessment/Plan: I reviewed images in detail with the patient and her .  She has multilevel disc degeneration and neuroforaminal stenosis.  Central stenosis is greatest at C4-5 and is mild.  Fortunately, her pain has resolved.  She does have some mild weakness still to the right upper extremity.    I recommend she continue her home exercises and follow-up with PT as scheduled.  I recommend they give her some exercises for right upper extremity strengthening.    We discussed avoidance of aggravating activities, particularly heavy lifting and overhead  lifting.    She has gabapentin on hand to utilize should her pain recur.    She will follow-up in 8 to 9 weeks with Dr. Franklin, sooner should she have any increased pain, weakness or other concerns.    Patient is a nonsmoker    The patient's Body mass index is 38.63 kg/m².. BMI is above normal parameters. Recommendations include: educational material    ADVANCED CARE PLANNING  Information on advance directives provided in AVS.    ONESIMO Fall Risk Assessment has not been completed.     Diagnoses and all orders for this visit:    1. DDD (degenerative disc disease), cervical (Primary)    2. Nonsmoker    3. Class 2 obesity due to excess calories without serious comorbidity with body mass index (BMI) of 38.0 to 38.9 in adult        I discussed the patients findings and my recommendations with patient  Phuc Jones PA-C  02/05/24  12:03 CST

## 2024-02-05 NOTE — PATIENT INSTRUCTIONS
Finish PT  Avoid heavy lifting and overhead lifting.  Follow up with Dr. Franklin in 8-10 weeks, sooner if you have any increased pain or worsening of weakness

## 2024-02-05 NOTE — TELEPHONE ENCOUNTER
called patient to see if she could come in today at 11 pt verbalized understanding and stated she would try her best.

## 2024-03-14 ENCOUNTER — TRANSCRIBE ORDERS (OUTPATIENT)
Dept: ADMINISTRATIVE | Facility: HOSPITAL | Age: 74
End: 2024-03-14
Payer: MEDICARE

## 2024-03-14 DIAGNOSIS — Z12.31 ENCOUNTER FOR SCREENING MAMMOGRAM FOR MALIGNANT NEOPLASM OF BREAST: Primary | ICD-10-CM

## 2024-03-22 ENCOUNTER — TRANSCRIBE ORDERS (OUTPATIENT)
Dept: ADMINISTRATIVE | Facility: HOSPITAL | Age: 74
End: 2024-03-22
Payer: MEDICARE

## 2024-03-22 DIAGNOSIS — Z78.0 ASYMPTOMATIC MENOPAUSAL STATE: Primary | ICD-10-CM

## 2024-03-25 LAB
NCCN CRITERIA FLAG: NORMAL
TYRER CUZICK SCORE: 3.1

## 2024-04-03 NOTE — PROGRESS NOTES
Chief Complaint  Congestive Heart Failure (3mo F/U. Started Jardiance LOV. Was stopped due to intolerance/)     Subjective          Esha Boogie presents to [facility] for routine follow-up of medication adjustment.  She was started on Jardiance 10 mg daily at her last office visit on 1/4/2024.  However, this was discontinued due to rash, candidiasis and pruritis.  She has chronic diastolic congestive heart failure, hypertension, hyperlipidemia, obstructive sleep apnea, GERD, venous insufficiency and obesity. She complains of chronic bilateral lower extremity edema that has worsened over the last several months. She reports occasional palpitations. Patient denies chest pain, shortness of breath, dizziness, syncope, orthopnea, PND or decreased stamina.  Patient denies any signs of bleeding.    Hypertension  This is a chronic problem. The current episode started more than 1 year ago. The problem is controlled. Associated symptoms include palpitations and peripheral edema. Pertinent negatives include no anxiety, blurred vision, chest pain, headaches, malaise/fatigue, neck pain, orthopnea, PND, shortness of breath or sweats. Risk factors for coronary artery disease include obesity, dyslipidemia and post-menopausal state. Current antihypertension treatment includes central alpha agonists, ACE inhibitors, beta blockers and diuretics. The current treatment provides significant improvement. Hypertensive end-organ damage includes heart failure. Identifiable causes of hypertension include sleep apnea.   Hyperlipidemia  This is a chronic problem. The current episode started more than 1 year ago. The problem is controlled. Exacerbating diseases include obesity. Pertinent negatives include no chest pain or shortness of breath. Current antihyperlipidemic treatment includes statins. Risk factors for coronary artery disease include hypertension, obesity, post-menopausal and dyslipidemia.    Congestive Heart Failure  Presents for follow-up visit. Associated symptoms include edema and palpitations. Pertinent negatives include no abdominal pain, chest pain, chest pressure, claudication, fatigue, muscle weakness, near-syncope, nocturia, orthopnea, paroxysmal nocturnal dyspnea, shortness of breath or unexpected weight change. The symptoms have been stable. Compliance with total regimen is 51-75%. Compliance with diet is 51-75%. Compliance with exercise is 51-75%. Compliance with medications is %.     I have reviewed and confirmed the accuracy of the ROS  CASSIDY Rodriguez    Objective     Current Outpatient Medications:     cloNIDine (CATAPRES) 0.1 MG tablet, Take 1 tablet by mouth Daily As Needed for High Blood Pressure., Disp: 30 tablet, Rfl: 11    clotrimazole (LOTRIMIN) 1 % cream, APPLY TO THE AFFECTED AND SURROUNDING AREAS OF SKIN BY TOPICAL ROUTE 2 TIMES PER DAY IN THE MORNING AND EVENING, Disp: , Rfl:     furosemide (LASIX) 40 MG tablet, Take 1 tablet by mouth Daily As Needed (daily as needed for increased shortness of breath, swelling and/or weight gain)., Disp: 90 tablet, Rfl: 3    gabapentin (NEURONTIN) 100 MG capsule, 1-3 at night, Disp: 90 capsule, Rfl: 0    hydroCHLOROthiazide (HYDRODIURIL) 50 MG tablet, TAKE ONE TABLET BY MOUTH ONCE EVERY DAY, Disp: 90 tablet, Rfl: 3    lisinopril (PRINIVIL,ZESTRIL) 40 MG tablet, Take 1 tablet by mouth Daily., Disp: 90 tablet, Rfl: 3    Loratadine 10 MG capsule, Take 1 capsule by mouth Daily., Disp: , Rfl:     metoprolol succinate XL (TOPROL-XL) 100 MG 24 hr tablet, Take 1 tablet by mouth Daily., Disp: , Rfl:     multivitamin with minerals (PRESERVISION AREDS PO), Daily., Disp: , Rfl:     nystatin (MYCOSTATIN) 298318 UNIT/GM cream, APPLY TO THE AFFECTED AREA(S) BY TOPICAL ROUTE 2 TIMES PER DAY, Disp: , Rfl:     nystatin (MYCOSTATIN) 384718 UNIT/GM powder, APPLY TO AFFECTED AREA TWICE A DAY FOR 10 DAYS, Disp: , Rfl:     omeprazole (priLOSEC) 20 MG  "capsule, Take 1 capsule by mouth Daily., Disp: , Rfl:     simvastatin (ZOCOR) 20 MG tablet, Take 1 tablet by mouth Every Night., Disp: , Rfl:     spironolactone (ALDACTONE) 50 MG tablet, Take 1 tablet by mouth Daily., Disp: 90 tablet, Rfl: 3  Vital Signs:   /73   Pulse 62   Ht 147.3 cm (58\")   Wt 81.2 kg (179 lb)   SpO2 97%   BMI 37.41 kg/m²     Vitals and nursing note reviewed.   Constitutional:       General: Not in acute distress.     Appearance: Normal and healthy appearance. Well-developed and not in distress. Obese. Not diaphoretic.   Eyes:      General: Lids are normal.         Right eye: No discharge.         Left eye: No discharge.      Conjunctiva/sclera: Conjunctivae normal.      Pupils: Pupils are equal, round, and reactive to light.   HENT:      Head: Normocephalic and atraumatic.      Jaw: There is normal jaw occlusion.      Right Ear: External ear normal.      Left Ear: External ear normal.      Nose: Nose normal.   Neck:      Thyroid: No thyromegaly.      Vascular: No carotid bruit, JVD or JVR. JVD normal.      Trachea: Trachea normal. No tracheal deviation.   Pulmonary:      Effort: Pulmonary effort is normal. No respiratory distress.      Breath sounds: Normal breath sounds. No decreased breath sounds. No wheezing. No rhonchi. No rales.   Chest:      Chest wall: Not tender to palpatation.   Cardiovascular:      PMI at left midclavicular line. Normal rate. Regular rhythm. Normal S1. Normal S2.       Murmurs: There is no murmur.      No gallop.  No click. No rub.   Pulses:     Intact distal pulses. No decreased pulses.   Edema:     Peripheral edema present.     Pretibial: bilateral trace edema of the pretibial area.     Ankle: bilateral trace edema of the ankle.     Feet: bilateral trace edema of the feet.  Abdominal:      General: Bowel sounds are normal. There is no distension.      Palpations: Abdomen is soft.      Tenderness: There is no abdominal tenderness.   Musculoskeletal: Normal " range of motion.         General: No tenderness or deformity.      Cervical back: Normal range of motion and neck supple. Skin:     General: Skin is warm and dry.      Coloration: Skin is not pale.      Findings: No erythema or rash.   Neurological:      General: No focal deficit present.      Mental Status: Alert, oriented to person, place, and time and oriented to person, place and time.   Psychiatric:         Attention and Perception: Attention and perception normal.         Mood and Affect: Mood and affect normal.         Speech: Speech normal.         Behavior: Behavior normal.         Thought Content: Thought content normal.         Cognition and Memory: Cognition and memory normal.         Judgment: Judgment normal.        Result Review :   The following data was reviewed by: CASSIDY Rodriguez on 4/4/2024:  Common labs          9/5/2023    11:02   Common Labs   Glucose 93    BUN 22    Creatinine 0.60    Sodium 138    Potassium 5.0    Chloride 102    Calcium 9.8    Radiology exam- bilateral lower extremity venous ultrasound 7/11/22 and cardiology exam- 2d echo 9/13/22    ECG 12 Lead    Date/Time: 4/4/2024 10:31 AM  Performed by: Kandis Snider APRN    Authorized by: Kandis Snider APRN  Comparison: compared with previous ECG from 6/27/2023  Similar to previous ECG  Rhythm: sinus rhythm  Rate: normal  BPM: 62  QRS axis: left    Clinical impression: abnormal EKG            Assessment and Plan    Diagnoses and all orders for this visit:    1. Essential hypertension (Primary)-blood pressure is well controlled. Continue spironolactone, HCTZ, lisinopril, clonidine and metoprolol succinate.  Monitor and record daily blood pressure. Report readings consistently higher than 130/80 or consistently lower than 100/60.     2. Mixed hyperlipidemia-management per PCP.  Continue simvastatin.    3. KATY treated with BiPAP-patient reports compliance with BiPAP.  Stable.    4. Class 2 severe obesity due to excess  calories with serious comorbidity and body mass index (BMI) of 37.0 to 37.9 in adult ([facility])- Class 2 Severe Obesity (BMI >=35 and <=39.9). Obesity-related health conditions include the following: obstructive sleep apnea, hypertension and dyslipidemias. Obesity is unchanged. BMI is is above average; no BMI management plan is appropriate. We discussed low calorie, low carb based diet program, portion control and increasing exercise. Referral to bariatric surgery weight loss program.     5.  Venous insufficiency- present on bilateral lower extremity venous ultrasound 7/11/2022.  Pt is following with Dr. Serafin Sher with vascular surgery.  Status post right lower extremity venous ablation on 11/14/2022. Left lower extremity 1/16/2023.     6. Chronic diastolic congestive heart failure (HCC)- NYHA class II.  Stage C.  Compensated.  Patient was unable to tolerate Entresto due to reported skin irritation and pruritus. Jardiance discontinued due to rash, candidiasis and pruritis. Reviewed signs and symptoms of CHF and what to report with the patient. Patient instructed to restrict sodium and weigh daily. Report weight gain of greater than 2 lbs overnight or 5 lbs in 1 week. Pt verbalized understanding of instructions and plan of care.  Continue spironolactone, lisinopril and metoprolol succinate.     Advance Care Planning   ACP discussion was held with the patient during this visit. Patient does not have an advance directive, information provided.       Follow Up   Return in about 6 months (around 10/4/2024) for Next scheduled follow up.  Patient was given instructions and counseling regarding her condition or for health maintenance advice. Please see specific information pulled into the AVS if appropriate.

## 2024-04-04 ENCOUNTER — OFFICE VISIT (OUTPATIENT)
Dept: CARDIOLOGY | Facility: CLINIC | Age: 74
End: 2024-04-04
Payer: MEDICARE

## 2024-04-04 ENCOUNTER — HOSPITAL ENCOUNTER (OUTPATIENT)
Dept: BONE DENSITY | Facility: HOSPITAL | Age: 74
Discharge: HOME | End: 2024-04-04
Payer: MEDICARE

## 2024-04-04 ENCOUNTER — HOSPITAL ENCOUNTER (OUTPATIENT)
Dept: MAMMOGRAPHY | Facility: HOSPITAL | Age: 74
Discharge: HOME | End: 2024-04-04
Payer: MEDICARE

## 2024-04-04 VITALS
WEIGHT: 179 LBS | SYSTOLIC BLOOD PRESSURE: 120 MMHG | HEIGHT: 58 IN | OXYGEN SATURATION: 97 % | HEART RATE: 62 BPM | DIASTOLIC BLOOD PRESSURE: 73 MMHG | BODY MASS INDEX: 37.57 KG/M2

## 2024-04-04 DIAGNOSIS — Z12.31 ENCOUNTER FOR SCREENING MAMMOGRAM FOR MALIGNANT NEOPLASM OF BREAST: ICD-10-CM

## 2024-04-04 DIAGNOSIS — G47.33 OSA TREATED WITH BIPAP: ICD-10-CM

## 2024-04-04 DIAGNOSIS — I10 ESSENTIAL HYPERTENSION: Primary | ICD-10-CM

## 2024-04-04 DIAGNOSIS — E78.2 MIXED HYPERLIPIDEMIA: ICD-10-CM

## 2024-04-04 DIAGNOSIS — Z78.0 ASYMPTOMATIC MENOPAUSAL STATE: ICD-10-CM

## 2024-04-04 DIAGNOSIS — I50.32 CHRONIC DIASTOLIC CONGESTIVE HEART FAILURE: ICD-10-CM

## 2024-04-04 DIAGNOSIS — E66.01 CLASS 2 SEVERE OBESITY DUE TO EXCESS CALORIES WITH SERIOUS COMORBIDITY AND BODY MASS INDEX (BMI) OF 37.0 TO 37.9 IN ADULT: ICD-10-CM

## 2024-04-04 DIAGNOSIS — I87.2 VENOUS INSUFFICIENCY: ICD-10-CM

## 2024-04-04 PROCEDURE — 77067 SCR MAMMO BI INCL CAD: CPT

## 2024-04-04 PROCEDURE — 77063 BREAST TOMOSYNTHESIS BI: CPT

## 2024-04-04 PROCEDURE — 77080 DXA BONE DENSITY AXIAL: CPT

## 2024-04-04 RX ORDER — CLONIDINE HYDROCHLORIDE 0.1 MG/1
0.1 TABLET ORAL DAILY PRN
Qty: 30 TABLET | Refills: 11 | Status: SHIPPED | OUTPATIENT
Start: 2024-04-04

## 2024-04-04 NOTE — PATIENT INSTRUCTIONS
Advance Care Planning and Advance Directives     You make decisions on a daily basis - decisions about where you want to live, your career, your home, your life. Perhaps one of the most important decisions you face is your choice for future medical care. Take time to talk with your family and your healthcare team and start planning today.  Advance Care Planning is a process that can help you:  Understand possible future healthcare decisions in light of your own experiences  Reflect on those decision in light of your goals and values  Discuss your decisions with those closest to you and the healthcare professionals that care for you  Make a plan by creating a document that reflects your wishes    Surrogate Decision Maker  In the event of a medical emergency, which has left you unable to communicate or to make your own decisions, you would need someone to make decisions for you.  It is important to discuss your preferences for medical treatment with this person while you are in good health.     Qualities of a surrogate decision maker:  Willing to take on this role and responsibility  Knows what you want for future medical care  Willing to follow your wishes even if they don't agree with them  Able to make difficult medical decisions under stressful circumstances    Advance Directives  These are legal documents you can create that will guide your healthcare team and decision maker(s) when needed. These documents can be stored in the electronic medical record.    Living Will - a legal document to guide your care if you have a terminal condition or a serious illness and are unable to communicate. States vary by statute in document names/types, but most forms may include one or more of the following:        -  Directions regarding life-prolonging treatments        -  Directions regarding artificially provided nutrition/hydration        -  Choosing a healthcare decision maker        -  Direction regarding organ/tissue  donation    Durable Power of  for Healthcare - this document names an -in-fact to make medical decisions for you, but it may also allow this person to make personal and financial decisions for you. Please seek the advice of an  if you need this type of document.    **Advance Directives are not required and no one may discriminate against you if you do not sign one.    Medical Orders  Many states allow specific forms/orders signed by your physician to record your wishes for medical treatment in your current state of health. This form, signed in personal communication with your physician, addresses resuscitation and other medical interventions that you may or may not want.      For more information or to schedule a time with a [facility] Advance Care Planning Facilitator contact: [email or web address] or call [phone] and someone will contact you directly.

## 2024-04-26 ENCOUNTER — TRANSCRIBE ORDERS (OUTPATIENT)
Dept: ADMINISTRATIVE | Facility: HOSPITAL | Age: 74
End: 2024-04-26
Payer: MEDICARE

## 2024-04-26 DIAGNOSIS — R10.812 LEFT UPPER QUADRANT ABDOMINAL TENDERNESS, REBOUND TENDERNESS PRESENCE NOT SPECIFIED: Primary | ICD-10-CM

## 2024-05-06 ENCOUNTER — OFFICE VISIT (OUTPATIENT)
Dept: NEUROSURGERY | Facility: CLINIC | Age: 74
End: 2024-05-06
Payer: MEDICARE

## 2024-05-06 VITALS — WEIGHT: 179 LBS | BODY MASS INDEX: 37.57 KG/M2 | HEIGHT: 58 IN

## 2024-05-06 DIAGNOSIS — Z78.9 NON-SMOKER: ICD-10-CM

## 2024-05-06 DIAGNOSIS — E66.01 CLASS 2 SEVERE OBESITY DUE TO EXCESS CALORIES WITH SERIOUS COMORBIDITY AND BODY MASS INDEX (BMI) OF 37.0 TO 37.9 IN ADULT: Primary | ICD-10-CM

## 2024-05-06 DIAGNOSIS — M50.30 DDD (DEGENERATIVE DISC DISEASE), CERVICAL: ICD-10-CM

## 2024-05-06 PROCEDURE — 99213 OFFICE O/P EST LOW 20 MIN: CPT | Performed by: NEUROLOGICAL SURGERY

## 2024-05-06 RX ORDER — GABAPENTIN 100 MG/1
100 CAPSULE ORAL 3 TIMES DAILY
Qty: 90 CAPSULE | Refills: 0 | Status: SHIPPED | OUTPATIENT
Start: 2024-05-06 | End: 2024-06-05

## 2024-06-14 ENCOUNTER — OFFICE VISIT (OUTPATIENT)
Dept: SURGERY | Facility: CLINIC | Age: 74
End: 2024-06-14
Payer: MEDICARE

## 2024-06-14 VITALS
HEIGHT: 58 IN | BODY MASS INDEX: 37.36 KG/M2 | WEIGHT: 178 LBS | SYSTOLIC BLOOD PRESSURE: 138 MMHG | DIASTOLIC BLOOD PRESSURE: 84 MMHG

## 2024-06-14 DIAGNOSIS — K80.20 SYMPTOMATIC CHOLELITHIASIS: Primary | ICD-10-CM

## 2024-06-14 DIAGNOSIS — E66.1 CLASS 2 DRUG-INDUCED OBESITY WITH BODY MASS INDEX (BMI) OF 37.0 TO 37.9 IN ADULT, UNSPECIFIED WHETHER SERIOUS COMORBIDITY PRESENT: ICD-10-CM

## 2024-06-14 RX ORDER — SODIUM CHLORIDE 9 MG/ML
40 INJECTION, SOLUTION INTRAVENOUS AS NEEDED
OUTPATIENT
Start: 2024-06-14

## 2024-06-14 RX ORDER — SODIUM CHLORIDE 0.9 % (FLUSH) 0.9 %
10 SYRINGE (ML) INJECTION AS NEEDED
OUTPATIENT
Start: 2024-06-14

## 2024-06-14 RX ORDER — ONDANSETRON 2 MG/ML
4 INJECTION INTRAMUSCULAR; INTRAVENOUS EVERY 6 HOURS PRN
OUTPATIENT
Start: 2024-06-14

## 2024-06-14 RX ORDER — CELECOXIB 100 MG/1
200 CAPSULE ORAL ONCE
OUTPATIENT
Start: 2024-06-14 | End: 2024-06-14

## 2024-06-14 RX ORDER — SODIUM CHLORIDE 0.9 % (FLUSH) 0.9 %
10 SYRINGE (ML) INJECTION EVERY 12 HOURS SCHEDULED
OUTPATIENT
Start: 2024-06-14

## 2024-06-14 RX ORDER — ACETAMINOPHEN 325 MG/1
650 TABLET ORAL ONCE
OUTPATIENT
Start: 2024-06-14 | End: 2024-06-14

## 2024-06-14 NOTE — PROGRESS NOTES
Office New Patient History and Physical:     Referring Provider: Charly Dominguez AP*  Primary Care Provider: Charly Dominguez APRN    Chief Complaint   Patient presents with    Cholelithiasis     PATIENT IS HERE FOR A FOLLOW UP FOR GALLSTONES IN THE GALLBLADDER       Subjective .       History of present illness:  Esha Boogie is a 74 y.o. female who presents for evaluation of symptomatic cholelithiasis.  She has been having epigastric abdominal pain and discomfort which is occasionally exacerbated by certain foods. She had a CT scan performed that showed gallstones. Additionally, she has a lipoma in the left upper quadrant abdominal wall which she has had for quite some time, which she believes is increasing in size.  She feels discomfort in the left upper quadrant as well.    History:  Past Medical History:   Diagnosis Date    Arthritis     Bleeding tendency     CHF (congestive heart failure)     GERD (gastroesophageal reflux disease)     Gout     Hyperlipidemia     Hypertension     Peripheral vascular disease     PONV (postoperative nausea and vomiting)     Sleep apnea     pt sleeps with a bipap machine   ,   Past Surgical History:   Procedure Laterality Date    BREAST BIOPSY Right     ENDOSCOPY  07/2023    HYSTERECTOMY      Partial    TOTAL HIP ARTHROPLASTY Bilateral     VARICOSE VEIN SURGERY Right 11/14/2022    Procedure: RIGHT SAPHENOUS VEIN RADIO FREQUENCY ABLATION;  Surgeon: Serafin Sher DO;  Location: [facility] OR 12;  Service: Vascular;  Laterality: Right;    VARICOSE VEIN SURGERY Left 01/16/2023    Procedure: LEFT SAPHENOUS VEIN RADIO FREQUENCY ABLATION;  Surgeon: Serafin Sher DO;  Location: [facility] HYBRID OR 12;  Service: Vascular;  Laterality: Left;   ,   Family History   Problem Relation Age of Onset    Stroke Mother     Cancer Father     Breast cancer Neg Hx    ,   Social History     Tobacco Use    Smoking status: Never    Smokeless tobacco: Never   Vaping Use    Vaping  status: Never Used   Substance Use Topics    Alcohol use: Never    Drug use: Never       Current Outpatient Medications:     cloNIDine (CATAPRES) 0.1 MG tablet, Take 1 tablet by mouth Daily As Needed for High Blood Pressure., Disp: 30 tablet, Rfl: 11    clotrimazole (LOTRIMIN) 1 % cream, APPLY TO THE AFFECTED AND SURROUNDING AREAS OF SKIN BY TOPICAL ROUTE 2 TIMES PER DAY IN THE MORNING AND EVENING, Disp: , Rfl:     furosemide (LASIX) 40 MG tablet, Take 1 tablet by mouth Daily As Needed (daily as needed for increased shortness of breath, swelling and/or weight gain)., Disp: 90 tablet, Rfl: 3    gabapentin (NEURONTIN) 100 MG capsule, 1-3 at night, Disp: 90 capsule, Rfl: 0    hydroCHLOROthiazide (HYDRODIURIL) 50 MG tablet, TAKE ONE TABLET BY MOUTH ONCE EVERY DAY, Disp: 90 tablet, Rfl: 3    lisinopril (PRINIVIL,ZESTRIL) 40 MG tablet, Take 1 tablet by mouth Daily., Disp: 90 tablet, Rfl: 3    Loratadine 10 MG capsule, Take 1 capsule by mouth Daily., Disp: , Rfl:     metoprolol succinate XL (TOPROL-XL) 100 MG 24 hr tablet, Take 1 tablet by mouth Daily., Disp: , Rfl:     multivitamin with minerals (PRESERVISION AREDS PO), Daily., Disp: , Rfl:     nystatin (MYCOSTATIN) 651487 UNIT/GM cream, APPLY TO THE AFFECTED AREA(S) BY TOPICAL ROUTE 2 TIMES PER DAY, Disp: , Rfl:     nystatin (MYCOSTATIN) 185275 UNIT/GM powder, APPLY TO AFFECTED AREA TWICE A DAY FOR 10 DAYS, Disp: , Rfl:     omeprazole (priLOSEC) 20 MG capsule, Take 1 capsule by mouth Daily., Disp: , Rfl:     simvastatin (ZOCOR) 20 MG tablet, Take 1 tablet by mouth Every Night., Disp: , Rfl:     spironolactone (ALDACTONE) 50 MG tablet, Take 1 tablet by mouth Daily., Disp: 90 tablet, Rfl: 3    gabapentin (NEURONTIN) 100 MG capsule, Take 1 capsule by mouth 3 (Three) Times a Day for 30 days., Disp: 90 capsule, Rfl: 0    Allergies:  Farxiga [dapagliflozin], Jardiance [empagliflozin], Entresto [sacubitril-valsartan], Latex, and Silicone      The following portions of the  "patient's history were reviewed and updated as appropriate: allergies, current medications, past family history, past medical history, past social history, past surgical history, and problem list.      Review of Systems  A comprehensive 14 point review of systems was performed and is negative unless otherwise noted      Objective   /84 (BP Location: Left arm, Patient Position: Sitting, Cuff Size: Large Adult)   Ht 147.3 cm (57.99\")   Wt 80.7 kg (178 lb)   BMI 37.21 kg/m²     BMI followup discussion/instruction with patient: continue with current weight loss program, educational material, exercise counseling, nutrition counseling, and Information on healthy weight added to patient's after visit summary.      Physical Exam  Constitutional:       Appearance: Normal appearance. She is normal weight.      Comments: Pleasant elderly female, in no acute distress. Normal development, obese body habitus, short stature. Well nourished   HENT:      Head: Normocephalic and atraumatic.      Right Ear: External ear normal.      Left Ear: External ear normal.      Ears:      Comments: Hearing intact     Nose: Nose normal.      Comments: Nares patent, no septal deviation, no drainage     Mouth/Throat:      Comments: Airway patent, dentition intact, mucus membranes moist  Eyes:      Extraocular Movements: Extraocular movements intact.      Conjunctiva/sclera: Conjunctivae normal.      Pupils: Pupils are equal, round, and reactive to light.      Comments: External eyelids grossly normal, vision intact, no scleral icterus   Neck:      Comments: Trachea midline  Cardiovascular:      Rate and Rhythm: Normal rate.      Comments: Normotensive, no JVD bilaterally  Pulmonary:      Effort: Pulmonary effort is normal.      Breath sounds: Normal breath sounds.      Comments: Normal respiratory rate  Abdominal:      Palpations: Abdomen is soft.      Comments: Non tender. No scars, hernias or masses.   Musculoskeletal:         General: " Normal range of motion.      Cervical back: Normal range of motion.      Comments: Strength intact. Ambulating without difficulty. Absent of trauma   Skin:     General: Skin is warm and dry.      Comments: Skin color is consistent with ethnicity   Neurological:      General: No focal deficit present.      Mental Status: She is alert and oriented to person, place, and time.   Psychiatric:         Mood and Affect: Mood normal.         Behavior: Behavior normal.         Thought Content: Thought content normal.         Judgment: Judgment normal.      Comments: Patient understands disease process and has decision making capacity.          Results:  Labs:  I personally reviewed all pertinent labs. No recent labs  Imaging: I personally reviewed all pertinent imaging studies.  CT scan of the abdomen and pelvis showed gallstones without inflammation.  Pathology:        Assessment & Plan   Diagnoses and all orders for this visit:    1. Symptomatic cholelithiasis (Primary)  -     Case Request; Standing  -     ECG 12 Lead; Future  -     CBC (No Diff); Future  -     Comprehensive Metabolic Panel; Future  -     ceFAZolin (ANCEF) 2,000 mg in sodium chloride 0.9 % 100 mL IVPB  -     acetaminophen (TYLENOL) tablet 650 mg  -     celecoxib (CeleBREX) capsule 200 mg  -     sodium chloride 0.9 % flush 10 mL  -     sodium chloride 0.9 % flush 10 mL  -     sodium chloride 0.9 % infusion 40 mL  -     ondansetron (ZOFRAN) injection 4 mg  -     Case Request    2. 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, unspecified whether serious comorbidity present    Other orders  -     Follow Anesthesia Guidelines / Protocol; Future  -     Follow Anesthesia Guidelines / Protocol; Standing  -     Verify NPO Status; Standing  -     Obtain Informed Consent; Standing  -     Clip Operative Site; Standing  -     Have Patient Void Prior to Procedure; Standing  -     Verify / Perform Chlorhexidine Skin Prep; Standing  -      Obtain Informed Consent; Future  -     Provide NPO Instructions to Patient; Future  -     Chlorhexidine Skin Prep; Future  -     Perform Betadine Skin Prep; Future  -     Notify Provider - Standard; Standing  -     Place Sequential Compression Device; Standing  -     Maintain Sequential Compression Device; Standing  -     Insert Peripheral IV; Standing  -     Saline Lock & Maintain IV Access; Standing      74-year-old female with symptomatic cholelithiasis.  She would like to proceed with surgery.    Plan for robotic laparoscopic cholecystectomy with intraoperative cholangiogram.    I discussed the risks, benefits and alternatives to cholecystectomy including risk of injury to surrounding structures specifically the bile duct, postoperative bile leaks, deep organ space and superficial skin infection, uncontrolled bleeding, the need for blood transfusion, the possibility of converting to open surgery, incisional hernias, ongoing pain, bowel habit changes, long term drain and wound care, and the need for more surgery or procedures in the future including endoscopy. Additionally, I counseled the patient on the risk of postoperative cardiopulmonary complications. I gave the patient a chance to ask any questions and answered them thoroughly. The patient understood the risks and was agreeable to proceeding to surgery. Consent was obtained from the patient.        Gary Davis MD, FACS  General Surgery  6/14/2024  10:59 CDT    Please note that portions of this note were completed with a voice recognition program.

## 2024-06-18 RX ORDER — SPIRONOLACTONE 50 MG/1
50 TABLET, FILM COATED ORAL DAILY
Qty: 90 TABLET | Refills: 3 | Status: SHIPPED | OUTPATIENT
Start: 2024-06-18

## 2024-06-18 RX ORDER — HYDROCHLOROTHIAZIDE 50 MG/1
50 TABLET ORAL DAILY
Qty: 90 TABLET | Refills: 3 | Status: SHIPPED | OUTPATIENT
Start: 2024-06-18

## 2024-06-18 RX ORDER — LISINOPRIL 40 MG/1
40 TABLET ORAL DAILY
Qty: 90 TABLET | Refills: 3 | Status: SHIPPED | OUTPATIENT
Start: 2024-06-18

## 2024-07-11 ENCOUNTER — TELEPHONE (OUTPATIENT)
Dept: SURGERY | Facility: CLINIC | Age: 74
End: 2024-07-11
Payer: MEDICARE

## 2024-07-22 ENCOUNTER — PRE-ADMISSION TESTING (OUTPATIENT)
Dept: PREADMISSION TESTING | Facility: HOSPITAL | Age: 74
End: 2024-07-22
Payer: MEDICARE

## 2024-07-22 VITALS
RESPIRATION RATE: 18 BRPM | DIASTOLIC BLOOD PRESSURE: 56 MMHG | BODY MASS INDEX: 35.73 KG/M2 | HEIGHT: 59 IN | HEART RATE: 83 BPM | SYSTOLIC BLOOD PRESSURE: 141 MMHG | WEIGHT: 177.25 LBS | OXYGEN SATURATION: 96 %

## 2024-07-22 DIAGNOSIS — K80.20 SYMPTOMATIC CHOLELITHIASIS: ICD-10-CM

## 2024-07-22 LAB
ALBUMIN SERPL-MCNC: 4.2 G/DL (ref 3.5–5.2)
ALBUMIN/GLOB SERPL: 1.4 G/DL
ALP SERPL-CCNC: 64 U/L (ref 39–117)
ALT SERPL W P-5'-P-CCNC: 20 U/L (ref 1–33)
ANION GAP SERPL CALCULATED.3IONS-SCNC: 10 MMOL/L (ref 5–15)
AST SERPL-CCNC: 18 U/L (ref 1–32)
BILIRUB SERPL-MCNC: 0.3 MG/DL (ref 0–1.2)
BUN SERPL-MCNC: 36 MG/DL (ref 8–23)
BUN/CREAT SERPL: 58.1 (ref 7–25)
CALCIUM SPEC-SCNC: 10.1 MG/DL (ref 8.6–10.5)
CHLORIDE SERPL-SCNC: 98 MMOL/L (ref 98–107)
CO2 SERPL-SCNC: 25 MMOL/L (ref 22–29)
CREAT SERPL-MCNC: 0.62 MG/DL (ref 0.57–1)
DEPRECATED RDW RBC AUTO: 42.3 FL (ref 37–54)
EGFRCR SERPLBLD CKD-EPI 2021: 93 ML/MIN/1.73
ERYTHROCYTE [DISTWIDTH] IN BLOOD BY AUTOMATED COUNT: 12.8 % (ref 12.3–15.4)
GLOBULIN UR ELPH-MCNC: 3 GM/DL
GLUCOSE SERPL-MCNC: 104 MG/DL (ref 65–99)
HCT VFR BLD AUTO: 40.6 % (ref 34–46.6)
HGB BLD-MCNC: 13.5 G/DL (ref 12–15.9)
MCH RBC QN AUTO: 29.9 PG (ref 26.6–33)
MCHC RBC AUTO-ENTMCNC: 33.3 G/DL (ref 31.5–35.7)
MCV RBC AUTO: 90 FL (ref 79–97)
PLATELET # BLD AUTO: 291 10*3/MM3 (ref 140–450)
PMV BLD AUTO: 11.3 FL (ref 6–12)
POTASSIUM SERPL-SCNC: 4.7 MMOL/L (ref 3.5–5.2)
PROT SERPL-MCNC: 7.2 G/DL (ref 6–8.5)
RBC # BLD AUTO: 4.51 10*6/MM3 (ref 3.77–5.28)
SODIUM SERPL-SCNC: 133 MMOL/L (ref 136–145)
WBC NRBC COR # BLD AUTO: 13.46 10*3/MM3 (ref 3.4–10.8)

## 2024-07-22 PROCEDURE — 85027 COMPLETE CBC AUTOMATED: CPT

## 2024-07-22 PROCEDURE — 93005 ELECTROCARDIOGRAM TRACING: CPT

## 2024-07-22 PROCEDURE — 36415 COLL VENOUS BLD VENIPUNCTURE: CPT

## 2024-07-22 PROCEDURE — 80053 COMPREHEN METABOLIC PANEL: CPT

## 2024-07-22 RX ORDER — TIRZEPATIDE 5 MG/.5ML
5 INJECTION, SOLUTION SUBCUTANEOUS WEEKLY
Status: ON HOLD | COMMUNITY
Start: 2024-07-11

## 2024-07-22 NOTE — DISCHARGE INSTRUCTIONS
Preparing for Surgery  Follow these instructions before the procedure:  Several days or weeks before your procedure  Medication(s) you need to stop 1 WEEK prior to surgery:  MOUNJARO      Ask your health care provider about:  Changing or stopping your regular medicines. This is especially important if you are taking diabetes medicines or blood thinners.  Taking medicines such as aspirin and ibuprofen. These medicines can thin your blood. Do not take these medicines unless your health care provider tells you to take them.  Taking over-the-counter medicines, vitamins, herbs, and supplements.    Contact your surgeon if you:  Develop a fever of more than 100.4°F (38°C) or other feelings of illness during the 48 hours before your surgery.  Have symptoms that get worse.  Have questions or concerns about your surgery.  If you are going home the same day of your surgery you will need to arrange for a responsible adult, age 18 years old or older, to drive you home from the hospital and stay with you for 24 hours. Verification of the  will be made prior to any procedure requiring sedation. You may not go home in a taxi or any form of public transportation by yourself.     Day before your procedure  Medication(s) you need to stop the day before your surgery:Lisinopril    24 hours before your procedure DO NOT drink alcoholic beverages or smoke.  24 hours before your procedure STOP taking Erectile Dysfunction medication (i.e.,Cialis, Viagra)   You may be asked to shower with a germ-killing soap.  Day of your procedure   You may take the following medication(s) the morning of surgery with a sip of water: Metoprolol, Omeprazole      8 hours before your procedure STOP all food, any dairy products, and full liquids. This includes hard candy, chewing gum or mints. This is extremely important to prevent serious complications.     Up to 2 hours before your scheduled arrival time, you may have clear liquids no cream, powder, or  pulp of any kind. Safe options are water, black coffee, plain tea, soda, Gatorade/Powerade, clear broth, apple juice.    2 hours before your scheduled arrival time, STOP drinking clear liquids.    You may need to take another shower with a germ-killing soap before you leave home in the morning. Do not use perfumes, colognes, or body lotions.  Wear comfortable loose-fitting clothing.  Remove all jewelry including body piercing and rings, dark colored nail polish, and make up prior to arrival at the hospital. Leave all valuables at home.   Bring your hearing aids if you rely on them.  Do not wear contact lenses. If you wear eyeglasses remember to bring a case to store them in while you are in surgery.  Do not use denture adhesives since you will be asked to remove them during your surgery.    You do not need to bring your home medications into the hospital.   Bring your sleep apnea device with you on the day of your surgery (if this applies to you).  If you wear portable oxygen, bring it with you.   If you are staying overnight, you may bring a bag of items you may need such as slippers, robe and a change of clothes for your discharge. You may want to leave these items in the car until you are ready for them since your family will take your belongings when you leave the pre-operative area.  Arrive at the hospital as scheduled by the office. You will be asked to arrive 2 hours prior to your surgery time in order to prepare for your procedure.  When you arrive at the hospital  Go to the registration desk located at the main entrance of the hospital.  After registration is completed, you will be given a beeper and a sticker sheet. Take the stickers to Outpatient Surgery and place in the tray at the end of the desk to notify the staff that you have arrived and registered.   Return to the lobby to wait. You are not always called back according to the time of arrival but rather the time your doctor will be ready.  When your  beeper lights up and vibrates proceed through the double doors, under the stairs, and a member of the Outpatient Surgery staff will escort you to your preoperative room.

## 2024-07-23 LAB
QT INTERVAL: 350 MS
QTC INTERVAL: 396 MS

## 2024-07-26 ENCOUNTER — ANESTHESIA EVENT (OUTPATIENT)
Dept: PERIOP | Facility: HOSPITAL | Age: 74
End: 2024-07-26
Payer: MEDICARE

## 2024-07-26 ENCOUNTER — APPOINTMENT (OUTPATIENT)
Dept: GENERAL RADIOLOGY | Facility: HOSPITAL | Age: 74
End: 2024-07-26
Payer: MEDICARE

## 2024-07-26 ENCOUNTER — HOSPITAL ENCOUNTER (INPATIENT)
Facility: HOSPITAL | Age: 74
LOS: 4 days | Discharge: HOME | End: 2024-07-30
Attending: SURGERY | Admitting: SURGERY
Payer: MEDICARE

## 2024-07-26 ENCOUNTER — ANESTHESIA (OUTPATIENT)
Dept: PERIOP | Facility: HOSPITAL | Age: 74
End: 2024-07-26
Payer: MEDICARE

## 2024-07-26 DIAGNOSIS — K80.20 SYMPTOMATIC CHOLELITHIASIS: ICD-10-CM

## 2024-07-26 DIAGNOSIS — K80.50 CHOLEDOCHOLITHIASIS: Primary | ICD-10-CM

## 2024-07-26 LAB
GLUCOSE BLDC GLUCOMTR-MCNC: 100 MG/DL (ref 70–130)
GLUCOSE BLDC GLUCOMTR-MCNC: 180 MG/DL (ref 70–130)

## 2024-07-26 PROCEDURE — 25010000002 DEXAMETHASONE PER 1 MG: Performed by: ANESTHESIOLOGY

## 2024-07-26 PROCEDURE — 25010000002 HYDROMORPHONE 1 MG/ML SOLUTION: Performed by: NURSE ANESTHETIST, CERTIFIED REGISTERED

## 2024-07-26 PROCEDURE — 25810000003 LACTATED RINGERS PER 1000 ML: Performed by: SURGERY

## 2024-07-26 PROCEDURE — 8E0W4CZ ROBOTIC ASSISTED PROCEDURE OF TRUNK REGION, PERCUTANEOUS ENDOSCOPIC APPROACH: ICD-10-PCS | Performed by: SURGERY

## 2024-07-26 PROCEDURE — 25010000002 PROPOFOL 10 MG/ML EMULSION: Performed by: NURSE ANESTHETIST, CERTIFIED REGISTERED

## 2024-07-26 PROCEDURE — 25010000002 FENTANYL CITRATE (PF) 50 MCG/ML SOLUTION: Performed by: ANESTHESIOLOGY

## 2024-07-26 PROCEDURE — 25010000002 HYDROMORPHONE PER 4 MG: Performed by: NURSE ANESTHETIST, CERTIFIED REGISTERED

## 2024-07-26 PROCEDURE — S2900 ROBOTIC SURGICAL SYSTEM: HCPCS | Performed by: SURGERY

## 2024-07-26 PROCEDURE — BF111ZZ FLUOROSCOPY OF BILIARY AND PANCREATIC DUCTS USING LOW OSMOLAR CONTRAST: ICD-10-PCS | Performed by: SURGERY

## 2024-07-26 PROCEDURE — 25010000002 CEFAZOLIN PER 500 MG: Performed by: SURGERY

## 2024-07-26 PROCEDURE — 47563 LAPARO CHOLECYSTECTOMY/GRAPH: CPT | Performed by: SURGERY

## 2024-07-26 PROCEDURE — 25010000002 ONDANSETRON PER 1 MG: Performed by: NURSE ANESTHETIST, CERTIFIED REGISTERED

## 2024-07-26 PROCEDURE — 0FT44ZZ RESECTION OF GALLBLADDER, PERCUTANEOUS ENDOSCOPIC APPROACH: ICD-10-PCS | Performed by: SURGERY

## 2024-07-26 PROCEDURE — 25010000002 KETOROLAC TROMETHAMINE PER 15 MG: Performed by: SURGERY

## 2024-07-26 PROCEDURE — 88304 TISSUE EXAM BY PATHOLOGIST: CPT | Performed by: SURGERY

## 2024-07-26 PROCEDURE — 25010000002 SUGAMMADEX 200 MG/2ML SOLUTION: Performed by: NURSE ANESTHETIST, CERTIFIED REGISTERED

## 2024-07-26 PROCEDURE — 82948 REAGENT STRIP/BLOOD GLUCOSE: CPT

## 2024-07-26 PROCEDURE — 25010000002 FENTANYL CITRATE (PF) 100 MCG/2ML SOLUTION: Performed by: NURSE ANESTHETIST, CERTIFIED REGISTERED

## 2024-07-26 PROCEDURE — 25010000002 CEFTRIAXONE PER 250 MG: Performed by: SURGERY

## 2024-07-26 PROCEDURE — 76000 FLUOROSCOPY <1 HR PHYS/QHP: CPT

## 2024-07-26 PROCEDURE — 25010000002 DROPERIDOL PER 5 MG: Performed by: ANESTHESIOLOGY

## 2024-07-26 PROCEDURE — 74300 X-RAY BILE DUCTS/PANCREAS: CPT

## 2024-07-26 RX ORDER — DROPERIDOL 2.5 MG/ML
0.62 INJECTION, SOLUTION INTRAMUSCULAR; INTRAVENOUS ONCE AS NEEDED
Status: COMPLETED | OUTPATIENT
Start: 2024-07-26 | End: 2024-07-26

## 2024-07-26 RX ORDER — PROPOFOL 10 MG/ML
VIAL (ML) INTRAVENOUS AS NEEDED
Status: DISCONTINUED | OUTPATIENT
Start: 2024-07-26 | End: 2024-07-26 | Stop reason: SURG

## 2024-07-26 RX ORDER — NALOXONE HCL 0.4 MG/ML
0.4 VIAL (ML) INJECTION AS NEEDED
Status: DISCONTINUED | OUTPATIENT
Start: 2024-07-26 | End: 2024-07-26 | Stop reason: HOSPADM

## 2024-07-26 RX ORDER — FENTANYL CITRATE 50 UG/ML
50 INJECTION, SOLUTION INTRAMUSCULAR; INTRAVENOUS
Status: COMPLETED | OUTPATIENT
Start: 2024-07-26 | End: 2024-07-26

## 2024-07-26 RX ORDER — SODIUM CHLORIDE, SODIUM LACTATE, POTASSIUM CHLORIDE, CALCIUM CHLORIDE 600; 310; 30; 20 MG/100ML; MG/100ML; MG/100ML; MG/100ML
9 INJECTION, SOLUTION INTRAVENOUS CONTINUOUS
Status: DISCONTINUED | OUTPATIENT
Start: 2024-07-26 | End: 2024-07-26

## 2024-07-26 RX ORDER — METOPROLOL SUCCINATE 100 MG/1
100 TABLET, EXTENDED RELEASE ORAL DAILY
Status: DISCONTINUED | OUTPATIENT
Start: 2024-07-27 | End: 2024-07-30 | Stop reason: HOSPADM

## 2024-07-26 RX ORDER — SODIUM CHLORIDE 0.9 % (FLUSH) 0.9 %
10 SYRINGE (ML) INJECTION AS NEEDED
Status: DISCONTINUED | OUTPATIENT
Start: 2024-07-26 | End: 2024-07-26 | Stop reason: HOSPADM

## 2024-07-26 RX ORDER — SODIUM CHLORIDE, SODIUM LACTATE, POTASSIUM CHLORIDE, CALCIUM CHLORIDE 600; 310; 30; 20 MG/100ML; MG/100ML; MG/100ML; MG/100ML
75 INJECTION, SOLUTION INTRAVENOUS CONTINUOUS
Status: DISPENSED | OUTPATIENT
Start: 2024-07-27 | End: 2024-07-28

## 2024-07-26 RX ORDER — BISACODYL 10 MG
10 SUPPOSITORY, RECTAL RECTAL DAILY PRN
Status: DISCONTINUED | OUTPATIENT
Start: 2024-07-26 | End: 2024-07-30 | Stop reason: HOSPADM

## 2024-07-26 RX ORDER — DEXTROSE MONOHYDRATE 25 G/50ML
12.5 INJECTION, SOLUTION INTRAVENOUS AS NEEDED
Status: DISCONTINUED | OUTPATIENT
Start: 2024-07-26 | End: 2024-07-26 | Stop reason: HOSPADM

## 2024-07-26 RX ORDER — ROCURONIUM BROMIDE 10 MG/ML
INJECTION, SOLUTION INTRAVENOUS AS NEEDED
Status: DISCONTINUED | OUTPATIENT
Start: 2024-07-26 | End: 2024-07-26 | Stop reason: SURG

## 2024-07-26 RX ORDER — LISINOPRIL 20 MG/1
40 TABLET ORAL DAILY
Status: DISCONTINUED | OUTPATIENT
Start: 2024-07-26 | End: 2024-07-30 | Stop reason: HOSPADM

## 2024-07-26 RX ORDER — ONDANSETRON 2 MG/ML
4 INJECTION INTRAMUSCULAR; INTRAVENOUS EVERY 6 HOURS PRN
Status: DISCONTINUED | OUTPATIENT
Start: 2024-07-26 | End: 2024-07-30 | Stop reason: HOSPADM

## 2024-07-26 RX ORDER — FLUMAZENIL 0.1 MG/ML
0.2 INJECTION INTRAVENOUS AS NEEDED
Status: DISCONTINUED | OUTPATIENT
Start: 2024-07-26 | End: 2024-07-26 | Stop reason: HOSPADM

## 2024-07-26 RX ORDER — ACETAMINOPHEN 325 MG/1
650 TABLET ORAL ONCE
Status: DISCONTINUED | OUTPATIENT
Start: 2024-07-26 | End: 2024-07-26 | Stop reason: HOSPADM

## 2024-07-26 RX ORDER — ENOXAPARIN SODIUM 100 MG/ML
40 INJECTION SUBCUTANEOUS
Status: DISCONTINUED | OUTPATIENT
Start: 2024-07-27 | End: 2024-07-30 | Stop reason: HOSPADM

## 2024-07-26 RX ORDER — AMOXICILLIN 250 MG
2 CAPSULE ORAL 2 TIMES DAILY PRN
Status: DISCONTINUED | OUTPATIENT
Start: 2024-07-26 | End: 2024-07-30 | Stop reason: HOSPADM

## 2024-07-26 RX ORDER — ONDANSETRON 2 MG/ML
INJECTION INTRAMUSCULAR; INTRAVENOUS AS NEEDED
Status: DISCONTINUED | OUTPATIENT
Start: 2024-07-26 | End: 2024-07-26 | Stop reason: SURG

## 2024-07-26 RX ORDER — PANTOPRAZOLE SODIUM 40 MG/1
40 TABLET, DELAYED RELEASE ORAL
Status: DISCONTINUED | OUTPATIENT
Start: 2024-07-27 | End: 2024-07-30 | Stop reason: HOSPADM

## 2024-07-26 RX ORDER — SODIUM CHLORIDE, SODIUM LACTATE, POTASSIUM CHLORIDE, CALCIUM CHLORIDE 600; 310; 30; 20 MG/100ML; MG/100ML; MG/100ML; MG/100ML
1000 INJECTION, SOLUTION INTRAVENOUS CONTINUOUS
Status: DISCONTINUED | OUTPATIENT
Start: 2024-07-26 | End: 2024-07-26 | Stop reason: HOSPADM

## 2024-07-26 RX ORDER — ONDANSETRON 2 MG/ML
4 INJECTION INTRAMUSCULAR; INTRAVENOUS EVERY 6 HOURS PRN
Status: DISCONTINUED | OUTPATIENT
Start: 2024-07-26 | End: 2024-07-26 | Stop reason: SDUPTHER

## 2024-07-26 RX ORDER — DEXAMETHASONE SODIUM PHOSPHATE 4 MG/ML
4 INJECTION, SOLUTION INTRA-ARTICULAR; INTRALESIONAL; INTRAMUSCULAR; INTRAVENOUS; SOFT TISSUE ONCE AS NEEDED
Status: COMPLETED | OUTPATIENT
Start: 2024-07-26 | End: 2024-07-26

## 2024-07-26 RX ORDER — FENTANYL CITRATE 50 UG/ML
25 INJECTION, SOLUTION INTRAMUSCULAR; INTRAVENOUS
Status: DISCONTINUED | OUTPATIENT
Start: 2024-07-26 | End: 2024-07-26 | Stop reason: HOSPADM

## 2024-07-26 RX ORDER — SODIUM CHLORIDE, SODIUM LACTATE, POTASSIUM CHLORIDE, CALCIUM CHLORIDE 600; 310; 30; 20 MG/100ML; MG/100ML; MG/100ML; MG/100ML
1000 INJECTION, SOLUTION INTRAVENOUS CONTINUOUS
Status: DISCONTINUED | OUTPATIENT
Start: 2024-07-26 | End: 2024-07-26

## 2024-07-26 RX ORDER — CETIRIZINE HYDROCHLORIDE 10 MG/1
10 TABLET ORAL DAILY
Status: DISCONTINUED | OUTPATIENT
Start: 2024-07-26 | End: 2024-07-30 | Stop reason: HOSPADM

## 2024-07-26 RX ORDER — HYDROCODONE BITARTRATE AND ACETAMINOPHEN 5; 325 MG/1; MG/1
1 TABLET ORAL EVERY 4 HOURS PRN
Status: DISCONTINUED | OUTPATIENT
Start: 2024-07-26 | End: 2024-07-26 | Stop reason: HOSPADM

## 2024-07-26 RX ORDER — HYDROCHLOROTHIAZIDE 25 MG/1
50 TABLET ORAL DAILY
Status: DISCONTINUED | OUTPATIENT
Start: 2024-07-26 | End: 2024-07-30 | Stop reason: HOSPADM

## 2024-07-26 RX ORDER — HYDROMORPHONE HYDROCHLORIDE 1 MG/ML
0.5 INJECTION, SOLUTION INTRAMUSCULAR; INTRAVENOUS; SUBCUTANEOUS
Status: DISCONTINUED | OUTPATIENT
Start: 2024-07-26 | End: 2024-07-26 | Stop reason: HOSPADM

## 2024-07-26 RX ORDER — GABAPENTIN 100 MG/1
100 CAPSULE ORAL NIGHTLY
Status: DISCONTINUED | OUTPATIENT
Start: 2024-07-26 | End: 2024-07-30 | Stop reason: HOSPADM

## 2024-07-26 RX ORDER — HYDROCODONE BITARTRATE AND ACETAMINOPHEN 10; 325 MG/1; MG/1
1 TABLET ORAL EVERY 4 HOURS PRN
Status: DISCONTINUED | OUTPATIENT
Start: 2024-07-26 | End: 2024-07-26 | Stop reason: HOSPADM

## 2024-07-26 RX ORDER — SODIUM CHLORIDE 0.9 % (FLUSH) 0.9 %
10 SYRINGE (ML) INJECTION EVERY 12 HOURS SCHEDULED
Status: DISCONTINUED | OUTPATIENT
Start: 2024-07-26 | End: 2024-07-26 | Stop reason: HOSPADM

## 2024-07-26 RX ORDER — SODIUM CHLORIDE 0.9 % (FLUSH) 0.9 %
10 SYRINGE (ML) INJECTION EVERY 12 HOURS SCHEDULED
Status: DISCONTINUED | OUTPATIENT
Start: 2024-07-26 | End: 2024-07-30 | Stop reason: HOSPADM

## 2024-07-26 RX ORDER — OXYCODONE HYDROCHLORIDE 5 MG/1
5 TABLET ORAL EVERY 4 HOURS PRN
Status: DISCONTINUED | OUTPATIENT
Start: 2024-07-26 | End: 2024-07-30 | Stop reason: HOSPADM

## 2024-07-26 RX ORDER — LIDOCAINE HYDROCHLORIDE 10 MG/ML
0.5 INJECTION, SOLUTION EPIDURAL; INFILTRATION; INTRACAUDAL; PERINEURAL ONCE AS NEEDED
Status: DISCONTINUED | OUTPATIENT
Start: 2024-07-26 | End: 2024-07-26 | Stop reason: HOSPADM

## 2024-07-26 RX ORDER — POLYETHYLENE GLYCOL 3350 17 G/17G
17 POWDER, FOR SOLUTION ORAL DAILY PRN
Status: DISCONTINUED | OUTPATIENT
Start: 2024-07-26 | End: 2024-07-30 | Stop reason: HOSPADM

## 2024-07-26 RX ORDER — SODIUM CHLORIDE 0.9 % (FLUSH) 0.9 %
10 SYRINGE (ML) INJECTION AS NEEDED
Status: DISCONTINUED | OUTPATIENT
Start: 2024-07-26 | End: 2024-07-30 | Stop reason: HOSPADM

## 2024-07-26 RX ORDER — FENTANYL CITRATE 50 UG/ML
INJECTION, SOLUTION INTRAMUSCULAR; INTRAVENOUS AS NEEDED
Status: DISCONTINUED | OUTPATIENT
Start: 2024-07-26 | End: 2024-07-26 | Stop reason: SURG

## 2024-07-26 RX ORDER — LIDOCAINE HYDROCHLORIDE 20 MG/ML
INJECTION, SOLUTION EPIDURAL; INFILTRATION; INTRACAUDAL; PERINEURAL AS NEEDED
Status: DISCONTINUED | OUTPATIENT
Start: 2024-07-26 | End: 2024-07-26 | Stop reason: SURG

## 2024-07-26 RX ORDER — SODIUM CHLORIDE 9 MG/ML
40 INJECTION, SOLUTION INTRAVENOUS AS NEEDED
Status: DISCONTINUED | OUTPATIENT
Start: 2024-07-26 | End: 2024-07-26 | Stop reason: HOSPADM

## 2024-07-26 RX ORDER — CELECOXIB 200 MG/1
200 CAPSULE ORAL ONCE
Status: DISCONTINUED | OUTPATIENT
Start: 2024-07-26 | End: 2024-07-26 | Stop reason: HOSPADM

## 2024-07-26 RX ORDER — ONDANSETRON 2 MG/ML
4 INJECTION INTRAMUSCULAR; INTRAVENOUS ONCE AS NEEDED
Status: DISCONTINUED | OUTPATIENT
Start: 2024-07-26 | End: 2024-07-26 | Stop reason: HOSPADM

## 2024-07-26 RX ORDER — HYDROMORPHONE HYDROCHLORIDE 1 MG/ML
0.5 INJECTION, SOLUTION INTRAMUSCULAR; INTRAVENOUS; SUBCUTANEOUS EVERY 4 HOURS PRN
Status: DISCONTINUED | OUTPATIENT
Start: 2024-07-26 | End: 2024-07-28

## 2024-07-26 RX ORDER — LABETALOL HYDROCHLORIDE 5 MG/ML
5 INJECTION, SOLUTION INTRAVENOUS
Status: DISCONTINUED | OUTPATIENT
Start: 2024-07-26 | End: 2024-07-26 | Stop reason: HOSPADM

## 2024-07-26 RX ORDER — IBUPROFEN 600 MG/1
600 TABLET ORAL EVERY 6 HOURS PRN
Status: DISCONTINUED | OUTPATIENT
Start: 2024-07-26 | End: 2024-07-26 | Stop reason: HOSPADM

## 2024-07-26 RX ORDER — BISACODYL 5 MG/1
5 TABLET, DELAYED RELEASE ORAL DAILY PRN
Status: DISCONTINUED | OUTPATIENT
Start: 2024-07-26 | End: 2024-07-30 | Stop reason: HOSPADM

## 2024-07-26 RX ORDER — SODIUM CHLORIDE 9 MG/ML
40 INJECTION, SOLUTION INTRAVENOUS AS NEEDED
Status: DISCONTINUED | OUTPATIENT
Start: 2024-07-26 | End: 2024-07-30 | Stop reason: HOSPADM

## 2024-07-26 RX ORDER — SODIUM CHLORIDE 0.9 % (FLUSH) 0.9 %
3 SYRINGE (ML) INJECTION AS NEEDED
Status: DISCONTINUED | OUTPATIENT
Start: 2024-07-26 | End: 2024-07-26 | Stop reason: HOSPADM

## 2024-07-26 RX ORDER — KETOROLAC TROMETHAMINE 30 MG/ML
15 INJECTION, SOLUTION INTRAMUSCULAR; INTRAVENOUS EVERY 6 HOURS
Status: DISPENSED | OUTPATIENT
Start: 2024-07-26 | End: 2024-07-29

## 2024-07-26 RX ORDER — SPIRONOLACTONE 50 MG/1
50 TABLET, FILM COATED ORAL DAILY
Status: DISCONTINUED | OUTPATIENT
Start: 2024-07-26 | End: 2024-07-30 | Stop reason: HOSPADM

## 2024-07-26 RX ADMIN — FENTANYL CITRATE 100 MCG: 50 INJECTION, SOLUTION INTRAMUSCULAR; INTRAVENOUS at 14:25

## 2024-07-26 RX ADMIN — PROPOFOL 150 MG: 10 INJECTION, EMULSION INTRAVENOUS at 14:26

## 2024-07-26 RX ADMIN — CEFTRIAXONE SODIUM 2000 MG: 2 INJECTION, POWDER, FOR SOLUTION INTRAMUSCULAR; INTRAVENOUS at 20:25

## 2024-07-26 RX ADMIN — CEFAZOLIN 2000 MG: 2 INJECTION, POWDER, FOR SOLUTION INTRAMUSCULAR; INTRAVENOUS at 14:35

## 2024-07-26 RX ADMIN — ROCURONIUM 50 MG: 50 INJECTION, SOLUTION INTRAVENOUS at 14:26

## 2024-07-26 RX ADMIN — GABAPENTIN 100 MG: 100 CAPSULE ORAL at 20:28

## 2024-07-26 RX ADMIN — SUGAMMADEX 200 MG: 100 INJECTION, SOLUTION INTRAVENOUS at 15:54

## 2024-07-26 RX ADMIN — LIDOCAINE HYDROCHLORIDE 60 MG: 20 INJECTION, SOLUTION EPIDURAL; INFILTRATION; INTRACAUDAL; PERINEURAL at 14:26

## 2024-07-26 RX ADMIN — KETOROLAC TROMETHAMINE 15 MG: 30 INJECTION, SOLUTION INTRAMUSCULAR; INTRAVENOUS at 19:05

## 2024-07-26 RX ADMIN — SODIUM CHLORIDE, POTASSIUM CHLORIDE, SODIUM LACTATE AND CALCIUM CHLORIDE 1000 ML: 600; 310; 30; 20 INJECTION, SOLUTION INTRAVENOUS at 09:45

## 2024-07-26 RX ADMIN — Medication 10 ML: at 20:28

## 2024-07-26 RX ADMIN — DROPERIDOL 0.62 MG: 2.5 INJECTION, SOLUTION INTRAMUSCULAR; INTRAVENOUS at 16:19

## 2024-07-26 RX ADMIN — SODIUM CHLORIDE, POTASSIUM CHLORIDE, SODIUM LACTATE AND CALCIUM CHLORIDE 1000 ML: 600; 310; 30; 20 INJECTION, SOLUTION INTRAVENOUS at 18:05

## 2024-07-26 RX ADMIN — DEXAMETHASONE SODIUM PHOSPHATE 4 MG: 4 INJECTION INTRA-ARTICULAR; INTRALESIONAL; INTRAMUSCULAR; INTRAVENOUS; SOFT TISSUE at 13:42

## 2024-07-26 RX ADMIN — FENTANYL CITRATE 50 MCG: 50 INJECTION, SOLUTION INTRAMUSCULAR; INTRAVENOUS at 16:27

## 2024-07-26 RX ADMIN — HYDROMORPHONE HYDROCHLORIDE 0.5 MG: 1 INJECTION, SOLUTION INTRAMUSCULAR; INTRAVENOUS; SUBCUTANEOUS at 17:00

## 2024-07-26 RX ADMIN — HYDROMORPHONE HYDROCHLORIDE 1 MG: 1 INJECTION, SOLUTION INTRAMUSCULAR; INTRAVENOUS; SUBCUTANEOUS at 15:54

## 2024-07-26 RX ADMIN — OXYCODONE HYDROCHLORIDE 5 MG: 5 TABLET ORAL at 19:06

## 2024-07-26 RX ADMIN — FENTANYL CITRATE 50 MCG: 50 INJECTION, SOLUTION INTRAMUSCULAR; INTRAVENOUS at 16:38

## 2024-07-26 RX ADMIN — ONDANSETRON 4 MG: 2 INJECTION INTRAMUSCULAR; INTRAVENOUS at 15:52

## 2024-07-26 NOTE — H&P
Office New Patient History and Physical:      Referring Provider: Charly Dominguez AP*  Primary Care Provider: Charly Dominguez APRN          Chief Complaint   Patient presents with    Cholelithiasis       PATIENT IS HERE FOR A FOLLOW UP FOR GALLSTONES IN THE GALLBLADDER            Subjective  .         History of present illness:  Esha Boogie is a 74 y.o. female who presents for evaluation of symptomatic cholelithiasis.  She has been having epigastric abdominal pain and discomfort which is occasionally exacerbated by certain foods. She had a CT scan performed that showed gallstones. Additionally, she has a lipoma in the left upper quadrant abdominal wall which she has had for quite some time, which she believes is increasing in size.  She feels discomfort in the left upper quadrant as well.     History:  Medical History        Past Medical History:   Diagnosis Date    Arthritis      Bleeding tendency      CHF (congestive heart failure)      GERD (gastroesophageal reflux disease)      Gout      Hyperlipidemia      Hypertension      Peripheral vascular disease      PONV (postoperative nausea and vomiting)      Sleep apnea       pt sleeps with a bipap machine      ,   Surgical History         Past Surgical History:   Procedure Laterality Date    BREAST BIOPSY Right      ENDOSCOPY   07/2023    HYSTERECTOMY         Partial    TOTAL HIP ARTHROPLASTY Bilateral      VARICOSE VEIN SURGERY Right 11/14/2022     Procedure: RIGHT SAPHENOUS VEIN RADIO FREQUENCY ABLATION;  Surgeon: Serafin Sher DO;  Location: [facility] HYBRID OR 12;  Service: Vascular;  Laterality: Right;    VARICOSE VEIN SURGERY Left 01/16/2023     Procedure: LEFT SAPHENOUS VEIN RADIO FREQUENCY ABLATION;  Surgeon: Serafin Sher DO;  Location:  [facility] HYBRID OR 12;  Service: Vascular;  Laterality: Left;      ,         Family History   Problem Relation Age of Onset    Stroke Mother      Cancer Father      Breast cancer Neg Hx     ,   Social  History   Social History           Tobacco Use    Smoking status: Never    Smokeless tobacco: Never   Vaping Use    Vaping status: Never Used   Substance Use Topics    Alcohol use: Never    Drug use: Never           Current Medications      Current Outpatient Medications:     cloNIDine (CATAPRES) 0.1 MG tablet, Take 1 tablet by mouth Daily As Needed for High Blood Pressure., Disp: 30 tablet, Rfl: 11    clotrimazole (LOTRIMIN) 1 % cream, APPLY TO THE AFFECTED AND SURROUNDING AREAS OF SKIN BY TOPICAL ROUTE 2 TIMES PER DAY IN THE MORNING AND EVENING, Disp: , Rfl:     furosemide (LASIX) 40 MG tablet, Take 1 tablet by mouth Daily As Needed (daily as needed for increased shortness of breath, swelling and/or weight gain)., Disp: 90 tablet, Rfl: 3    gabapentin (NEURONTIN) 100 MG capsule, 1-3 at night, Disp: 90 capsule, Rfl: 0    hydroCHLOROthiazide (HYDRODIURIL) 50 MG tablet, TAKE ONE TABLET BY MOUTH ONCE EVERY DAY, Disp: 90 tablet, Rfl: 3    lisinopril (PRINIVIL,ZESTRIL) 40 MG tablet, Take 1 tablet by mouth Daily., Disp: 90 tablet, Rfl: 3    Loratadine 10 MG capsule, Take 1 capsule by mouth Daily., Disp: , Rfl:     metoprolol succinate XL (TOPROL-XL) 100 MG 24 hr tablet, Take 1 tablet by mouth Daily., Disp: , Rfl:     multivitamin with minerals (PRESERVISION AREDS PO), Daily., Disp: , Rfl:     nystatin (MYCOSTATIN) 977946 UNIT/GM cream, APPLY TO THE AFFECTED AREA(S) BY TOPICAL ROUTE 2 TIMES PER DAY, Disp: , Rfl:     nystatin (MYCOSTATIN) 079780 UNIT/GM powder, APPLY TO AFFECTED AREA TWICE A DAY FOR 10 DAYS, Disp: , Rfl:     omeprazole (priLOSEC) 20 MG capsule, Take 1 capsule by mouth Daily., Disp: , Rfl:     simvastatin (ZOCOR) 20 MG tablet, Take 1 tablet by mouth Every Night., Disp: , Rfl:     spironolactone (ALDACTONE) 50 MG tablet, Take 1 tablet by mouth Daily., Disp: 90 tablet, Rfl: 3    gabapentin (NEURONTIN) 100 MG capsule, Take 1 capsule by mouth 3 (Three) Times a Day for 30 days., Disp: 90 capsule, Rfl: 0       "  Allergies:  Farxiga [dapagliflozin], Jardiance [empagliflozin], Entresto [sacubitril-valsartan], Latex, and Silicone        The following portions of the patient's history were reviewed and updated as appropriate: allergies, current medications, past family history, past medical history, past social history, past surgical history, and problem list.        Review of Systems  A comprehensive 14 point review of systems was performed and is negative unless otherwise noted              Objective  /84 (BP Location: Left arm, Patient Position: Sitting, Cuff Size: Large Adult)   Ht 147.3 cm (57.99\")   Wt 80.7 kg (178 lb)   BMI 37.21 kg/m²      BMI followup discussion/instruction with patient: continue with current weight loss program, educational material, exercise counseling, nutrition counseling, and Information on healthy weight added to patient's after visit summary.        Physical Exam  Constitutional:       Appearance: Normal appearance. She is normal weight.      Comments: Pleasant elderly female, in no acute distress. Normal development, obese body habitus, short stature. Well nourished   HENT:      Head: Normocephalic and atraumatic.      Right Ear: External ear normal.      Left Ear: External ear normal.      Ears:      Comments: Hearing intact     Nose: Nose normal.      Comments: Nares patent, no septal deviation, no drainage     Mouth/Throat:      Comments: Airway patent, dentition intact, mucus membranes moist  Eyes:      Extraocular Movements: Extraocular movements intact.      Conjunctiva/sclera: Conjunctivae normal.      Pupils: Pupils are equal, round, and reactive to light.      Comments: External eyelids grossly normal, vision intact, no scleral icterus   Neck:      Comments: Trachea midline  Cardiovascular:      Rate and Rhythm: Normal rate.      Comments: Normotensive, no JVD bilaterally  Pulmonary:      Effort: Pulmonary effort is normal.      Breath sounds: Normal breath sounds.      " Comments: Normal respiratory rate  Abdominal:      Palpations: Abdomen is soft.      Comments: Non tender. No scars, hernias or masses.   Musculoskeletal:         General: Normal range of motion.      Cervical back: Normal range of motion.      Comments: Strength intact. Ambulating without difficulty. Absent of trauma   Skin:     General: Skin is warm and dry.      Comments: Skin color is consistent with ethnicity   Neurological:      General: No focal deficit present.      Mental Status: She is alert and oriented to person, place, and time.   Psychiatric:         Mood and Affect: Mood normal.         Behavior: Behavior normal.         Thought Content: Thought content normal.         Judgment: Judgment normal.      Comments: Patient understands disease process and has decision making capacity.             Results:  Labs:  I personally reviewed all pertinent labs. No recent labs  Imaging: I personally reviewed all pertinent imaging studies.  CT scan of the abdomen and pelvis showed gallstones without inflammation.  Pathology:                Assessment & Plan  Diagnoses and all orders for this visit:     1. Symptomatic cholelithiasis (Primary)  -     Case Request; Standing  -     ECG 12 Lead; Future  -     CBC (No Diff); Future  -     Comprehensive Metabolic Panel; Future  -     ceFAZolin (ANCEF) 2,000 mg in sodium chloride 0.9 % 100 mL IVPB  -     acetaminophen (TYLENOL) tablet 650 mg  -     celecoxib (CeleBREX) capsule 200 mg  -     sodium chloride 0.9 % flush 10 mL  -     sodium chloride 0.9 % flush 10 mL  -     sodium chloride 0.9 % infusion 40 mL  -     ondansetron (ZOFRAN) injection 4 mg  -     Case Request     2. 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, unspecified whether serious comorbidity present     Other orders  -     Follow Anesthesia Guidelines / Protocol; Future  -     Follow Anesthesia Guidelines / Protocol; Standing  -     Verify NPO Status; Standing  -      Obtain Informed Consent; Standing  -     Clip Operative Site; Standing  -     Have Patient Void Prior to Procedure; Standing  -     Verify / Perform Chlorhexidine Skin Prep; Standing  -     Obtain Informed Consent; Future  -     Provide NPO Instructions to Patient; Future  -     Chlorhexidine Skin Prep; Future  -     Perform Betadine Skin Prep; Future  -     Notify Provider - Standard; Standing  -     Place Sequential Compression Device; Standing  -     Maintain Sequential Compression Device; Standing  -     Insert Peripheral IV; Standing  -     Saline Lock & Maintain IV Access; Standing        74-year-old female with symptomatic cholelithiasis.  She would like to proceed with surgery.     Plan for robotic laparoscopic cholecystectomy with intraoperative cholangiogram.     I discussed the risks, benefits and alternatives to cholecystectomy including risk of injury to surrounding structures specifically the bile duct, postoperative bile leaks, deep organ space and superficial skin infection, uncontrolled bleeding, the need for blood transfusion, the possibility of converting to open surgery, incisional hernias, ongoing pain, bowel habit changes, long term drain and wound care, and the need for more surgery or procedures in the future including endoscopy. Additionally, I counseled the patient on the risk of postoperative cardiopulmonary complications. I gave the patient a chance to ask any questions and answered them thoroughly. The patient understood the risks and was agreeable to proceeding to surgery. Consent was obtained from the patient.

## 2024-07-26 NOTE — ANESTHESIA PROCEDURE NOTES
Airway  Date/Time: 7/26/2024 2:29 PM  Airway not difficult    General Information and Staff    Patient location during procedure: OR  CRNA/CAA: Herbert Vanessa CRNA    Indications and Patient Condition  Indications for airway management: airway protection    Preoxygenated: yes  Mask difficulty assessment: 1 - vent by mask    Final Airway Details  Final airway type: endotracheal airway      Successful airway: ETT  Cuffed: yes   Successful intubation technique: direct laryngoscopy  Facilitating devices/methods: intubating stylet  Endotracheal tube insertion site: oral  Blade: Vielka  Blade size: 3.5  ETT size (mm): 7.0  Cormack-Lehane Classification: grade I - full view of glottis  Placement verified by: chest auscultation and capnometry   Measured from: teeth  ETT/EBT  to teeth (cm): 22  Number of attempts at approach: 1  Assessment: lips, teeth, and gum same as pre-op and atraumatic intubation

## 2024-07-26 NOTE — ANESTHESIA PREPROCEDURE EVALUATION
Anesthesia Evaluation     Patient summary reviewed   history of anesthetic complications:  PONV  NPO Solid Status: > 8 hours             Airway   Mallampati: II  Dental      Pulmonary    (+) ,sleep apnea  (-) COPD, asthma, not a smoker  Cardiovascular   Exercise tolerance: good (4-7 METS)    (+) hypertension, CHF Diastolic >=55%, hyperlipidemia  (-) pacemaker, past MI, angina, cardiac stents      Neuro/Psych  (-) seizures, TIA, CVA  GI/Hepatic/Renal/Endo    (+) obesity, GERD, diabetes mellitus  (-) liver disease, no renal disease    Musculoskeletal     Abdominal    Substance History      OB/GYN          Other                      Anesthesia Plan    ASA 2     general     intravenous induction     Anesthetic plan, risks, benefits, and alternatives have been provided, discussed and informed consent has been obtained with: patient.    CODE STATUS:

## 2024-07-26 NOTE — ANESTHESIA POSTPROCEDURE EVALUATION
Patient: Esha Boogie    Procedure Summary       Date: 07/26/24 Room / Location:  [facility] /  [facility]    Anesthesia Start: 1422 Anesthesia Stop: 1603    Procedure: LAPAROSCOPIC CHOLECYSTECTOMY INTRAOPERATIVE CHOLANGIOGRAM WITH StartMeINCI ROBOT (Abdomen) Diagnosis:       Symptomatic cholelithiasis      (Symptomatic cholelithiasis [K80.20])    Surgeons: Gary Davis MD Provider: Jorgito Car CRNA    Anesthesia Type: general ASA Status: 2            Anesthesia Type: general    Vitals  Vitals Value Taken Time   /53 07/26/24 1643   Temp 98.8 °F (37.1 °C) 07/26/24 1602   Pulse 92 07/26/24 1652   Resp 14 07/26/24 1630   SpO2 96 % 07/26/24 1652   Vitals shown include unfiled device data.        Post Anesthesia Care and Evaluation      Comments: Discharged per PACU Criteria

## 2024-07-26 NOTE — OP NOTE
PREOPERATIVE DIAGNOSIS: Symptomatic cholelithiasis        POSTOPERATIVE DIAGNOSIS: Symptomatic cholelithiasis, choledocholithiasis        PROCEDURE PERFORMED: Robotic laparoscopic cholecystectomy with intraoperative cholangiogram        SURGEON: Gary Davis MD        ASSISTANT:  Gary Holland CST, CST        SPECIMENS: Gallbladder for permanent        ANESTHESIA: General tracheal anesthesia with bilateral tap block        BLOOD LOSS:  5 mL    FLUIDS: 1000 mL        WOUND CLASSIFICATION: Class 3, contaminated        FINDINGS:   Moderately distended and inflamed gallbladder.  Critical view of safety obtained with a solitary cystic duct and a solitary cystic artery. Top down dissection. Normal biliary anatomy.  Cholangiogram showed numerous filling defects in the distal common bile duct. Excellent hemostasis.         INDICATIONS:   Esha Boogie is a 74 y.o. female with symptomatic cholelithiasis        DESCRIPTION OF PROCEDURE:      Informed consent was obtained. The patient was taken to the operating room and placed on the operating table in the supine position. Bilateral SCDs were placed. General anesthesia was administered and the patient was intubated without difficulty. Ancef 2 gm was administered for preoperative antibiotics. The abdomen was prepped and draped in the usual fashion.  A full surgical timeout was performed verifying the correct patient, correct procedure and correct site for surgery.     Local anesthesia was infiltrated into the left upper quadrant at [place].  A small incision was made and a Veress needle was inserted with 2 satisfactory clicks.  An excellent saline drop test confirmed correct intra-abdominal placement.  Pneumoperitoneum was initiated to 12 mmHg.  Patient was positioned in reverse Trendelenburg.  Local anesthesia was injected in the left lower hypogastrium.  An 8 mm robotic trocar was inserted under direct visualization and Optiview fashion.  The abdomen was entered  safely.  A bilateral tap block was performed using 10 mL of 1% ropivacaine and 100 mcg of Precedex and 100 cc of injectable saline.  Local was injected at the usual 3 additional trocar sites transversely across the abdomen.  Incisions were made and 3 more robotic trocars were inserted under direct visualization.     The robot was docked and instruments were placed.  The gallbladder was minimally inflamed and mildly distended. The gallbladder was retracted cephalad and the peritoneum was incised using hook cautery.  Careful dissection was undertaken in the cystohepatic triangle. The dissection was of moderate difficulty. There was normal biliary anatomy.  A a top-down dissection was performed and a critical view of safety was obtained of a solitary cystic duct and a solitary cystic artery was obtained.  2 clips were placed proximally and 1 clip placed distally on the cystic artery, and this was divided using cautery.  1 clip was placed high on the cystic duct at the neck of the gallbladder.  An additional 5 mm trocar was placed in the right subcostal location for the cholangiogram clamp.  A cholangiogram catheter was assembled and inserted into the cystic duct.  This was clamped in place.  A cholangiogram was performed which showed a tortuous cystic duct with retrograde filling of the common hepatic duct and the hepatic radicles.  Contrast migrated antegrade through the common bile duct which showed numerous distal common bile duct filling defects.  Contrast did migrate into the duodenum and reflux into the pancreatic duct.  The catheter was flushed and removed.  The cystic duct was ligated using a single PDS Endo loop. Using an Endo Catch bag, the gallbladder was extracted through the left lower quadrant trocar site.  The fascia of the port site was closed with a 0 Vicryl suture in a figure of eight fashion using a transfascial suture passer. There was meticulous hemostasis.  The remainder of the trocars were  removed under direct visualization and pneumoperitoneum was released.  The incisions were irrigated using saline and closed with 4-0 Monocryl suture.  The incisions were dressed with Mastisol and Steri-Strips.     At the conclusion of the case all needle, sharp and sponge counts were correct times two at the completion of the procedure. The patient awoken from anesthesia, extubated in the operating room and was transported to the PACU in stable condition without any immediate complications.

## 2024-07-26 NOTE — ANESTHESIA POSTPROCEDURE EVALUATION
Patient: Esha Boogie    Procedure Summary       Date: 07/26/24 Room / Location:  [facility] /  [facility]    Anesthesia Start: 1422 Anesthesia Stop: 1603    Procedure: LAPAROSCOPIC CHOLECYSTECTOMY INTRAOPERATIVE CHOLANGIOGRAM WITH PlatizaINCI ROBOT (Abdomen) Diagnosis:       Symptomatic cholelithiasis      (Symptomatic cholelithiasis [K80.20])    Surgeons: Gary Davis MD Provider: Jorgito Car CRNA    Anesthesia Type: general ASA Status: 2            Anesthesia Type: general    Vitals  Vitals Value Taken Time   /53 07/26/24 1643   Temp 98.8 °F (37.1 °C) 07/26/24 1602   Pulse 91 07/26/24 1651   Resp 14 07/26/24 1630   SpO2 97 % 07/26/24 1651   Vitals shown include unfiled device data.        Anesthesia Post Evaluation

## 2024-07-27 ENCOUNTER — APPOINTMENT (OUTPATIENT)
Dept: MRI IMAGING | Facility: HOSPITAL | Age: 74
End: 2024-07-27
Payer: MEDICARE

## 2024-07-27 PROBLEM — D72.829 LEUKOCYTOSIS: Status: ACTIVE | Noted: 2024-07-27

## 2024-07-27 LAB
ALBUMIN SERPL-MCNC: 3.5 G/DL (ref 3.5–5.2)
ALBUMIN/GLOB SERPL: 1.5 G/DL
ALP SERPL-CCNC: 79 U/L (ref 39–117)
ALT SERPL W P-5'-P-CCNC: 114 U/L (ref 1–33)
ANION GAP SERPL CALCULATED.3IONS-SCNC: 11 MMOL/L (ref 5–15)
AST SERPL-CCNC: 93 U/L (ref 1–32)
BASOPHILS # BLD MANUAL: 0 10*3/MM3 (ref 0–0.2)
BASOPHILS NFR BLD MANUAL: 0 % (ref 0–1.5)
BILIRUB SERPL-MCNC: 0.3 MG/DL (ref 0–1.2)
BUN SERPL-MCNC: 26 MG/DL (ref 8–23)
BUN/CREAT SERPL: 36.6 (ref 7–25)
CALCIUM SPEC-SCNC: 9.1 MG/DL (ref 8.6–10.5)
CHLORIDE SERPL-SCNC: 99 MMOL/L (ref 98–107)
CO2 SERPL-SCNC: 25 MMOL/L (ref 22–29)
CREAT SERPL-MCNC: 0.71 MG/DL (ref 0.57–1)
DEPRECATED RDW RBC AUTO: 42.5 FL (ref 37–54)
EGFRCR SERPLBLD CKD-EPI 2021: 88.8 ML/MIN/1.73
EOSINOPHIL # BLD MANUAL: 0 10*3/MM3 (ref 0–0.4)
EOSINOPHIL NFR BLD MANUAL: 0 % (ref 0.3–6.2)
ERYTHROCYTE [DISTWIDTH] IN BLOOD BY AUTOMATED COUNT: 13 % (ref 12.3–15.4)
GLOBULIN UR ELPH-MCNC: 2.3 GM/DL
GLUCOSE SERPL-MCNC: 143 MG/DL (ref 65–99)
HCT VFR BLD AUTO: 37.2 % (ref 34–46.6)
HGB BLD-MCNC: 12.1 G/DL (ref 12–15.9)
LYMPHOCYTES # BLD MANUAL: 0 10*3/MM3 (ref 0.7–3.1)
LYMPHOCYTES NFR BLD MANUAL: 4 % (ref 5–12)
MCH RBC QN AUTO: 29.2 PG (ref 26.6–33)
MCHC RBC AUTO-ENTMCNC: 32.5 G/DL (ref 31.5–35.7)
MCV RBC AUTO: 89.9 FL (ref 79–97)
MONOCYTES # BLD: 0.81 10*3/MM3 (ref 0.1–0.9)
NEUTROPHILS # BLD AUTO: 19.49 10*3/MM3 (ref 1.7–7)
NEUTROPHILS NFR BLD MANUAL: 65 % (ref 42.7–76)
NEUTS BAND NFR BLD MANUAL: 31 % (ref 0–5)
PLAT MORPH BLD: NORMAL
PLATELET # BLD AUTO: 223 10*3/MM3 (ref 140–450)
PMV BLD AUTO: 11.2 FL (ref 6–12)
POTASSIUM SERPL-SCNC: 4.7 MMOL/L (ref 3.5–5.2)
PROT SERPL-MCNC: 5.8 G/DL (ref 6–8.5)
RBC # BLD AUTO: 4.14 10*6/MM3 (ref 3.77–5.28)
RBC MORPH BLD: NORMAL
SODIUM SERPL-SCNC: 135 MMOL/L (ref 136–145)
VARIANT LYMPHS NFR BLD MANUAL: 0 % (ref 19.6–45.3)
WBC MORPH BLD: NORMAL
WBC NRBC COR # BLD AUTO: 20.3 10*3/MM3 (ref 3.4–10.8)

## 2024-07-27 PROCEDURE — 85025 COMPLETE CBC W/AUTO DIFF WBC: CPT | Performed by: SURGERY

## 2024-07-27 PROCEDURE — 74183 MRI ABD W/O CNTR FLWD CNTR: CPT

## 2024-07-27 PROCEDURE — 80053 COMPREHEN METABOLIC PANEL: CPT | Performed by: SURGERY

## 2024-07-27 PROCEDURE — 25010000002 ENOXAPARIN PER 10 MG: Performed by: SURGERY

## 2024-07-27 PROCEDURE — 25810000003 LACTATED RINGERS PER 1000 ML: Performed by: SURGERY

## 2024-07-27 PROCEDURE — 0 GADOBENATE DIMEGLUMINE 529 MG/ML SOLUTION: Performed by: SURGERY

## 2024-07-27 PROCEDURE — 99024 POSTOP FOLLOW-UP VISIT: CPT | Performed by: SURGERY

## 2024-07-27 PROCEDURE — 85007 BL SMEAR W/DIFF WBC COUNT: CPT | Performed by: SURGERY

## 2024-07-27 PROCEDURE — 25010000002 KETOROLAC TROMETHAMINE PER 15 MG: Performed by: SURGERY

## 2024-07-27 PROCEDURE — 25010000002 PIPERACILLIN SOD-TAZOBACTAM PER 1 G: Performed by: SURGERY

## 2024-07-27 PROCEDURE — A9577 INJ MULTIHANCE: HCPCS | Performed by: SURGERY

## 2024-07-27 RX ORDER — SPIRONOLACTONE 50 MG/1
50 TABLET, FILM COATED ORAL DAILY
Status: ON HOLD | COMMUNITY

## 2024-07-27 RX ORDER — GABAPENTIN 100 MG/1
100-300 CAPSULE ORAL NIGHTLY
Status: ON HOLD | COMMUNITY
End: 2024-07-27

## 2024-07-27 RX ORDER — HYDROCHLOROTHIAZIDE 50 MG/1
50 TABLET ORAL DAILY
Status: ON HOLD | COMMUNITY

## 2024-07-27 RX ORDER — LISINOPRIL 40 MG/1
40 TABLET ORAL DAILY
Status: ON HOLD | COMMUNITY

## 2024-07-27 RX ADMIN — KETOROLAC TROMETHAMINE 15 MG: 30 INJECTION, SOLUTION INTRAMUSCULAR; INTRAVENOUS at 01:23

## 2024-07-27 RX ADMIN — KETOROLAC TROMETHAMINE 15 MG: 30 INJECTION, SOLUTION INTRAMUSCULAR; INTRAVENOUS at 14:41

## 2024-07-27 RX ADMIN — KETOROLAC TROMETHAMINE 15 MG: 30 INJECTION, SOLUTION INTRAMUSCULAR; INTRAVENOUS at 09:15

## 2024-07-27 RX ADMIN — GADOBENATE DIMEGLUMINE 15 ML: 529 INJECTION, SOLUTION INTRAVENOUS at 13:29

## 2024-07-27 RX ADMIN — KETOROLAC TROMETHAMINE 15 MG: 30 INJECTION, SOLUTION INTRAMUSCULAR; INTRAVENOUS at 19:52

## 2024-07-27 RX ADMIN — ENOXAPARIN SODIUM 40 MG: 100 INJECTION SUBCUTANEOUS at 18:22

## 2024-07-27 RX ADMIN — PIPERACILLIN SODIUM AND TAZOBACTAM SODIUM 3.38 G: 3; .375 INJECTION, POWDER, LYOPHILIZED, FOR SOLUTION INTRAVENOUS at 14:41

## 2024-07-27 RX ADMIN — GABAPENTIN 100 MG: 100 CAPSULE ORAL at 19:52

## 2024-07-27 RX ADMIN — PIPERACILLIN SODIUM AND TAZOBACTAM SODIUM 3.38 G: 3; .375 INJECTION, POWDER, LYOPHILIZED, FOR SOLUTION INTRAVENOUS at 18:22

## 2024-07-27 RX ADMIN — SODIUM CHLORIDE, POTASSIUM CHLORIDE, SODIUM LACTATE AND CALCIUM CHLORIDE 75 ML/HR: 600; 310; 30; 20 INJECTION, SOLUTION INTRAVENOUS at 00:54

## 2024-07-27 NOTE — PROGRESS NOTES
"GENERAL SURGERY INPATIENT PROGRESS NOTE    Patient Name:  Esha Boogie  YOB: 1950  MRN: [id number]    SUBJECTIVE    No unexpected events overnight.  The patient reports that she is having back pain.  Postsurgical abdominal pain is manageable.  Laying flat worsens her back pain.    Allergies:  Allergies   Allergen Reactions    Entresto [Sacubitril-Valsartan] Itching and Rash     YEAST    Farxiga [Dapagliflozin] Itching     Itching and yeast infection     Jardiance [Empagliflozin] Itching    Latex Rash    Silicone Rash       Medications:  cetirizine, 10 mg, Oral, Daily  enoxaparin, 40 mg, Subcutaneous, Q24H  gabapentin, 100 mg, Oral, Nightly  hydroCHLOROthiazide, 50 mg, Oral, Daily  ketorolac, 15 mg, Intravenous, Q6H  lisinopril, 40 mg, Oral, Daily  metoprolol succinate XL, 100 mg, Oral, Daily  pantoprazole, 40 mg, Oral, Q AM  piperacillin-tazobactam, 3.375 g, Intravenous, Once  piperacillin-tazobactam, 3.375 g, Intravenous, Q8H  sodium chloride, 10 mL, Intravenous, Q12H  spironolactone, 50 mg, Oral, Daily    lactated ringers, 75 mL/hr, Last Rate: 75 mL/hr (07/27/24 0054)        OBJECTIVE    VITAL SIGNS  /45 (BP Location: Right arm, Patient Position: Lying)   Pulse 91   Temp 98 °F (36.7 °C) (Oral)   Resp 16   Ht 150 cm (59.06\")   Wt 80.3 kg (177 lb)   SpO2 94%   Breastfeeding No   BMI 35.68 kg/m²     Intake/Output Summary (Last 24 hours) at 7/27/2024 1158  Last data filed at 7/27/2024 0352  Gross per 24 hour   Intake 840 ml   Output 500 ml   Net 340 ml       PHYSICAL EXAM    General: Elderly female, sitting up in bed, in no acute distress.  HEENT:  NCAT, PERRL, EOM intact bilaterally, hearing intact, nares patent  Heart:  Regular rate, normotensive, no JVD bilaterally  Lungs:  Normal respiratory effort, regular respiratory rate, no wheezing  Abdomen: Flat, soft, appropriate tender incision clean dry and intact  Extremities:  No cyanosis, clubbing or edema.   Musculoskeletal:  Normal " development of bilateral upper extremities. Normal development of bilateral lower extremities.  Range of motion intact.  No evidence of trauma.  Skin:  No rashes, ecchymosis or jaundice. Dry and non-diaphoretic. Skin color is consistent with ethnicity.    RESULTS    Labs:  I personally reviewed all pertinent labs.   Imaging:  I personally reviewed all pertinent imaging studies.   Pathology:        ASSESSMENT AND PLAN    Active Hospital Problems    Diagnosis     **Choledocholithiasis     Symptomatic cholelithiasis          DAILY CARE PLAN    Postop day 1 status post robotic cholecystectomy with intraoperative cholangiogram revealed common bile duct stones.  She has been n.p.o. since midnight for.  She does have an elevated white count which is likely reactionary, however to empirically cover for cholangitis I will broaden her antibiotics to Zosyn.  Okay to resume a regular diet after MRCP.  Likely undergoing ERCP on Monday.    I discussed the patient's findings and my recommendations with the patient/family, as well as the primary care team.    Gary Davis MD, FACS  General Surgery  7/27/2024  11:58 CDT    Please note that portions of this note were completed with a voice recognition program.

## 2024-07-27 NOTE — PLAN OF CARE
Goal Outcome Evaluation:           Progress: improving  Outcome Evaluation: Patient had MRCP today. Tolerating regular diet, IVF and IVantibx. VSS. voiding independently.

## 2024-07-27 NOTE — PLAN OF CARE
Goal Outcome Evaluation:           Progress: improving       A/O x 4. No C/O pain. 5 lap sites, R 2 lap sites reinforced. NPO at midnight. IV infusing. Voiding. Safety maintained.

## 2024-07-28 LAB
ALBUMIN SERPL-MCNC: 3.2 G/DL (ref 3.5–5.2)
ALBUMIN/GLOB SERPL: 1.2 G/DL
ALP SERPL-CCNC: 95 U/L (ref 39–117)
ALT SERPL W P-5'-P-CCNC: 107 U/L (ref 1–33)
ANION GAP SERPL CALCULATED.3IONS-SCNC: 10 MMOL/L (ref 5–15)
AST SERPL-CCNC: 57 U/L (ref 1–32)
BASOPHILS # BLD AUTO: 0.02 10*3/MM3 (ref 0–0.2)
BASOPHILS NFR BLD AUTO: 0.1 % (ref 0–1.5)
BILIRUB SERPL-MCNC: 0.3 MG/DL (ref 0–1.2)
BUN SERPL-MCNC: 18 MG/DL (ref 8–23)
BUN/CREAT SERPL: 28.1 (ref 7–25)
CALCIUM SPEC-SCNC: 8.9 MG/DL (ref 8.6–10.5)
CHLORIDE SERPL-SCNC: 102 MMOL/L (ref 98–107)
CO2 SERPL-SCNC: 26 MMOL/L (ref 22–29)
CREAT SERPL-MCNC: 0.64 MG/DL (ref 0.57–1)
DEPRECATED RDW RBC AUTO: 44.3 FL (ref 37–54)
EGFRCR SERPLBLD CKD-EPI 2021: 92.3 ML/MIN/1.73
EOSINOPHIL # BLD AUTO: 0.1 10*3/MM3 (ref 0–0.4)
EOSINOPHIL NFR BLD AUTO: 0.7 % (ref 0.3–6.2)
ERYTHROCYTE [DISTWIDTH] IN BLOOD BY AUTOMATED COUNT: 13.1 % (ref 12.3–15.4)
GLOBULIN UR ELPH-MCNC: 2.6 GM/DL
GLUCOSE SERPL-MCNC: 123 MG/DL (ref 65–99)
HCT VFR BLD AUTO: 35.3 % (ref 34–46.6)
HGB BLD-MCNC: 11.3 G/DL (ref 12–15.9)
IMM GRANULOCYTES # BLD AUTO: 0.06 10*3/MM3 (ref 0–0.05)
IMM GRANULOCYTES NFR BLD AUTO: 0.4 % (ref 0–0.5)
LYMPHOCYTES # BLD AUTO: 1.25 10*3/MM3 (ref 0.7–3.1)
LYMPHOCYTES NFR BLD AUTO: 8.9 % (ref 19.6–45.3)
MCH RBC QN AUTO: 29.2 PG (ref 26.6–33)
MCHC RBC AUTO-ENTMCNC: 32 G/DL (ref 31.5–35.7)
MCV RBC AUTO: 91.2 FL (ref 79–97)
MONOCYTES # BLD AUTO: 0.6 10*3/MM3 (ref 0.1–0.9)
MONOCYTES NFR BLD AUTO: 4.3 % (ref 5–12)
NEUTROPHILS NFR BLD AUTO: 11.98 10*3/MM3 (ref 1.7–7)
NEUTROPHILS NFR BLD AUTO: 85.6 % (ref 42.7–76)
NRBC BLD AUTO-RTO: 0 /100 WBC (ref 0–0.2)
PLATELET # BLD AUTO: 174 10*3/MM3 (ref 140–450)
PMV BLD AUTO: 10.5 FL (ref 6–12)
POTASSIUM SERPL-SCNC: 3.9 MMOL/L (ref 3.5–5.2)
PROT SERPL-MCNC: 5.8 G/DL (ref 6–8.5)
RBC # BLD AUTO: 3.87 10*6/MM3 (ref 3.77–5.28)
SODIUM SERPL-SCNC: 138 MMOL/L (ref 136–145)
WBC NRBC COR # BLD AUTO: 14.01 10*3/MM3 (ref 3.4–10.8)

## 2024-07-28 PROCEDURE — 80053 COMPREHEN METABOLIC PANEL: CPT | Performed by: SURGERY

## 2024-07-28 PROCEDURE — 25010000002 PIPERACILLIN SOD-TAZOBACTAM PER 1 G: Performed by: SURGERY

## 2024-07-28 PROCEDURE — 99024 POSTOP FOLLOW-UP VISIT: CPT | Performed by: SURGERY

## 2024-07-28 PROCEDURE — 25010000002 KETOROLAC TROMETHAMINE PER 15 MG: Performed by: SURGERY

## 2024-07-28 PROCEDURE — 85025 COMPLETE CBC W/AUTO DIFF WBC: CPT | Performed by: SURGERY

## 2024-07-28 PROCEDURE — 25010000002 ENOXAPARIN PER 10 MG: Performed by: SURGERY

## 2024-07-28 RX ORDER — OXYCODONE HYDROCHLORIDE 5 MG/1
10 TABLET ORAL EVERY 4 HOURS PRN
Status: DISCONTINUED | OUTPATIENT
Start: 2024-07-28 | End: 2024-07-30 | Stop reason: HOSPADM

## 2024-07-28 RX ORDER — SODIUM CHLORIDE, SODIUM LACTATE, POTASSIUM CHLORIDE, CALCIUM CHLORIDE 600; 310; 30; 20 MG/100ML; MG/100ML; MG/100ML; MG/100ML
75 INJECTION, SOLUTION INTRAVENOUS CONTINUOUS
Status: DISCONTINUED | OUTPATIENT
Start: 2024-07-29 | End: 2024-07-30 | Stop reason: HOSPADM

## 2024-07-28 RX ADMIN — KETOROLAC TROMETHAMINE 15 MG: 30 INJECTION, SOLUTION INTRAMUSCULAR; INTRAVENOUS at 20:48

## 2024-07-28 RX ADMIN — KETOROLAC TROMETHAMINE 15 MG: 30 INJECTION, SOLUTION INTRAMUSCULAR; INTRAVENOUS at 01:58

## 2024-07-28 RX ADMIN — KETOROLAC TROMETHAMINE 15 MG: 30 INJECTION, SOLUTION INTRAMUSCULAR; INTRAVENOUS at 14:00

## 2024-07-28 RX ADMIN — KETOROLAC TROMETHAMINE 15 MG: 30 INJECTION, SOLUTION INTRAMUSCULAR; INTRAVENOUS at 08:32

## 2024-07-28 RX ADMIN — GABAPENTIN 100 MG: 100 CAPSULE ORAL at 20:49

## 2024-07-28 RX ADMIN — PIPERACILLIN SODIUM AND TAZOBACTAM SODIUM 3.38 G: 3; .375 INJECTION, POWDER, LYOPHILIZED, FOR SOLUTION INTRAVENOUS at 10:54

## 2024-07-28 RX ADMIN — CETIRIZINE HYDROCHLORIDE 10 MG: 10 TABLET ORAL at 08:31

## 2024-07-28 RX ADMIN — PIPERACILLIN SODIUM AND TAZOBACTAM SODIUM 3.38 G: 3; .375 INJECTION, POWDER, LYOPHILIZED, FOR SOLUTION INTRAVENOUS at 01:58

## 2024-07-28 RX ADMIN — ENOXAPARIN SODIUM 40 MG: 100 INJECTION SUBCUTANEOUS at 18:23

## 2024-07-28 RX ADMIN — PANTOPRAZOLE SODIUM 40 MG: 40 TABLET, DELAYED RELEASE ORAL at 05:05

## 2024-07-28 RX ADMIN — PIPERACILLIN SODIUM AND TAZOBACTAM SODIUM 3.38 G: 3; .375 INJECTION, POWDER, LYOPHILIZED, FOR SOLUTION INTRAVENOUS at 18:24

## 2024-07-28 NOTE — PROGRESS NOTES
"GENERAL SURGERY INPATIENT PROGRESS NOTE    Patient Name:  Esha Boogie  YOB: 1950  MRN: [id number]    SUBJECTIVE    No unexpected events overnight.  The patient reports that her abdominal pain is improving.  Still having some back pain.  She is tolerating a regular diet.  She underwent MRCP last night.    Allergies:  Allergies   Allergen Reactions    Entresto [Sacubitril-Valsartan] Itching and Rash     YEAST    Farxiga [Dapagliflozin] Itching     Itching and yeast infection     Jardiance [Empagliflozin] Itching    Latex Rash    Silicone Rash       Medications:  cetirizine, 10 mg, Oral, Daily  enoxaparin, 40 mg, Subcutaneous, Q24H  gabapentin, 100 mg, Oral, Nightly  hydroCHLOROthiazide, 50 mg, Oral, Daily  ketorolac, 15 mg, Intravenous, Q6H  lisinopril, 40 mg, Oral, Daily  metoprolol succinate XL, 100 mg, Oral, Daily  pantoprazole, 40 mg, Oral, Q AM  piperacillin-tazobactam, 3.375 g, Intravenous, Q8H  sodium chloride, 10 mL, Intravenous, Q12H  spironolactone, 50 mg, Oral, Daily         OBJECTIVE    VITAL SIGNS  /40 (BP Location: Right arm, Patient Position: Lying)   Pulse 95   Temp 97.5 °F (36.4 °C) (Oral)   Resp 16   Ht 150 cm (59.06\")   Wt 80.3 kg (177 lb)   SpO2 96%   Breastfeeding No   BMI 35.68 kg/m²     Intake/Output Summary (Last 24 hours) at 7/28/2024 1222  Last data filed at 7/28/2024 1141  Gross per 24 hour   Intake 890 ml   Output 500 ml   Net 390 ml       PHYSICAL EXAM    General: Pleasant elderly female, sitting up in the chair, in no acute distress.  HEENT:  NCAT, PERRL, EOM intact bilaterally, hearing intact, nares patent  Heart:  Regular rate, normotensive, no JVD bilaterally  Lungs:  Normal respiratory effort, regular respiratory rate, no wheezing  Abdomen: Obese abdomen  Extremities:  No cyanosis, clubbing or edema.   Musculoskeletal:  Normal development of bilateral upper extremities. Normal development of bilateral lower extremities.  Range of motion intact.  No " evidence of trauma.  Skin:  No rashes, ecchymosis or jaundice. Dry and non-diaphoretic. Skin color is consistent with ethnicity.    RESULTS    Labs:  I personally reviewed all pertinent labs.   Imaging:  I personally reviewed all pertinent imaging studies.   Pathology:        ASSESSMENT AND PLAN    Active Hospital Problems    Diagnosis     **Choledocholithiasis     Leukocytosis     Symptomatic cholelithiasis          DAILY CARE PLAN    MRCP confirmed choledocholithiasis.  And for ERCP tomorrow at [facility] with Dr. Franklin.  N.p.o. after midnight.  Continue Zosyn for now.  Repeat labs today.    I discussed the patient's findings and my recommendations with the patient/family, as well as the primary care team.    Gary Davis MD, FACS  General Surgery  7/28/2024  12:22 CDT    Please note that portions of this note were completed with a voice recognition program.

## 2024-07-28 NOTE — PLAN OF CARE
Problem: Adult Inpatient Plan of Care  Goal: Plan of Care Review  Outcome: Ongoing, Progressing  Flowsheets (Taken 7/28/2024 0228)  Progress: improving  Plan of Care Reviewed With: patient  Outcome Evaluation: Pt complains of incisional pain that is well controlled with scheduled Tordal. IVF and IV abx. Voiding. SCDs. Safety maintained.

## 2024-07-29 ENCOUNTER — APPOINTMENT (OUTPATIENT)
Dept: GENERAL RADIOLOGY | Age: 74
End: 2024-07-29
Attending: INTERNAL MEDICINE
Payer: MEDICARE

## 2024-07-29 ENCOUNTER — TELEPHONE (OUTPATIENT)
Dept: GASTROENTEROLOGY | Age: 74
End: 2024-07-29

## 2024-07-29 ENCOUNTER — HOSPITAL ENCOUNTER (OUTPATIENT)
Age: 74
Setting detail: OUTPATIENT SURGERY
Discharge: ANOTHER HOSPITAL | End: 2024-07-29
Attending: INTERNAL MEDICINE | Admitting: INTERNAL MEDICINE
Payer: MEDICARE

## 2024-07-29 ENCOUNTER — ANESTHESIA (OUTPATIENT)
Dept: ENDOSCOPY | Age: 74
End: 2024-07-29
Payer: MEDICARE

## 2024-07-29 ENCOUNTER — ANESTHESIA EVENT (OUTPATIENT)
Dept: ENDOSCOPY | Age: 74
End: 2024-07-29
Payer: MEDICARE

## 2024-07-29 VITALS
RESPIRATION RATE: 18 BRPM | OXYGEN SATURATION: 98 % | HEART RATE: 91 BPM | WEIGHT: 172 LBS | SYSTOLIC BLOOD PRESSURE: 156 MMHG | BODY MASS INDEX: 36.11 KG/M2 | TEMPERATURE: 99.5 F | HEIGHT: 58 IN | DIASTOLIC BLOOD PRESSURE: 76 MMHG

## 2024-07-29 LAB
CYTO UR: NORMAL
GLUCOSE BLD-MCNC: 108 MG/DL (ref 70–99)
GLUCOSE BLD-MCNC: 97 MG/DL (ref 70–99)
LAB AP CASE REPORT: NORMAL
Lab: NORMAL
PATH REPORT.FINAL DX SPEC: NORMAL
PATH REPORT.GROSS SPEC: NORMAL
PERFORMED ON: ABNORMAL
PERFORMED ON: NORMAL

## 2024-07-29 PROCEDURE — 25810000003 LACTATED RINGERS PER 1000 ML: Performed by: SURGERY

## 2024-07-29 PROCEDURE — 7100000010 HC PHASE II RECOVERY - FIRST 15 MIN: Performed by: INTERNAL MEDICINE

## 2024-07-29 PROCEDURE — 6370000000 HC RX 637 (ALT 250 FOR IP): Performed by: INTERNAL MEDICINE

## 2024-07-29 PROCEDURE — 99024 POSTOP FOLLOW-UP VISIT: CPT | Performed by: SURGERY

## 2024-07-29 PROCEDURE — 74328 X-RAY BILE DUCT ENDOSCOPY: CPT | Performed by: INTERNAL MEDICINE

## 2024-07-29 PROCEDURE — 43264 ERCP REMOVE DUCT CALCULI: CPT | Performed by: INTERNAL MEDICINE

## 2024-07-29 PROCEDURE — 25010000002 KETOROLAC TROMETHAMINE PER 15 MG: Performed by: SURGERY

## 2024-07-29 PROCEDURE — 3609018800 HC ERCP DX COLLECTION SPECIMEN BRUSHING/WASHING: Performed by: INTERNAL MEDICINE

## 2024-07-29 PROCEDURE — 25010000002 PIPERACILLIN SOD-TAZOBACTAM PER 1 G: Performed by: SURGERY

## 2024-07-29 PROCEDURE — 3700000001 HC ADD 15 MINUTES (ANESTHESIA): Performed by: INTERNAL MEDICINE

## 2024-07-29 PROCEDURE — 6360000002 HC RX W HCPCS: Performed by: NURSE ANESTHETIST, CERTIFIED REGISTERED

## 2024-07-29 PROCEDURE — 74328 X-RAY BILE DUCT ENDOSCOPY: CPT

## 2024-07-29 PROCEDURE — 7100000001 HC PACU RECOVERY - ADDTL 15 MIN: Performed by: INTERNAL MEDICINE

## 2024-07-29 PROCEDURE — 25010000002 ENOXAPARIN PER 10 MG: Performed by: SURGERY

## 2024-07-29 PROCEDURE — 3700000000 HC ANESTHESIA ATTENDED CARE: Performed by: INTERNAL MEDICINE

## 2024-07-29 PROCEDURE — 7100000000 HC PACU RECOVERY - FIRST 15 MIN: Performed by: INTERNAL MEDICINE

## 2024-07-29 PROCEDURE — 2720000010 HC SURG SUPPLY STERILE: Performed by: INTERNAL MEDICINE

## 2024-07-29 PROCEDURE — C2625 STENT, NON-COR, TEM W/DEL SY: HCPCS | Performed by: INTERNAL MEDICINE

## 2024-07-29 PROCEDURE — 2500000003 HC RX 250 WO HCPCS: Performed by: NURSE ANESTHETIST, CERTIFIED REGISTERED

## 2024-07-29 PROCEDURE — C1769 GUIDE WIRE: HCPCS | Performed by: INTERNAL MEDICINE

## 2024-07-29 PROCEDURE — 82962 GLUCOSE BLOOD TEST: CPT

## 2024-07-29 PROCEDURE — 2580000003 HC RX 258: Performed by: INTERNAL MEDICINE

## 2024-07-29 PROCEDURE — 43274 ERCP DUCT STENT PLACEMENT: CPT | Performed by: INTERNAL MEDICINE

## 2024-07-29 PROCEDURE — 2709999900 HC NON-CHARGEABLE SUPPLY: Performed by: INTERNAL MEDICINE

## 2024-07-29 PROCEDURE — 7100000011 HC PHASE II RECOVERY - ADDTL 15 MIN: Performed by: INTERNAL MEDICINE

## 2024-07-29 DEVICE — BILIARY STENT WITH NAVIFLEXTM RX DELIVERY SYSTEM
Type: IMPLANTABLE DEVICE | Site: BILE DUCT | Status: FUNCTIONAL
Brand: ADVANIX™ BILIARY

## 2024-07-29 RX ORDER — SPIRONOLACTONE 50 MG/1
50 TABLET, FILM COATED ORAL DAILY
COMMUNITY

## 2024-07-29 RX ORDER — FENTANYL CITRATE 50 UG/ML
INJECTION, SOLUTION INTRAMUSCULAR; INTRAVENOUS PRN
Status: DISCONTINUED | OUTPATIENT
Start: 2024-07-29 | End: 2024-07-29 | Stop reason: SDUPTHER

## 2024-07-29 RX ORDER — NYSTATIN 10B UNIT
POWDER (EA) MISCELLANEOUS 2 TIMES DAILY
COMMUNITY

## 2024-07-29 RX ORDER — METOPROLOL SUCCINATE 100 MG/1
100 TABLET, EXTENDED RELEASE ORAL DAILY
COMMUNITY

## 2024-07-29 RX ORDER — OMEPRAZOLE 20 MG/1
20 CAPSULE, DELAYED RELEASE ORAL DAILY
COMMUNITY

## 2024-07-29 RX ORDER — HYDROCODONE BITARTRATE AND ACETAMINOPHEN 7.5; 325 MG/1; MG/1
1 TABLET ORAL EVERY 4 HOURS PRN
Qty: 18 TABLET | Refills: 0 | Status: SHIPPED | OUTPATIENT
Start: 2024-07-29 | End: 2024-08-01

## 2024-07-29 RX ORDER — METOCLOPRAMIDE HYDROCHLORIDE 5 MG/ML
10 INJECTION INTRAMUSCULAR; INTRAVENOUS
Status: DISCONTINUED | OUTPATIENT
Start: 2024-07-29 | End: 2024-07-29 | Stop reason: HOSPADM

## 2024-07-29 RX ORDER — SODIUM CHLORIDE 9 MG/ML
INJECTION, SOLUTION INTRAVENOUS PRN
Status: DISCONTINUED | OUTPATIENT
Start: 2024-07-29 | End: 2024-07-29 | Stop reason: HOSPADM

## 2024-07-29 RX ORDER — SODIUM CHLORIDE 0.9 % (FLUSH) 0.9 %
5-40 SYRINGE (ML) INJECTION PRN
Status: DISCONTINUED | OUTPATIENT
Start: 2024-07-29 | End: 2024-07-29 | Stop reason: HOSPADM

## 2024-07-29 RX ORDER — INDOMETHACIN 100 MG
100 SUPPOSITORY, RECTAL RECTAL ONCE
Status: DISCONTINUED | OUTPATIENT
Start: 2024-07-29 | End: 2024-07-29 | Stop reason: HOSPADM

## 2024-07-29 RX ORDER — TIRZEPATIDE 5 MG/.5ML
5 INJECTION, SOLUTION SUBCUTANEOUS WEEKLY
COMMUNITY

## 2024-07-29 RX ORDER — ONDANSETRON 2 MG/ML
INJECTION INTRAMUSCULAR; INTRAVENOUS PRN
Status: DISCONTINUED | OUTPATIENT
Start: 2024-07-29 | End: 2024-07-29 | Stop reason: SDUPTHER

## 2024-07-29 RX ORDER — PROPOFOL 10 MG/ML
INJECTION, EMULSION INTRAVENOUS PRN
Status: DISCONTINUED | OUTPATIENT
Start: 2024-07-29 | End: 2024-07-29 | Stop reason: SDUPTHER

## 2024-07-29 RX ORDER — NALOXONE HYDROCHLORIDE 0.4 MG/ML
INJECTION, SOLUTION INTRAMUSCULAR; INTRAVENOUS; SUBCUTANEOUS PRN
Status: DISCONTINUED | OUTPATIENT
Start: 2024-07-29 | End: 2024-07-29 | Stop reason: HOSPADM

## 2024-07-29 RX ORDER — LIDOCAINE HYDROCHLORIDE 10 MG/ML
INJECTION, SOLUTION EPIDURAL; INFILTRATION; INTRACAUDAL; PERINEURAL PRN
Status: DISCONTINUED | OUTPATIENT
Start: 2024-07-29 | End: 2024-07-29 | Stop reason: SDUPTHER

## 2024-07-29 RX ORDER — DEXAMETHASONE SODIUM PHOSPHATE 4 MG/ML
INJECTION, SOLUTION INTRA-ARTICULAR; INTRALESIONAL; INTRAMUSCULAR; INTRAVENOUS; SOFT TISSUE PRN
Status: DISCONTINUED | OUTPATIENT
Start: 2024-07-29 | End: 2024-07-29 | Stop reason: SDUPTHER

## 2024-07-29 RX ORDER — SODIUM CHLORIDE, SODIUM LACTATE, POTASSIUM CHLORIDE, CALCIUM CHLORIDE 600; 310; 30; 20 MG/100ML; MG/100ML; MG/100ML; MG/100ML
INJECTION, SOLUTION INTRAVENOUS CONTINUOUS
Status: CANCELLED | OUTPATIENT
Start: 2024-07-29

## 2024-07-29 RX ORDER — DIPHENHYDRAMINE HYDROCHLORIDE 50 MG/ML
12.5 INJECTION INTRAMUSCULAR; INTRAVENOUS
Status: DISCONTINUED | OUTPATIENT
Start: 2024-07-29 | End: 2024-07-29 | Stop reason: HOSPADM

## 2024-07-29 RX ORDER — LISINOPRIL 40 MG/1
40 TABLET ORAL DAILY
COMMUNITY

## 2024-07-29 RX ORDER — SODIUM CHLORIDE, SODIUM LACTATE, POTASSIUM CHLORIDE, CALCIUM CHLORIDE 600; 310; 30; 20 MG/100ML; MG/100ML; MG/100ML; MG/100ML
INJECTION, SOLUTION INTRAVENOUS CONTINUOUS
Status: DISCONTINUED | OUTPATIENT
Start: 2024-07-29 | End: 2024-07-29 | Stop reason: HOSPADM

## 2024-07-29 RX ORDER — HYDROMORPHONE HYDROCHLORIDE 1 MG/ML
0.5 INJECTION, SOLUTION INTRAMUSCULAR; INTRAVENOUS; SUBCUTANEOUS EVERY 5 MIN PRN
Status: DISCONTINUED | OUTPATIENT
Start: 2024-07-29 | End: 2024-07-29 | Stop reason: HOSPADM

## 2024-07-29 RX ORDER — SODIUM CHLORIDE 0.9 % (FLUSH) 0.9 %
5-40 SYRINGE (ML) INJECTION EVERY 12 HOURS SCHEDULED
Status: DISCONTINUED | OUTPATIENT
Start: 2024-07-29 | End: 2024-07-29 | Stop reason: HOSPADM

## 2024-07-29 RX ORDER — INDOMETHACIN 100 MG
100 SUPPOSITORY, RECTAL RECTAL ONCE
Status: CANCELLED | OUTPATIENT
Start: 2024-07-29

## 2024-07-29 RX ORDER — GABAPENTIN 300 MG/1
100 CAPSULE ORAL 3 TIMES DAILY
COMMUNITY

## 2024-07-29 RX ORDER — HYDROMORPHONE HYDROCHLORIDE 1 MG/ML
0.25 INJECTION, SOLUTION INTRAMUSCULAR; INTRAVENOUS; SUBCUTANEOUS EVERY 5 MIN PRN
Status: DISCONTINUED | OUTPATIENT
Start: 2024-07-29 | End: 2024-07-29 | Stop reason: HOSPADM

## 2024-07-29 RX ORDER — INDOMETHACIN 100 MG
SUPPOSITORY, RECTAL RECTAL PRN
Status: DISCONTINUED | OUTPATIENT
Start: 2024-07-29 | End: 2024-07-29 | Stop reason: HOSPADM

## 2024-07-29 RX ADMIN — PROPOFOL 200 MG: 10 INJECTION, EMULSION INTRAVENOUS at 14:48

## 2024-07-29 RX ADMIN — Medication 10 ML: at 10:15

## 2024-07-29 RX ADMIN — LIDOCAINE HYDROCHLORIDE 50 MG: 10 INJECTION, SOLUTION EPIDURAL; INFILTRATION; INTRACAUDAL; PERINEURAL at 14:48

## 2024-07-29 RX ADMIN — KETOROLAC TROMETHAMINE 15 MG: 30 INJECTION, SOLUTION INTRAMUSCULAR; INTRAVENOUS at 02:58

## 2024-07-29 RX ADMIN — ENOXAPARIN SODIUM 40 MG: 100 INJECTION SUBCUTANEOUS at 18:15

## 2024-07-29 RX ADMIN — DEXAMETHASONE SODIUM PHOSPHATE 10 MG: 4 INJECTION, SOLUTION INTRAMUSCULAR; INTRAVENOUS at 14:48

## 2024-07-29 RX ADMIN — PIPERACILLIN SODIUM AND TAZOBACTAM SODIUM 3.38 G: 3; .375 INJECTION, POWDER, LYOPHILIZED, FOR SOLUTION INTRAVENOUS at 02:58

## 2024-07-29 RX ADMIN — SODIUM CHLORIDE, SODIUM LACTATE, POTASSIUM CHLORIDE, AND CALCIUM CHLORIDE: 600; 310; 30; 20 INJECTION, SOLUTION INTRAVENOUS at 14:44

## 2024-07-29 RX ADMIN — PIPERACILLIN SODIUM AND TAZOBACTAM SODIUM 3.38 G: 3; .375 INJECTION, POWDER, LYOPHILIZED, FOR SOLUTION INTRAVENOUS at 11:07

## 2024-07-29 RX ADMIN — OXYCODONE HYDROCHLORIDE 5 MG: 5 TABLET ORAL at 20:32

## 2024-07-29 RX ADMIN — SODIUM CHLORIDE, POTASSIUM CHLORIDE, SODIUM LACTATE AND CALCIUM CHLORIDE 75 ML/HR: 600; 310; 30; 20 INJECTION, SOLUTION INTRAVENOUS at 00:18

## 2024-07-29 RX ADMIN — FENTANYL CITRATE 100 MCG: 50 INJECTION INTRAMUSCULAR; INTRAVENOUS at 14:48

## 2024-07-29 RX ADMIN — GABAPENTIN 100 MG: 100 CAPSULE ORAL at 20:31

## 2024-07-29 RX ADMIN — ONDANSETRON 4 MG: 2 INJECTION INTRAMUSCULAR; INTRAVENOUS at 15:13

## 2024-07-29 RX ADMIN — KETOROLAC TROMETHAMINE 15 MG: 30 INJECTION, SOLUTION INTRAMUSCULAR; INTRAVENOUS at 09:44

## 2024-07-29 ASSESSMENT — PAIN DESCRIPTION - DESCRIPTORS: DESCRIPTORS: ACHING

## 2024-07-29 ASSESSMENT — PAIN - FUNCTIONAL ASSESSMENT
PAIN_FUNCTIONAL_ASSESSMENT: 0-10
PAIN_FUNCTIONAL_ASSESSMENT: NONE - DENIES PAIN
PAIN_FUNCTIONAL_ASSESSMENT: NONE - DENIES PAIN

## 2024-07-29 NOTE — ANESTHESIA POSTPROCEDURE EVALUATION
Department of Anesthesiology  Postprocedure Note    Patient: Elmira Lake  MRN: [id number]  YOB: 1950  Date of evaluation: 7/29/2024    Procedure Summary       Date: 07/29/24 Room / Location: [place] [id number] / [facility]    Anesthesia Start: 1444 Anesthesia Stop: 1528    Procedure: ENDOSCOPIC RETROGRADE CHOLANGIOPANCREATOGRAPHY DIAGNOSTIC (Abdomen) Diagnosis:       Common bile duct (CBD) obstruction      (Common bile duct (CBD) obstruction [K83.1])    Surgeons: Trent Bañuelos MD Responsible Provider: Shanice Ugarte APRN - CRNA    Anesthesia Type: General ASA Status: 3            Anesthesia Type: General    Carolyn Phase I: Carolyn Score: 9    Carolyn Phase II:      Anesthesia Post Evaluation    Patient location during evaluation: PACU  Patient participation: complete - patient participated  Level of consciousness: awake and alert  Pain score: 0  Airway patency: patent  Nausea & Vomiting: no nausea and no vomiting  Cardiovascular status: blood pressure returned to baseline  Respiratory status: acceptable, spontaneous ventilation, room air and nonlabored ventilation  Hydration status: euvolemic  Comments: BP (!) 143/65   Pulse 96   Temp 98.2 °F (36.8 °C) (Temporal)   Resp 18   Ht 1.473 m (4' 10\")   Wt 78 kg (172 lb)   SpO2 98%   BMI 35.95 kg/m²     Pain management: adequate    No notable events documented.

## 2024-07-29 NOTE — PROGRESS NOTES
Patient resting quietly with  at bedside. [facility]. [facility] contacted for pick-up and return to [facility], Rm 390. Dr. Bañuelos here speaking with patient and .

## 2024-07-29 NOTE — ANESTHESIA PRE PROCEDURE
Department of Anesthesiology  Preprocedure Note       Name:  Elmira Lake   Age:  74 y.o.  :  1950                                          MRN:  [id number]         Date:  2024      Surgeon: Surgeon(s):  Trent Bañuelos MD    Procedure: Procedure(s):  ENDOSCOPIC RETROGRADE CHOLANGIOPANCREATOGRAPHY DIAGNOSTIC    Medications prior to admission:   Prior to Admission medications    Medication Sig Start Date End Date Taking? Authorizing Provider   omeprazole (PRILOSEC) 20 MG delayed release capsule Take 1 capsule by mouth daily   Yes Shailesh Contreras MD   metoprolol succinate (TOPROL XL) 100 MG extended release tablet Take 1 tablet by mouth daily   Yes ProviderShailesh MD   Multiple Vitamins-Minerals (PRESERVISION AREDS 2 PO) Take 1 tablet by mouth daily   Yes ProviderShailesh MD   nystatin (MYCOSTATIN) POWD powder Apply topically 2 times daily   Yes Shailesh Contreras MD   gabapentin (NEURONTIN) 300 MG capsule Take 100 mg by mouth 3 times daily.   Yes Shailesh Contreras MD   lisinopril (PRINIVIL;ZESTRIL) 40 MG tablet Take 1 tablet by mouth daily   Yes Shailesh Contreras MD   spironolactone (ALDACTONE) 50 MG tablet Take 1 tablet by mouth daily   Yes Provider, MD Shailesh   Tirzepatide (MOUNJARO) 5 MG/0.5ML SOPN SC injection Inject 0.5 mLs into the skin once a week   Yes Shailesh Contreras MD   ciprofloxacin (CIPRO) 500 MG tablet ciprofloxacin 500 mg tablet   Take 1 tablet every 12 hours by oral route.  Patient not taking: Reported on 2024    Shailesh Contreras MD   atenolol (TENORMIN) 50 MG tablet  3/19/19   Shailesh Contreras MD   fluconazole (DIFLUCAN) 100 MG tablet  18   Shailesh Contreras MD   hydrochlorothiazide (HYDRODIURIL) 25 MG tablet  3/19/19   Shailesh Contreras MD   simvastatin (ZOCOR) 10 MG tablet  3/19/19   Shailesh Contreras MD   ramipril (ALTACE) 5 MG capsule  19   Shailesh Contreras MD   loratadine (CLARITIN) 10 MG capsule Take 1

## 2024-07-29 NOTE — PLAN OF CARE
Goal Outcome Evaluation:  Arrived to complete PT eval.  Pt sitting up in chair.  Pt going for ERCP this date.  Reports she is moving better than she was.  Has been up walking in room, has cleaned herself up in the bathroom and has walked in halls w/ nsg.  Pt feels she is gaining strength and reports it feels better to sit up than to be in the bed due to hx of back pain.  PT to sign off w/ no skilled needs at this time.  Advised pt to continue walks in [place] w/ nsg w/ education for safety/falls prevention.  Please reconsult if pt's mobility declines w/ needs.

## 2024-07-29 NOTE — PROGRESS NOTES
EMS here to transport patient back to [facility] to room 390. She was transferred per EMS to and fro stretchers. Report called to Rosemarie SINGER the nurse for Rm 390.

## 2024-07-29 NOTE — PLAN OF CARE
Problem: Adult Inpatient Plan of Care  Goal: Plan of Care Review  Outcome: Ongoing, Progressing  Flowsheets (Taken 7/29/2024 9610)  Progress: improving  Plan of Care Reviewed With: patient  Outcome Evaluation: Patient rested well. Complains of pain x2 but controlled with scheduled pain meds. IVF and IV abx infusing per order. Up standby, voiding. VSS. Safety maintained.

## 2024-07-29 NOTE — PAYOR COMM NOTE
"Mehul Quintana (74 y.o. Female)  [id number]   admit inpt postop order inpt  Came in outpt .      Admit 7/26    [facility] phone   fax           Date of Birth   1950    Social Security Number       Address   [address] [address]    Home Phone   [phone]    MRN   [id number]       Judaism   Uatsdin    Marital Status                               Admission Date   7/26/24    Admission Type   Elective    Admitting Provider   Gary Davis MD    Attending Provider   Gary Davis MD    Department, Room/Bed   [facility] 3C, 390/1       Discharge Date       Discharge Disposition       Discharge Destination                                 Attending Provider: Gary Davis MD    Allergies: Entresto [Sacubitril-valsartan], Farxiga [Dapagliflozin], Jardiance [Empagliflozin], Latex, Silicone    Isolation: None   Infection: None   Code Status: CPR    Ht: 150 cm (59.06\")   Wt: 80.3 kg (177 lb)    Admission Cmt: None   Principal Problem: Choledocholithiasis [K80.50]                   Active Insurance as of 7/26/2024       Primary Coverage       Payor Plan Insurance Group Employer/Plan Group    AETNA MEDICARE REPLACEMENT AETNA MEDICARE REPLACEMENT [id number]       Payor Plan Address Payor Plan Phone Number Payor Plan Fax Number Effective Dates    [address] [phone]  1/1/2023 - None Entered    [facility] [address]         Subscriber Name Subscriber Birth Date Member ID       MEHUL QUINTANA 1950 [id number]                     Emergency Contacts        (Rel.) Home Phone Work Phone Mobile Phone    Randal Quintana (Spouse) [phone] -- [phone]                 History & Physical        Gary Davis MD at 07/26/24 1410          Office New Patient History and Physical:      Referring Provider: Charly Dominguez, AP*  Primary Care Provider: Charly Dominguez, CASSIDY          Chief Complaint "   Patient presents with    Cholelithiasis       PATIENT IS HERE FOR A FOLLOW UP FOR GALLSTONES IN THE GALLBLADDER            Subjective  .         History of present illness:  Esah Boogie is a 74 y.o. female who presents for evaluation of symptomatic cholelithiasis.  She has been having epigastric abdominal pain and discomfort which is occasionally exacerbated by certain foods. She had a CT scan performed that showed gallstones. Additionally, she has a lipoma in the left upper quadrant abdominal wall which she has had for quite some time, which she believes is increasing in size.  She feels discomfort in the left upper quadrant as well.     History:  Medical History        Past Medical History:   Diagnosis Date    Arthritis      Bleeding tendency      CHF (congestive heart failure)      GERD (gastroesophageal reflux disease)      Gout      Hyperlipidemia      Hypertension      Peripheral vascular disease      PONV (postoperative nausea and vomiting)      Sleep apnea       pt sleeps with a bipap machine      ,   Surgical History         Past Surgical History:   Procedure Laterality Date    BREAST BIOPSY Right      ENDOSCOPY   07/2023    HYSTERECTOMY         Partial    TOTAL HIP ARTHROPLASTY Bilateral      VARICOSE VEIN SURGERY Right 11/14/2022     Procedure: RIGHT SAPHENOUS VEIN RADIO FREQUENCY ABLATION;  Surgeon: Serafin Sher DO;  Location: [facility] HYBRID OR 12;  Service: Vascular;  Laterality: Right;    VARICOSE VEIN SURGERY Left 01/16/2023     Procedure: LEFT SAPHENOUS VEIN RADIO FREQUENCY ABLATION;  Surgeon: Serafin Sher DO;  Location: [facility] HYBRID OR 12;  Service: Vascular;  Laterality: Left;      ,         Family History   Problem Relation Age of Onset    Stroke Mother      Cancer Father      Breast cancer Neg Hx     ,   Social History   Social History           Tobacco Use    Smoking status: Never    Smokeless tobacco: Never   Vaping Use    Vaping status: Never Used   Substance Use Topics     Alcohol use: Never    Drug use: Never           Current Medications      Current Outpatient Medications:     cloNIDine (CATAPRES) 0.1 MG tablet, Take 1 tablet by mouth Daily As Needed for High Blood Pressure., Disp: 30 tablet, Rfl: 11    clotrimazole (LOTRIMIN) 1 % cream, APPLY TO THE AFFECTED AND SURROUNDING AREAS OF SKIN BY TOPICAL ROUTE 2 TIMES PER DAY IN THE MORNING AND EVENING, Disp: , Rfl:     furosemide (LASIX) 40 MG tablet, Take 1 tablet by mouth Daily As Needed (daily as needed for increased shortness of breath, swelling and/or weight gain)., Disp: 90 tablet, Rfl: 3    gabapentin (NEURONTIN) 100 MG capsule, 1-3 at night, Disp: 90 capsule, Rfl: 0    hydroCHLOROthiazide (HYDRODIURIL) 50 MG tablet, TAKE ONE TABLET BY MOUTH ONCE EVERY DAY, Disp: 90 tablet, Rfl: 3    lisinopril (PRINIVIL,ZESTRIL) 40 MG tablet, Take 1 tablet by mouth Daily., Disp: 90 tablet, Rfl: 3    Loratadine 10 MG capsule, Take 1 capsule by mouth Daily., Disp: , Rfl:     metoprolol succinate XL (TOPROL-XL) 100 MG 24 hr tablet, Take 1 tablet by mouth Daily., Disp: , Rfl:     multivitamin with minerals (PRESERVISION AREDS PO), Daily., Disp: , Rfl:     nystatin (MYCOSTATIN) 940286 UNIT/GM cream, APPLY TO THE AFFECTED AREA(S) BY TOPICAL ROUTE 2 TIMES PER DAY, Disp: , Rfl:     nystatin (MYCOSTATIN) 188663 UNIT/GM powder, APPLY TO AFFECTED AREA TWICE A DAY FOR 10 DAYS, Disp: , Rfl:     omeprazole (priLOSEC) 20 MG capsule, Take 1 capsule by mouth Daily., Disp: , Rfl:     simvastatin (ZOCOR) 20 MG tablet, Take 1 tablet by mouth Every Night., Disp: , Rfl:     spironolactone (ALDACTONE) 50 MG tablet, Take 1 tablet by mouth Daily., Disp: 90 tablet, Rfl: 3    gabapentin (NEURONTIN) 100 MG capsule, Take 1 capsule by mouth 3 (Three) Times a Day for 30 days., Disp: 90 capsule, Rfl: 0        Allergies:  Farxiga [dapagliflozin], Jardiance [empagliflozin], Entresto [sacubitril-valsartan], Latex, and Silicone        The following portions of the patient's  "history were reviewed and updated as appropriate: allergies, current medications, past family history, past medical history, past social history, past surgical history, and problem list.        Review of Systems  A comprehensive 14 point review of systems was performed and is negative unless otherwise noted              Objective  /84 (BP Location: Left arm, Patient Position: Sitting, Cuff Size: Large Adult)   Ht 147.3 cm (57.99\")   Wt 80.7 kg (178 lb)   BMI 37.21 kg/m²      BMI followup discussion/instruction with patient: continue with current weight loss program, educational material, exercise counseling, nutrition counseling, and Information on healthy weight added to patient's after visit summary.        Physical Exam  Constitutional:       Appearance: Normal appearance. She is normal weight.      Comments: Pleasant elderly female, in no acute distress. Normal development, obese body habitus, short stature. Well nourished   HENT:      Head: Normocephalic and atraumatic.      Right Ear: External ear normal.      Left Ear: External ear normal.      Ears:      Comments: Hearing intact     Nose: Nose normal.      Comments: Nares patent, no septal deviation, no drainage     Mouth/Throat:      Comments: Airway patent, dentition intact, mucus membranes moist  Eyes:      Extraocular Movements: Extraocular movements intact.      Conjunctiva/sclera: Conjunctivae normal.      Pupils: Pupils are equal, round, and reactive to light.      Comments: External eyelids grossly normal, vision intact, no scleral icterus   Neck:      Comments: Trachea midline  Cardiovascular:      Rate and Rhythm: Normal rate.      Comments: Normotensive, no JVD bilaterally  Pulmonary:      Effort: Pulmonary effort is normal.      Breath sounds: Normal breath sounds.      Comments: Normal respiratory rate  Abdominal:      Palpations: Abdomen is soft.      Comments: Non tender. No scars, hernias or masses.   Musculoskeletal:         " General: Normal range of motion.      Cervical back: Normal range of motion.      Comments: Strength intact. Ambulating without difficulty. Absent of trauma   Skin:     General: Skin is warm and dry.      Comments: Skin color is consistent with ethnicity   Neurological:      General: No focal deficit present.      Mental Status: She is alert and oriented to person, place, and time.   Psychiatric:         Mood and Affect: Mood normal.         Behavior: Behavior normal.         Thought Content: Thought content normal.         Judgment: Judgment normal.      Comments: Patient understands disease process and has decision making capacity.             Results:  Labs:  I personally reviewed all pertinent labs. No recent labs  Imaging: I personally reviewed all pertinent imaging studies.  CT scan of the abdomen and pelvis showed gallstones without inflammation.  Pathology:                Assessment & Plan  Diagnoses and all orders for this visit:     1. Symptomatic cholelithiasis (Primary)  -     Case Request; Standing  -     ECG 12 Lead; Future  -     CBC (No Diff); Future  -     Comprehensive Metabolic Panel; Future  -     ceFAZolin (ANCEF) 2,000 mg in sodium chloride 0.9 % 100 mL IVPB  -     acetaminophen (TYLENOL) tablet 650 mg  -     celecoxib (CeleBREX) capsule 200 mg  -     sodium chloride 0.9 % flush 10 mL  -     sodium chloride 0.9 % flush 10 mL  -     sodium chloride 0.9 % infusion 40 mL  -     ondansetron (ZOFRAN) injection 4 mg  -     Case Request     2. 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, unspecified whether serious comorbidity present     Other orders  -     Follow Anesthesia Guidelines / Protocol; Future  -     Follow Anesthesia Guidelines / Protocol; Standing  -     Verify NPO Status; Standing  -     Obtain Informed Consent; Standing  -     Clip Operative Site; Standing  -     Have Patient Void Prior to Procedure; Standing  -     Verify / Perform Chlorhexidine  Skin Prep; Standing  -     Obtain Informed Consent; Future  -     Provide NPO Instructions to Patient; Future  -     Chlorhexidine Skin Prep; Future  -     Perform Betadine Skin Prep; Future  -     Notify Provider - Standard; Standing  -     Place Sequential Compression Device; Standing  -     Maintain Sequential Compression Device; Standing  -     Insert Peripheral IV; Standing  -     Saline Lock & Maintain IV Access; Standing        74-year-old female with symptomatic cholelithiasis.  She would like to proceed with surgery.     Plan for robotic laparoscopic cholecystectomy with intraoperative cholangiogram.     I discussed the risks, benefits and alternatives to cholecystectomy including risk of injury to surrounding structures specifically the bile duct, postoperative bile leaks, deep organ space and superficial skin infection, uncontrolled bleeding, the need for blood transfusion, the possibility of converting to open surgery, incisional hernias, ongoing pain, bowel habit changes, long term drain and wound care, and the need for more surgery or procedures in the future including endoscopy. Additionally, I counseled the patient on the risk of postoperative cardiopulmonary complications. I gave the patient a chance to ask any questions and answered them thoroughly. The patient understood the risks and was agreeable to proceeding to surgery. Consent was obtained from the patient.     Electronically signed by Gary Davis MD at 07/26/24 1410       Current Facility-Administered Medications   Medication Dose Route Frequency Provider Last Rate Last Admin    sennosides-docusate (PERICOLACE) 8.6-50 MG per tablet 2 tablet  2 tablet Oral BID PRN Gary Davis MD        And    polyethylene glycol (MIRALAX) packet 17 g  17 g Oral Daily PRN Gary Davis MD        And    bisacodyl (DULCOLAX) EC tablet 5 mg  5 mg Oral Daily PRN Gary Davis MD        And    bisacodyl (DULCOLAX) suppository 10 mg  10 mg Rectal Daily PRN Ryan  Gary JACKSON MD        cetirizine (zyrTEC) tablet 10 mg  10 mg Oral Daily Gary Davis MD   10 mg at 07/28/24 0831    Enoxaparin Sodium (LOVENOX) syringe 40 mg  40 mg Subcutaneous Q24H Gary Davis MD   40 mg at 07/28/24 1823    gabapentin (NEURONTIN) capsule 100 mg  100 mg Oral Nightly Gary Davis MD   100 mg at 07/28/24 2049    hydroCHLOROthiazide tablet 50 mg  50 mg Oral Daily Gary Davis MD        ketorolac (TORADOL) injection 15 mg  15 mg Intravenous Q6H Gary Davis MD   15 mg at 07/29/24 0944    lactated ringers infusion  75 mL/hr Intravenous Continuous Gary Davis MD 75 mL/hr at 07/29/24 0018 75 mL/hr at 07/29/24 0018    lisinopril (PRINIVIL,ZESTRIL) tablet 40 mg  40 mg Oral Daily Gary Davis MD        metoprolol succinate XL (TOPROL-XL) 24 hr tablet 100 mg  100 mg Oral Daily Gary Davis MD        ondansetron (ZOFRAN) injection 4 mg  4 mg Intravenous Q6H PRN Gary Davis MD        oxyCODONE (ROXICODONE) immediate release tablet 10 mg  10 mg Oral Q4H PRN Gary Davis MD        oxyCODONE (ROXICODONE) immediate release tablet 5 mg  5 mg Oral Q4H PRN Gary Davis MD   5 mg at 07/26/24 1906    pantoprazole (PROTONIX) EC tablet 40 mg  40 mg Oral Q AM aGry Davis MD   40 mg at 07/28/24 0505    piperacillin-tazobactam (ZOSYN) 3.375 g IVPB in 100 mL NS MBP (CD)  3.375 g Intravenous Q8H Gary Davis MD   3.375 g at 07/29/24 1107    sodium chloride 0.9 % flush 10 mL  10 mL Intravenous Q12H Gary Davis MD   10 mL at 07/29/24 1015    sodium chloride 0.9 % flush 10 mL  10 mL Intravenous PRN Gary Davis MD        sodium chloride 0.9 % infusion 40 mL  40 mL Intravenous PRN Gary Davis MD        spironolactone (ALDACTONE) tablet 50 mg  50 mg Oral Daily Gary Davis MD            Operative/Procedure Notes (last 4 days)        Gary Davis MD at 07/26/24 1443          PREOPERATIVE DIAGNOSIS: Symptomatic cholelithiasis        POSTOPERATIVE DIAGNOSIS: Symptomatic cholelithiasis, choledocholithiasis         PROCEDURE PERFORMED: Robotic laparoscopic cholecystectomy with intraoperative cholangiogram        SURGEON: Gary Davis MD        ASSISTANT:  Gary Holland CST, CST        SPECIMENS: Gallbladder for permanent        ANESTHESIA: General tracheal anesthesia with bilateral tap block        BLOOD LOSS:  5 mL    FLUIDS: 1000 mL        WOUND CLASSIFICATION: Class 3, contaminated        FINDINGS:   Moderately distended and inflamed gallbladder.  Critical view of safety obtained with a solitary cystic duct and a solitary cystic artery. Top down dissection. Normal biliary anatomy.  Cholangiogram showed numerous filling defects in the distal common bile duct. Excellent hemostasis.         INDICATIONS:   Esha Boogie is a 74 y.o. female with symptomatic cholelithiasis        DESCRIPTION OF PROCEDURE:      Informed consent was obtained. The patient was taken to the operating room and placed on the operating table in the supine position. Bilateral SCDs were placed. General anesthesia was administered and the patient was intubated without difficulty. Ancef 2 gm was administered for preoperative antibiotics. The abdomen was prepped and draped in the usual fashion.  A full surgical timeout was performed verifying the correct patient, correct procedure and correct site for surgery.     Local anesthesia was infiltrated into the left upper quadrant at [place].  A small incision was made and a Veress needle was inserted with 2 satisfactory clicks.  An excellent saline drop test confirmed correct intra-abdominal placement.  Pneumoperitoneum was initiated to 12 mmHg.  Patient was positioned in reverse Trendelenburg.  Local anesthesia was injected in the left lower hypogastrium.  An 8 mm robotic trocar was inserted under direct visualization and Optiview fashion.  The abdomen was entered safely.  A bilateral tap block was performed using 10 mL of 1% ropivacaine and 100 mcg of Precedex and 100 cc of injectable saline.  Local was  injected at the usual 3 additional trocar sites transversely across the abdomen.  Incisions were made and 3 more robotic trocars were inserted under direct visualization.     The robot was docked and instruments were placed.  The gallbladder was minimally inflamed and mildly distended. The gallbladder was retracted cephalad and the peritoneum was incised using hook cautery.  Careful dissection was undertaken in the cystohepatic triangle. The dissection was of moderate difficulty. There was normal biliary anatomy.  A a top-down dissection was performed and a critical view of safety was obtained of a solitary cystic duct and a solitary cystic artery was obtained.  2 clips were placed proximally and 1 clip placed distally on the cystic artery, and this was divided using cautery.  1 clip was placed high on the cystic duct at the neck of the gallbladder.  An additional 5 mm trocar was placed in the right subcostal location for the cholangiogram clamp.  A cholangiogram catheter was assembled and inserted into the cystic duct.  This was clamped in place.  A cholangiogram was performed which showed a tortuous cystic duct with retrograde filling of the common hepatic duct and the hepatic radicles.  Contrast migrated antegrade through the common bile duct which showed numerous distal common bile duct filling defects.  Contrast did migrate into the duodenum and reflux into the pancreatic duct.  The catheter was flushed and removed.  The cystic duct was ligated using a single PDS Endo loop. Using an Endo Catch bag, the gallbladder was extracted through the left lower quadrant trocar site.  The fascia of the port site was closed with a 0 Vicryl suture in a figure of eight fashion using a transfascial suture passer. There was meticulous hemostasis.  The remainder of the trocars were removed under direct visualization and pneumoperitoneum was released.  The incisions were irrigated using saline and closed with 4-0 Monocryl suture.  " The incisions were dressed with Mastisol and Steri-Strips.     At the conclusion of the case all needle, sharp and sponge counts were correct times two at the completion of the procedure. The patient awoken from anesthesia, extubated in the operating room and was transported to the PACU in stable condition without any immediate complications.      Electronically signed by Gary Davis MD at 07/26/24 1821          Physician Progress Notes (last 4 days)        Gary Davis MD at 07/28/24 1221          GENERAL SURGERY INPATIENT PROGRESS NOTE    Patient Name:  Esha Boogie  YOB: 1950  MRN: [id number]    SUBJECTIVE    No unexpected events overnight.  The patient reports that her abdominal pain is improving.  Still having some back pain.  She is tolerating a regular diet.  She underwent MRCP last night.    Allergies:  Allergies   Allergen Reactions    Entresto [Sacubitril-Valsartan] Itching and Rash     YEAST    Farxiga [Dapagliflozin] Itching     Itching and yeast infection     Jardiance [Empagliflozin] Itching    Latex Rash    Silicone Rash       Medications:  cetirizine, 10 mg, Oral, Daily  enoxaparin, 40 mg, Subcutaneous, Q24H  gabapentin, 100 mg, Oral, Nightly  hydroCHLOROthiazide, 50 mg, Oral, Daily  ketorolac, 15 mg, Intravenous, Q6H  lisinopril, 40 mg, Oral, Daily  metoprolol succinate XL, 100 mg, Oral, Daily  pantoprazole, 40 mg, Oral, Q AM  piperacillin-tazobactam, 3.375 g, Intravenous, Q8H  sodium chloride, 10 mL, Intravenous, Q12H  spironolactone, 50 mg, Oral, Daily         OBJECTIVE    VITAL SIGNS  /40 (BP Location: Right arm, Patient Position: Lying)   Pulse 95   Temp 97.5 °F (36.4 °C) (Oral)   Resp 16   Ht 150 cm (59.06\")   Wt 80.3 kg (177 lb)   SpO2 96%   Breastfeeding No   BMI 35.68 kg/m²     Intake/Output Summary (Last 24 hours) at 7/28/2024 1222  Last data filed at 7/28/2024 1141  Gross per 24 hour   Intake 890 ml   Output 500 ml   Net 390 ml       PHYSICAL " EXAM    General: Pleasant elderly female, sitting up in the chair, in no acute distress.  HEENT:  NCAT, PERRL, EOM intact bilaterally, hearing intact, nares patent  Heart:  Regular rate, normotensive, no JVD bilaterally  Lungs:  Normal respiratory effort, regular respiratory rate, no wheezing  Abdomen: Obese abdomen  Extremities:  No cyanosis, clubbing or edema.   Musculoskeletal:  Normal development of bilateral upper extremities. Normal development of bilateral lower extremities.  Range of motion intact.  No evidence of trauma.  Skin:  No rashes, ecchymosis or jaundice. Dry and non-diaphoretic. Skin color is consistent with ethnicity.    RESULTS    Labs:  I personally reviewed all pertinent labs.   Imaging:  I personally reviewed all pertinent imaging studies.   Pathology:        ASSESSMENT AND PLAN    Active Hospital Problems    Diagnosis     **Choledocholithiasis     Leukocytosis     Symptomatic cholelithiasis          DAILY CARE PLAN    MRCP confirmed choledocholithiasis.  And for ERCP tomorrow at [facility] with Dr. Franklin.  N.p.o. after midnight.  Continue Zosyn for now.  Repeat labs today.    I discussed the patient's findings and my recommendations with the patient/family, as well as the primary care team.    Gary Davis MD, FACS  General Surgery  7/28/2024  12:22 CDT    Please note that portions of this note were completed with a voice recognition program.      Electronically signed by Gary Davis MD at 07/28/24 1244       Gary Davis MD at 07/27/24 1158          GENERAL SURGERY INPATIENT PROGRESS NOTE    Patient Name:  Esha Boogie  YOB: 1950  MRN: [id number]    SUBJECTIVE    No unexpected events overnight.  The patient reports that she is having back pain.  Postsurgical abdominal pain is manageable.  Laying flat worsens her back pain.    Allergies:  Allergies   Allergen Reactions    Entresto [Sacubitril-Valsartan] Itching and Rash     YEAST    Farxiga [Dapagliflozin] Itching      "Itching and yeast infection     Jardiance [Empagliflozin] Itching    Latex Rash    Silicone Rash       Medications:  cetirizine, 10 mg, Oral, Daily  enoxaparin, 40 mg, Subcutaneous, Q24H  gabapentin, 100 mg, Oral, Nightly  hydroCHLOROthiazide, 50 mg, Oral, Daily  ketorolac, 15 mg, Intravenous, Q6H  lisinopril, 40 mg, Oral, Daily  metoprolol succinate XL, 100 mg, Oral, Daily  pantoprazole, 40 mg, Oral, Q AM  piperacillin-tazobactam, 3.375 g, Intravenous, Once  piperacillin-tazobactam, 3.375 g, Intravenous, Q8H  sodium chloride, 10 mL, Intravenous, Q12H  spironolactone, 50 mg, Oral, Daily    lactated ringers, 75 mL/hr, Last Rate: 75 mL/hr (07/27/24 0054)        OBJECTIVE    VITAL SIGNS  /45 (BP Location: Right arm, Patient Position: Lying)   Pulse 91   Temp 98 °F (36.7 °C) (Oral)   Resp 16   Ht 150 cm (59.06\")   Wt 80.3 kg (177 lb)   SpO2 94%   Breastfeeding No   BMI 35.68 kg/m²     Intake/Output Summary (Last 24 hours) at 7/27/2024 1158  Last data filed at 7/27/2024 0352  Gross per 24 hour   Intake 840 ml   Output 500 ml   Net 340 ml       PHYSICAL EXAM    General: Elderly female, sitting up in bed, in no acute distress.  HEENT:  NCAT, PERRL, EOM intact bilaterally, hearing intact, nares patent  Heart:  Regular rate, normotensive, no JVD bilaterally  Lungs:  Normal respiratory effort, regular respiratory rate, no wheezing  Abdomen: Flat, soft, appropriate tender incision clean dry and intact  Extremities:  No cyanosis, clubbing or edema.   Musculoskeletal:  Normal development of bilateral upper extremities. Normal development of bilateral lower extremities.  Range of motion intact.  No evidence of trauma.  Skin:  No rashes, ecchymosis or jaundice. Dry and non-diaphoretic. Skin color is consistent with ethnicity.    RESULTS    Labs:  I personally reviewed all pertinent labs.   Imaging:  I personally reviewed all pertinent imaging studies.   Pathology:        ASSESSMENT AND PLAN    Active Hospital " Problems    Diagnosis     **Choledocholithiasis     Symptomatic cholelithiasis          DAILY CARE PLAN    Postop day 1 status post robotic cholecystectomy with intraoperative cholangiogram revealed common bile duct stones.  She has been n.p.o. since midnight for.  She does have an elevated white count which is likely reactionary, however to empirically cover for cholangitis I will broaden her antibiotics to Zosyn.  Okay to resume a regular diet after MRCP.  Likely undergoing ERCP on Monday.    I discussed the patient's findings and my recommendations with the patient/family, as well as the primary care team.    Gary Davis MD, FACS  General Surgery  7/27/2024  11:58 CDT    Please note that portions of this note were completed with a voice recognition program.      Electronically signed by Gary Davis MD at 07/27/24 7518     7/27  Patient had MRCP today.     7/29  ERCP  today    this after noon

## 2024-07-29 NOTE — TELEPHONE ENCOUNTER
Luis Antonio,    Per Dr Bañuelos today:    IMPRESSION:  1. Successful biliary sphincterotomy and balloon sweep extraction of choledocholithiasis   2. Placement of 10F biliary stent      RECOMMENDATIONS:    1.  Clear liquid diet today with advancing diet tomorrow as tolerated.   2.  Repeat ERCP in 3 months for stone removal.   3.  Monitor LFTS  4.  Please call with any questions/concerns.   5.  Okay to return to [facility] under care of Dr Winkler.      The results were discussed with the patient and family.  A copy of the images obtained were given to the patient.      Trent Bañuelos MD  7/29/2024  3:30 PM            Electronically signed by Trent Bañuelos MD at 7/29/2024  3:37 PM         Trent Bañuelos MD Signed Trent Bañuelos MD 7/29/2024  3:37 PM

## 2024-07-29 NOTE — H&P
Patient Name: Elmira Lake  : 1950  MRN: [id number]  DATE: 24    Allergies:   Allergies   Allergen Reactions    Jardiance [Empagliflozin] Other (See Comments)     Yeast infection    Dapagliflozin Rash    Entresto [Sacubitril-Valsartan] Rash        ENDOSCOPY  History and Physical    Procedure:    [] Diagnostic Colonoscopy       [] Screening Colonoscopy  [] EGD      [x] ERCP      [] EUS       [] Other    [x] Previous office notes/History and Physical reviewed from the patients chart. Please see EMR for further details of HPI. I have examined the patient's status immediately prior to the procedure and:      Indications/HPI:    [x]Abdominal Pain   []Barretts  []Screening/Surveillance   []History of Polyps  []Dysphagia            [] +Cologard/DNA testing  [x]Abnormal Imaging              []EOE Hx              [] Family Hx of CRC/Polyps  []Anemia                            []Food Impaction       []Recent Poor Prep  []GI Bleed             []Lymphadenopathy  []History of Polyps  []Change in bowel habits []Heartburn/Reflux  []Cancer- GI/Lung  []Chest Pain - Non Cardiac []Heme (+) Stool []Ulcers  []Constipation  []Hemoptysis  []Incontinence    []Diarrhea  []Hypoxemia  []Rectal Bleed (BRBPR)  []Nausea/Vomiting   [] Varices  []Crohns/Colitis  []Pancreatic Cyst   [] Cirrhosis   []Pancreatitis    []Abnormal MRCP  [x]Elevated LFT [] Stent Removal, Previous ERCP  []Other:     Anesthesia:   [x] MAC [] Moderate Sedation   [] General   [] None     ROS: 12 pt Review of Symptoms was negative unless mentioned above    Medications:   Prior to Admission medications    Medication Sig Start Date End Date Taking? Authorizing Provider   omeprazole (PRILOSEC) 20 MG delayed release capsule Take 1 capsule by mouth daily   Yes Shailesh Contreras MD   metoprolol succinate (TOPROL XL) 100 MG extended release tablet Take 1 tablet by mouth daily   Yes ProviderShailesh MD   Multiple Vitamins-Minerals (PRESERVISION AREDS 2

## 2024-07-29 NOTE — DISCHARGE INSTRUCTIONS
Okay to shower 48 hours after surgery.  No soaking under water for 2 weeks.  May perform light activities after surgery.  Avoid lifting more than 20 pounds or excessive straining for 4 weeks after surgery.  Alternate ibuprofen and tylenol scheduled around-the-clock. Take prescription pain medication exactly as directed.  Take a stool softener while on prescription pain medication.  Okay to drive once off of pain medication. Continue on a regular diet. Okay to return to work on light duty 2 weeks after surgery or return to work on full duty 4 weeks after surgery.

## 2024-07-29 NOTE — CASE MANAGEMENT/SOCIAL WORK
Called Dr. Sanjeev Franklin office and verified time of ERCP for today (2:00PM).  Contacted [facility] and arranged for patient to be transported to [facility]. Face sheet faxed to [phone].

## 2024-07-29 NOTE — DISCHARGE INSTRUCTIONS
RECOMMENDATIONS:    1.  Clear liquid diet today with advancing diet tomorrow as tolerated.   2.  Repeat ERCP in 3 months for stone removal.   3.  Monitor LFTS  4.  Please call with any questions/concerns.   5.  Okay to return to [facility] under care of Dr Winkler.       Endoscopic Retrograde Cholangiopancreatogram (ERCP): What to Expect at Home  Your Recovery  After you have an endoscopic retrograde cholangiopancreatogram (ERCP), you probably will stay at the hospital or clinic for 1 to 2 hours. This will allow the medicine to wear off. You will be able to go home after your doctor or a nurse checks to make sure you are not having any problems. If you stay in the hospital overnight, you may go home the next day.  You may have a sore throat for a day or two after the procedure.  This care sheet gives you a general idea about how long it will take for you to recover. But each person recovers at a different pace. Follow the steps below to get better as quickly as possible.  How can you care for yourself at home?  Activity    Rest as much as you need to after you go home.     You should be able to go back to your usual activities the day after the procedure.   Diet    Follow your doctor's directions for eating after the procedure.     Drink plenty of fluids (unless your doctor tells you not to).   Medicines    Your doctor will tell you if and when you can restart your medicines. The doctor will also give you instructions about taking any new medicines.     If you stopped taking aspirin or some other blood thinner, your doctor will tell you when to start taking it again.     If you have a sore throat the next day, use an over-the-counter spray to numb your throat. Be safe with medicines. Read and follow all instructions on the label.   Follow-up care is a key part of your treatment and safety. Be sure to make and go to all appointments, and call your doctor if you are having problems. It's also a good idea to know your test

## 2024-07-29 NOTE — PROGRESS NOTES
"GENERAL SURGERY INPATIENT PROGRESS NOTE    Patient Name:  Esha Boogie  YOB: 1950  MRN: [id number]    SUBJECTIVE    Feeling well today.  Abdominal pain is improving.  She has been n.p.o. since midnight.    Allergies:  Allergies   Allergen Reactions    Entresto [Sacubitril-Valsartan] Itching and Rash     YEAST    Farxiga [Dapagliflozin] Itching     Itching and yeast infection     Jardiance [Empagliflozin] Itching    Latex Rash    Silicone Rash       Medications:  cetirizine, 10 mg, Oral, Daily  enoxaparin, 40 mg, Subcutaneous, Q24H  gabapentin, 100 mg, Oral, Nightly  hydroCHLOROthiazide, 50 mg, Oral, Daily  ketorolac, 15 mg, Intravenous, Q6H  lisinopril, 40 mg, Oral, Daily  metoprolol succinate XL, 100 mg, Oral, Daily  pantoprazole, 40 mg, Oral, Q AM  sodium chloride, 10 mL, Intravenous, Q12H  spironolactone, 50 mg, Oral, Daily    lactated ringers, 75 mL/hr, Last Rate: 75 mL/hr (07/29/24 0018)        OBJECTIVE    VITAL SIGNS  /49 (BP Location: Left arm, Patient Position: Sitting)   Pulse 98   Temp 97.7 °F (36.5 °C) (Oral)   Resp 16   Ht 150 cm (59.06\")   Wt 80.3 kg (177 lb)   SpO2 95%   Breastfeeding No   BMI 35.68 kg/m²   No intake or output data in the 24 hours ending 07/29/24 1657    PHYSICAL EXAM    General: Elderly female, standing up in the room, in no acute distress.  HEENT:  NCAT, PERRL, EOM intact bilaterally, hearing intact, nares patent  Heart:  Regular rate, normotensive, no JVD bilaterally  Lungs:  Normal respiratory effort, regular respiratory rate, no wheezing  Abdomen: Flat  Extremities:  No cyanosis, clubbing or edema.   Musculoskeletal:  Normal development of bilateral upper extremities. Normal development of bilateral lower extremities.  Range of motion intact.  No evidence of trauma.  Skin:  No rashes, ecchymosis or jaundice. Dry and non-diaphoretic. Skin color is consistent with ethnicity.    RESULTS    Labs:  I personally reviewed all pertinent labs.   Imaging: "  I personally reviewed all pertinent imaging studies.   Pathology:        ASSESSMENT AND PLAN    Active Hospital Problems    Diagnosis     **Choledocholithiasis     Leukocytosis     Symptomatic cholelithiasis          DAILY CARE PLAN    Plan for ERCP today with Dr. Franklin at [facility].  Likely discharge home tomorrow.    I discussed the patient's findings and my recommendations with the patient/family, as well as the primary care team.    Gary Davis MD, FACS  General Surgery  7/29/2024  16:57 CDT    Please note that portions of this note were completed with a voice recognition program.

## 2024-07-29 NOTE — PROGRESS NOTES
Patient to room 9. She is alert, oriented and able to make needs known. VSS. She denies pain.  at bedside.

## 2024-07-29 NOTE — OP NOTE
Endoscopic Procedure Note    Patient: Elmira Lake : 1950  Med Rec#: [id number] Acc#: [id number]     Primary Care Provider Zafar Cross APRN - NP    Referring Provider: Dr Winkler ([facility] Gen Surgery)    Endoscopist: Trent Bañuelos MD    Date of Procedure:  2024     Procedure:   1. ERCP with stone removal  2. ERCP with stent placement  3. Intra procedure interpretation of biliary fluoroscopy 28721    Indications:   1. Abnormal imaging with stones on IOC  2. Abdominal pain    Anesthesia:  General     Estimated Blood Loss: minimal    Procedure:   Prior to the procedure the patient's chart was reviewed and informed consent was obtained.  Risk and Benefits (Risks including but not limited to bleeding, perforation, infection, 8 to 10% risk of post ERCP pancreatitis, and even death) were discussed with the patient. They were agreeable to continue.     Patient was brought to the operating room, underwent general anesthesia, and placed in the prone position.  A side-viewing duodenoscope was advanced from the oropharynx down to the distal duodenum and reduced into a short position opposite the ampulla. The ampulla appeared normal. A  film was obtained which was normal.     The bile duct was then cannulated using a 0.035 x 260 cm straight Dreamwire. A short nose traction sphincterotome was advanced over the wire and the bile duct was deeply cannulated.  Contrast was injected.  I personally interpreted the bile duct images.  The flow of contrast was adequate.  Contrast injection showed stones in the distal CBD. The pancreatic duct was not cannulated nor injected.     To help discover objects an approximate 1 cm biliary sphincterotomy was made using a monofilament autotome and electrocautery.  There was minimal post sphincterotomy bleeding.      The bile duct was swept multiple times starting the bifurcation with a 12mm biliary extraction balloon. Multiples stones as well as sludge was removed. Multiple

## 2024-07-29 NOTE — CASE MANAGEMENT/SOCIAL WORK
Discharge Planning Assessment  [facility]     Patient Name: Esha Boogie  MRN: [id number]  Today's Date: 7/29/2024    Admit Date: 7/26/2024        Discharge Needs Assessment       Row Name 07/29/24 0844       Living Environment    People in Home spouse    Name(s) of People in Home Randal Boogie (Spouse)  [phone] (Mobile)    Current Living Arrangements home    Potentially Unsafe Housing Conditions none    In the past 12 months has the electric, gas, oil, or water company threatened to shut off services in your home? No    Primary Care Provided by self    Provides Primary Care For no one    Family Caregiver if Needed spouse    Quality of Family Relationships helpful;involved;supportive    Able to Return to Prior Arrangements yes       Resource/Environmental Concerns    Resource/Environmental Concerns none    Transportation Concerns none       Transportation Needs    In the past 12 months, has lack of transportation kept you from medical appointments or from getting medications? no    In the past 12 months, has lack of transportation kept you from meetings, work, or from getting things needed for daily living? No       Food Insecurity    Within the past 12 months, you worried that your food would run out before you got the money to buy more. Never true    Within the past 12 months, the food you bought just didn't last and you didn't have money to get more. Never true       Transition Planning    Patient/Family Anticipates Transition to home with family    Patient/Family Anticipated Services at Transition none    Transportation Anticipated family or friend will provide       Discharge Needs Assessment    Readmission Within the Last 30 Days no previous admission in last 30 days    Equipment Currently Used at Home none    Concerns to be Addressed denies needs/concerns at this time;no discharge needs identified    Anticipated Changes Related to Illness none    Equipment Needed After Discharge none    Discharge  Coordination/Progress Lives at home with . No DME. Has [facility] coverage and PCP is Charly RIOS.  Denies any needs at this time. Will follow and assist if needed.                   Discharge Plan    No documentation.                 Continued Care and Services - Admitted Since 7/26/2024    No active coordination exists for this encounter.          Demographic Summary    No documentation.                  Functional Status    No documentation.                  Psychosocial    No documentation.                  Abuse/Neglect    No documentation.                  Legal    No documentation.                  Substance Abuse    No documentation.                  Patient Forms    No documentation.                     Nneka Spencer RN

## 2024-07-30 ENCOUNTER — READMISSION MANAGEMENT (OUTPATIENT)
Dept: CALL CENTER | Facility: HOSPITAL | Age: 74
End: 2024-07-30
Payer: MEDICARE

## 2024-07-30 VITALS
HEART RATE: 78 BPM | DIASTOLIC BLOOD PRESSURE: 61 MMHG | HEIGHT: 59 IN | WEIGHT: 177 LBS | SYSTOLIC BLOOD PRESSURE: 134 MMHG | TEMPERATURE: 97.8 F | OXYGEN SATURATION: 97 % | RESPIRATION RATE: 16 BRPM | BODY MASS INDEX: 35.68 KG/M2

## 2024-07-30 PROCEDURE — 25810000003 LACTATED RINGERS PER 1000 ML: Performed by: SURGERY

## 2024-07-30 PROCEDURE — 99024 POSTOP FOLLOW-UP VISIT: CPT | Performed by: SURGERY

## 2024-07-30 RX ADMIN — PANTOPRAZOLE SODIUM 40 MG: 40 TABLET, DELAYED RELEASE ORAL at 06:41

## 2024-07-30 RX ADMIN — CETIRIZINE HYDROCHLORIDE 10 MG: 10 TABLET ORAL at 08:57

## 2024-07-30 RX ADMIN — Medication 10 ML: at 08:59

## 2024-07-30 RX ADMIN — SPIRONOLACTONE 50 MG: 50 TABLET ORAL at 08:57

## 2024-07-30 RX ADMIN — SODIUM CHLORIDE, POTASSIUM CHLORIDE, SODIUM LACTATE AND CALCIUM CHLORIDE 75 ML/HR: 600; 310; 30; 20 INJECTION, SOLUTION INTRAVENOUS at 01:13

## 2024-07-30 RX ADMIN — METOPROLOL SUCCINATE 100 MG: 100 TABLET, FILM COATED, EXTENDED RELEASE ORAL at 08:57

## 2024-07-30 NOTE — OUTREACH NOTE
Prep Survey      Flowsheet Row Responses   [facility] facility patient discharged from? [place]   Is LACE score < 7 ? No   Eligibility Readm Mgmt   Discharge diagnosis obotic laparoscopic cholecystectomy with intraoperative cholangiogram   Does the patient have one of the following disease processes/diagnoses(primary or secondary)? General Surgery   Does the patient have Home health ordered? No   Is there a DME ordered? No   Prep survey completed? Yes            CORNELIA FLORES - Registered Nurse

## 2024-07-30 NOTE — PLAN OF CARE
Goal Outcome Evaluation:  Plan of Care Reviewed With: patient        Progress: improving       Pt with minimal complaints of pain so far during shift; see MAR. IVF infusing per order. Tolerating clear liquid diet. Up with SBA. Voiding. Lap x5 sites c/d/I. VSS. Safety maintained. Expected discharge later today.

## 2024-07-31 ENCOUNTER — TELEPHONE (OUTPATIENT)
Dept: GASTROENTEROLOGY | Age: 74
End: 2024-07-31

## 2024-07-31 DIAGNOSIS — M50.30 DDD (DEGENERATIVE DISC DISEASE), CERVICAL: ICD-10-CM

## 2024-07-31 NOTE — PAYOR COMM NOTE
"REF:    [id number]      [facility]  FAX  [phone]     Esha Quintana (74 y.o. Female)       Date of Birth   1950    Social Security Number       Address   [address] [address]    Home Phone   [phone]    MRN   [id number]       [facility]    Marital Status                               Admission Date   7/26/24    Admission Type   Elective    Admitting Provider   Gary Davis MD    Attending Provider       Department, Room/Bed   [facility] 3C, 390/1       Discharge Date   7/30/2024    Discharge Disposition   Home or Self Care    Discharge Destination                                 Attending Provider: (none)   Allergies: Entresto [Sacubitril-valsartan], Farxiga [Dapagliflozin], Jardiance [Empagliflozin], Latex, Silicone    Isolation: None   Infection: None   Code Status: Prior    Ht: 150 cm (59.06\")   Wt: 80.3 kg (177 lb)    Admission Cmt: None   Principal Problem: Choledocholithiasis [K80.50]                   Active Insurance as of 7/26/2024       Primary Coverage       Payor Plan Insurance Group Employer/Plan Group    AETNA MEDICARE REPLACEMENT AETNA MEDICARE REPLACEMENT [id number]       Payor Plan Address Payor Plan Phone Number Payor Plan Fax Number Effective Dates    [address] [phone]  1/1/2023 - None Entered    [facility] [id number]         Subscriber Name Subscriber Birth Date Member ID       ESHA QUINTANA 1950 [id number]                     Emergency Contacts        (Rel.) Home Phone Work Phone Mobile Phone    Randal Quintana (Spouse) [phone] -- [phone]              Discharge Summary    No notes of this type exist for this encounter.       Discharge Order (From admission, onward)       Start     Ordered    07/29/24 1704  Discharge patient  Once        Expected Discharge Date: 07/30/24   Expected Discharge Time: Morning   Discharge Disposition: Home or Self Care   Physician of Record for " Attribution - Please select from Treatment Team: CLOVER VERDUZCO [[id number]]   Review needed by CMO to determine Physician of Record: No      Question Answer Comment   Physician of Record for Attribution - Please select from Treatment Team CLOVER VERDUZCO    Review needed by CMO to determine Physician of Record No        07/29/24 1004

## 2024-07-31 NOTE — TELEPHONE ENCOUNTER
Pt's , Leon called today stating that Dr Bañuelos did patient's procedure on Monday and he was not given any discharge papers or anything and wanted to make sure if Dr Bañuelos meant to give the patient pain medication. She was sent over to [facility] for Dr Bañuelos to do her ERCP by Dr Winkler @ [facility] and was then sent back to [facility] by ambulance. She was discharged from there yesterday morning. She was in a lot of pain last night on the right side of her stomach and under her right breast. She has gabapentin on hand and took one of those. This morning, she is better, but still hurting. She is drinking hot tea today also. I asked if he had reached out to Dr Winkler's office yet and he had not. I explained that Dr Bañuelos stated she would be sent back over to [facility] under Dr Winkler's care, so he probably deferred any of that up to him. Normally if they are discharged at [facility], the case would be different. I asked for him to contact Dr Winkler's office and I will send Dr Bañuelos a message while he is in scopes today to advise further. He voiced understanding.     IMPRESSION:  1. Successful biliary sphincterotomy and balloon sweep extraction of choledocholithiasis   2. Placement of 10F biliary stent      RECOMMENDATIONS:    1.  Clear liquid diet today with advancing diet tomorrow as tolerated.   2.  Repeat ERCP in 3 months for stone removal.   3.  Monitor LFTS  4.  Please call with any questions/concerns.   5.  Okay to return to [facility] under care of Dr Winkler.      The results were discussed with the patient and family.  A copy of the images obtained were given to the patient.      Trent Bañuelos MD  7/29/2024  3:30 PM            Electronically signed by Trent Bañuelos MD at 7/29/2024  3:37 PM

## 2024-08-01 ENCOUNTER — READMISSION MANAGEMENT (OUTPATIENT)
Dept: CALL CENTER | Facility: HOSPITAL | Age: 74
End: 2024-08-01
Payer: MEDICARE

## 2024-08-01 DIAGNOSIS — M50.30 DDD (DEGENERATIVE DISC DISEASE), CERVICAL: ICD-10-CM

## 2024-08-01 DIAGNOSIS — M25.811 IMPINGEMENT OF RIGHT SHOULDER: Primary | ICD-10-CM

## 2024-08-01 RX ORDER — GABAPENTIN 100 MG/1
100 CAPSULE ORAL 3 TIMES DAILY
Qty: 90 CAPSULE | Refills: 0 | Status: ON HOLD | OUTPATIENT
Start: 2024-08-01 | End: 2024-08-31

## 2024-08-01 RX ORDER — GABAPENTIN 100 MG/1
100 CAPSULE ORAL 3 TIMES DAILY
Qty: 90 CAPSULE | Refills: 0 | Status: ON HOLD | OUTPATIENT
Start: 2024-08-01 | End: 2024-08-05 | Stop reason: SDUPTHER

## 2024-08-01 NOTE — OUTREACH NOTE
General Surgery Week 1 Survey      Flowsheet Row Responses   [facility] operated by [facility] patient discharged from? [place]   Does the patient have one of the following disease processes/diagnoses(primary or secondary)? General Surgery   Week 1 attempt successful? Yes   Call start time 0828   Call end time 0831   Discharge diagnosis obotic laparoscopic cholecystectomy with intraoperative cholangiogram   Person spoke with today (if not patient) and relationship Zain Puhg reviewed with patient/caregiver? Yes   Is the patient having any side effects they believe may be caused by any medication additions or changes? No   Does the patient have all medications related to this admission filled (includes all antibiotics, pain medications, etc.) Yes   Is the patient taking all medications as directed (includes completed medication regime)? Yes   Does the patient have a follow up appointment scheduled with their surgeon? Yes  [8/13/24]   Has the patient kept scheduled appointments due by today? N/A   Has home health visited the patient within 72 hours of discharge? N/A   Psychosocial issues? No   Did the patient receive a copy of their discharge instructions? Yes   Nursing interventions Reviewed instructions with patient   What is the patient's perception of their health status since discharge? Improving   Nursing interventions Nurse provided patient education   Is the patient /caregiver able to teach back basic post-op care? No tub bath, swimming, or hot tub until instructed by MD, Take showers only when approved by MD-sponge bathe until then, Keep incision areas clean,dry and protected, Lifting as instructed by MD in discharge instructions   Is the patient/caregiver able to teach back signs and symptoms of incisional infection? Increased redness, swelling or pain at the incisonal site, Increased drainage or bleeding, Fever   Is the patient/caregiver able to teach back steps to recovery at home? Set small, achievable goals  for return to baseline health   Is the patient/caregiver able to teach back the hierarchy of who to call/visit for symptoms/problems? PCP, Specialist, Home health nurse, Urgent Care, ED, 911 Yes   Week 1 call completed? Yes   Is the patient interested in additional calls from an ambulatory ? No   Would this patient benefit from a Referral to Amb Social Work? No   Call end time 0831            Naina ROSARIO - Registered Nurse

## 2024-08-02 NOTE — TELEPHONE ENCOUNTER
I talked to Dr Bañuelos verbally and he stated that patient's care was with Dr Winkler once she was sent back to [facility], so they would need to contact his office. I called Esvin back today and he stated everything was taken care of and appreciated the call back.

## 2024-08-03 ENCOUNTER — DOCUMENTATION (OUTPATIENT)
Dept: BARIATRICS/WEIGHT MGMT | Facility: CLINIC | Age: 74
End: 2024-08-03
Payer: MEDICARE

## 2024-08-03 ENCOUNTER — HOSPITAL ENCOUNTER (INPATIENT)
Facility: HOSPITAL | Age: 74
LOS: 2 days | Discharge: HOME | DRG: 379 | End: 2024-08-07
Attending: SURGERY | Admitting: SURGERY
Payer: MEDICARE

## 2024-08-03 ENCOUNTER — APPOINTMENT (OUTPATIENT)
Dept: CT IMAGING | Facility: HOSPITAL | Age: 74
DRG: 379 | End: 2024-08-03
Payer: MEDICARE

## 2024-08-03 DIAGNOSIS — R19.7 BLOODY DIARRHEA: Primary | ICD-10-CM

## 2024-08-03 DIAGNOSIS — D72.829 LEUKOCYTOSIS, UNSPECIFIED TYPE: ICD-10-CM

## 2024-08-03 DIAGNOSIS — K57.31 DIVERTICULOSIS LARGE INTESTINE W/O PERFORATION OR ABSCESS W/BLEEDING: ICD-10-CM

## 2024-08-03 LAB
ABO GROUP BLD: NORMAL
ALBUMIN SERPL-MCNC: 3.4 G/DL (ref 3.5–5.2)
ALBUMIN/GLOB SERPL: 1 G/DL
ALP SERPL-CCNC: 78 U/L (ref 39–117)
ALT SERPL W P-5'-P-CCNC: 24 U/L (ref 1–33)
ANION GAP SERPL CALCULATED.3IONS-SCNC: 11 MMOL/L (ref 5–15)
ANION GAP SERPL CALCULATED.3IONS-SCNC: 11 MMOL/L (ref 5–15)
APTT PPP: 27.7 SECONDS (ref 24.5–36)
AST SERPL-CCNC: 16 U/L (ref 1–32)
BASOPHILS # BLD MANUAL: 0 10*3/MM3 (ref 0–0.2)
BASOPHILS NFR BLD MANUAL: 0 % (ref 0–1.5)
BILIRUB SERPL-MCNC: 0.2 MG/DL (ref 0–1.2)
BILIRUB UR QL STRIP: NEGATIVE
BLD GP AB SCN SERPL QL: NEGATIVE
BUN SERPL-MCNC: 30 MG/DL (ref 8–23)
BUN SERPL-MCNC: 35 MG/DL (ref 8–23)
BUN/CREAT SERPL: 34.3 (ref 7–25)
BUN/CREAT SERPL: 34.5 (ref 7–25)
CALCIUM SPEC-SCNC: 9.6 MG/DL (ref 8.6–10.5)
CALCIUM SPEC-SCNC: 9.9 MG/DL (ref 8.6–10.5)
CHLORIDE SERPL-SCNC: 96 MMOL/L (ref 98–107)
CHLORIDE SERPL-SCNC: 99 MMOL/L (ref 98–107)
CLARITY UR: CLEAR
CO2 SERPL-SCNC: 23 MMOL/L (ref 22–29)
CO2 SERPL-SCNC: 26 MMOL/L (ref 22–29)
COLOR UR: YELLOW
CREAT SERPL-MCNC: 0.87 MG/DL (ref 0.57–1)
CREAT SERPL-MCNC: 1.02 MG/DL (ref 0.57–1)
D-LACTATE SERPL-SCNC: 1.4 MMOL/L (ref 0.5–2)
DEPRECATED RDW RBC AUTO: 43.7 FL (ref 37–54)
EGFRCR SERPLBLD CKD-EPI 2021: 57.5 ML/MIN/1.73
EGFRCR SERPLBLD CKD-EPI 2021: 69.6 ML/MIN/1.73
EOSINOPHIL # BLD MANUAL: 0.23 10*3/MM3 (ref 0–0.4)
EOSINOPHIL NFR BLD MANUAL: 1 % (ref 0.3–6.2)
ERYTHROCYTE [DISTWIDTH] IN BLOOD BY AUTOMATED COUNT: 13.5 % (ref 12.3–15.4)
GLOBULIN UR ELPH-MCNC: 3.3 GM/DL
GLUCOSE SERPL-MCNC: 117 MG/DL (ref 65–99)
GLUCOSE SERPL-MCNC: 148 MG/DL (ref 65–99)
GLUCOSE UR STRIP-MCNC: NEGATIVE MG/DL
HCT VFR BLD AUTO: 33.4 % (ref 34–46.6)
HCT VFR BLD AUTO: 34.3 % (ref 34–46.6)
HGB BLD-MCNC: 11 G/DL (ref 12–15.9)
HGB BLD-MCNC: 11.5 G/DL (ref 12–15.9)
HGB UR QL STRIP.AUTO: NEGATIVE
INR PPP: 1 (ref 0.91–1.09)
KETONES UR QL STRIP: NEGATIVE
LEUKOCYTE ESTERASE UR QL STRIP.AUTO: NEGATIVE
LIPASE SERPL-CCNC: 30 U/L (ref 13–60)
LYMPHOCYTES # BLD MANUAL: 1.56 10*3/MM3 (ref 0.7–3.1)
LYMPHOCYTES NFR BLD MANUAL: 2 % (ref 5–12)
MAGNESIUM SERPL-MCNC: 1.6 MG/DL (ref 1.6–2.4)
MCH RBC QN AUTO: 29.7 PG (ref 26.6–33)
MCHC RBC AUTO-ENTMCNC: 33.5 G/DL (ref 31.5–35.7)
MCV RBC AUTO: 88.6 FL (ref 79–97)
METAMYELOCYTES NFR BLD MANUAL: 2 % (ref 0–0)
MONOCYTES # BLD: 0.45 10*3/MM3 (ref 0.1–0.9)
MYELOCYTES NFR BLD MANUAL: 2 % (ref 0–0)
NEUTROPHILS # BLD AUTO: 19.57 10*3/MM3 (ref 1.7–7)
NEUTROPHILS NFR BLD MANUAL: 83.3 % (ref 42.7–76)
NEUTS BAND NFR BLD MANUAL: 2.9 % (ref 0–5)
NEUTS VAC BLD QL SMEAR: ABNORMAL
NITRITE UR QL STRIP: NEGATIVE
PH UR STRIP.AUTO: 5.5 [PH] (ref 5–8)
PLAT MORPH BLD: NORMAL
PLATELET # BLD AUTO: 356 10*3/MM3 (ref 140–450)
PMV BLD AUTO: 10.5 FL (ref 6–12)
POLYCHROMASIA BLD QL SMEAR: ABNORMAL
POTASSIUM SERPL-SCNC: 4.1 MMOL/L (ref 3.5–5.2)
POTASSIUM SERPL-SCNC: 4.4 MMOL/L (ref 3.5–5.2)
PROT SERPL-MCNC: 6.7 G/DL (ref 6–8.5)
PROT UR QL STRIP: NEGATIVE
PROTHROMBIN TIME: 13.6 SECONDS (ref 11.8–14.8)
RBC # BLD AUTO: 3.87 10*6/MM3 (ref 3.77–5.28)
RH BLD: POSITIVE
SODIUM SERPL-SCNC: 130 MMOL/L (ref 136–145)
SODIUM SERPL-SCNC: 136 MMOL/L (ref 136–145)
SP GR UR STRIP: >=1.03 (ref 1–1.03)
T&S EXPIRATION DATE: NORMAL
UROBILINOGEN UR QL STRIP: NORMAL
VARIANT LYMPHS NFR BLD MANUAL: 6.9 % (ref 19.6–45.3)
WBC NRBC COR # BLD AUTO: 22.68 10*3/MM3 (ref 3.4–10.8)

## 2024-08-03 PROCEDURE — 85730 THROMBOPLASTIN TIME PARTIAL: CPT

## 2024-08-03 PROCEDURE — 85014 HEMATOCRIT: CPT | Performed by: SURGERY

## 2024-08-03 PROCEDURE — 85007 BL SMEAR W/DIFF WBC COUNT: CPT

## 2024-08-03 PROCEDURE — 85018 HEMOGLOBIN: CPT | Performed by: SURGERY

## 2024-08-03 PROCEDURE — 25010000002 PIPERACILLIN SOD-TAZOBACTAM PER 1 G: Performed by: SURGERY

## 2024-08-03 PROCEDURE — 83735 ASSAY OF MAGNESIUM: CPT

## 2024-08-03 PROCEDURE — G0378 HOSPITAL OBSERVATION PER HR: HCPCS

## 2024-08-03 PROCEDURE — 83605 ASSAY OF LACTIC ACID: CPT

## 2024-08-03 PROCEDURE — 83690 ASSAY OF LIPASE: CPT

## 2024-08-03 PROCEDURE — 25810000003 SODIUM CHLORIDE 0.9 % SOLUTION: Performed by: SURGERY

## 2024-08-03 PROCEDURE — 86850 RBC ANTIBODY SCREEN: CPT | Performed by: SURGERY

## 2024-08-03 PROCEDURE — 85610 PROTHROMBIN TIME: CPT

## 2024-08-03 PROCEDURE — 25010000002 HYDROMORPHONE PER 4 MG: Performed by: SURGERY

## 2024-08-03 PROCEDURE — 99285 EMERGENCY DEPT VISIT HI MDM: CPT

## 2024-08-03 PROCEDURE — 25510000001 IOPAMIDOL 61 % SOLUTION

## 2024-08-03 PROCEDURE — 86901 BLOOD TYPING SEROLOGIC RH(D): CPT | Performed by: SURGERY

## 2024-08-03 PROCEDURE — 74177 CT ABD & PELVIS W/CONTRAST: CPT

## 2024-08-03 PROCEDURE — 86900 BLOOD TYPING SEROLOGIC ABO: CPT | Performed by: SURGERY

## 2024-08-03 PROCEDURE — 36415 COLL VENOUS BLD VENIPUNCTURE: CPT | Performed by: SURGERY

## 2024-08-03 PROCEDURE — 85025 COMPLETE CBC W/AUTO DIFF WBC: CPT

## 2024-08-03 PROCEDURE — 81003 URINALYSIS AUTO W/O SCOPE: CPT

## 2024-08-03 PROCEDURE — 25810000003 SODIUM CHLORIDE 0.9 % SOLUTION

## 2024-08-03 PROCEDURE — 80053 COMPREHEN METABOLIC PANEL: CPT

## 2024-08-03 RX ORDER — DEXAMETHASONE SODIUM PHOSPHATE 4 MG/ML
4 INJECTION, SOLUTION INTRA-ARTICULAR; INTRALESIONAL; INTRAMUSCULAR; INTRAVENOUS; SOFT TISSUE EVERY 8 HOURS PRN
Status: DISCONTINUED | OUTPATIENT
Start: 2024-08-03 | End: 2024-08-05

## 2024-08-03 RX ORDER — ONDANSETRON 2 MG/ML
4 INJECTION INTRAMUSCULAR; INTRAVENOUS EVERY 6 HOURS PRN
Status: DISCONTINUED | OUTPATIENT
Start: 2024-08-03 | End: 2024-08-07 | Stop reason: HOSPADM

## 2024-08-03 RX ORDER — METRONIDAZOLE 500 MG/100ML
500 INJECTION, SOLUTION INTRAVENOUS EVERY 8 HOURS
Status: DISCONTINUED | OUTPATIENT
Start: 2024-08-03 | End: 2024-08-03

## 2024-08-03 RX ORDER — HYDROMORPHONE HYDROCHLORIDE 1 MG/ML
0.5 INJECTION, SOLUTION INTRAMUSCULAR; INTRAVENOUS; SUBCUTANEOUS EVERY 4 HOURS PRN
Status: DISCONTINUED | OUTPATIENT
Start: 2024-08-03 | End: 2024-08-04

## 2024-08-03 RX ORDER — SODIUM CHLORIDE 0.9 % (FLUSH) 0.9 %
10 SYRINGE (ML) INJECTION AS NEEDED
Status: DISCONTINUED | OUTPATIENT
Start: 2024-08-03 | End: 2024-08-07 | Stop reason: HOSPADM

## 2024-08-03 RX ORDER — SODIUM CHLORIDE 9 MG/ML
100 INJECTION, SOLUTION INTRAVENOUS CONTINUOUS
Status: DISCONTINUED | OUTPATIENT
Start: 2024-08-03 | End: 2024-08-07 | Stop reason: HOSPADM

## 2024-08-03 RX ORDER — FAMOTIDINE 20 MG/1
40 TABLET, FILM COATED ORAL DAILY
Status: DISCONTINUED | OUTPATIENT
Start: 2024-08-03 | End: 2024-08-07 | Stop reason: HOSPADM

## 2024-08-03 RX ORDER — SODIUM CHLORIDE 0.9 % (FLUSH) 0.9 %
10 SYRINGE (ML) INJECTION EVERY 12 HOURS SCHEDULED
Status: DISCONTINUED | OUTPATIENT
Start: 2024-08-03 | End: 2024-08-07 | Stop reason: HOSPADM

## 2024-08-03 RX ORDER — DIPHENHYDRAMINE HYDROCHLORIDE 50 MG/ML
25 INJECTION INTRAMUSCULAR; INTRAVENOUS EVERY 6 HOURS PRN
Status: DISCONTINUED | OUTPATIENT
Start: 2024-08-03 | End: 2024-08-07 | Stop reason: HOSPADM

## 2024-08-03 RX ORDER — SODIUM CHLORIDE 9 MG/ML
40 INJECTION, SOLUTION INTRAVENOUS AS NEEDED
Status: DISCONTINUED | OUTPATIENT
Start: 2024-08-03 | End: 2024-08-07 | Stop reason: HOSPADM

## 2024-08-03 RX ADMIN — FAMOTIDINE 40 MG: 20 TABLET, FILM COATED ORAL at 22:00

## 2024-08-03 RX ADMIN — IOPAMIDOL 100 ML: 612 INJECTION, SOLUTION INTRAVENOUS at 16:35

## 2024-08-03 RX ADMIN — HYDROMORPHONE HYDROCHLORIDE 0.5 MG: 1 INJECTION, SOLUTION INTRAMUSCULAR; INTRAVENOUS; SUBCUTANEOUS at 21:44

## 2024-08-03 RX ADMIN — PIPERACILLIN SODIUM AND TAZOBACTAM SODIUM 3.38 G: 3; .375 INJECTION, POWDER, LYOPHILIZED, FOR SOLUTION INTRAVENOUS at 21:45

## 2024-08-03 RX ADMIN — SODIUM CHLORIDE 500 ML: 9 INJECTION, SOLUTION INTRAVENOUS at 17:30

## 2024-08-03 RX ADMIN — SODIUM CHLORIDE 100 ML/HR: 9 INJECTION, SOLUTION INTRAVENOUS at 20:09

## 2024-08-03 NOTE — ED PROVIDER NOTES
Subjective   History of Present Illness  Patient is a 74-year-old female that presents to the emergency department for bloody diarrhea.  Patient had a laparoscopic cholecystectomy performed by Dr. Davis on 7/26/2024 and then ERCP with stone removal and stent placement with Dr. Franklin on 7/29/2024.  Patient was discharged home from this facility on Tuesday morning.  She states that since then she has been experiencing generalized abdominal pain but abdominal pain became severe on Thursday and has been intermittently severe since then.   states that patient has not had a bowel movement since for surgery that was performed on 7/26.  He states patient did have her first bowel movement today around 5 AM.  She had 3 good bowel movements this morning that were softer in consistency but not diarrhea.  He states that he then gave patient a stool softener and patient has had 4 bowel movements of diarrhea since then that also had bright red bleeding noted in the toilet and when wiping.  Patient denies any anticoagulation use.  Denies any fever but has had bodyaches and chills.  Denies any vomiting but has had nausea this morning.  Denies any shortness of breath, chest pain, lightheadedness, dizziness, blurred vision, headache or near syncope.  Denies any urinary urgency, frequency, retention or dysuria.      Review of Systems   Gastrointestinal:  Positive for abdominal pain, blood in stool, diarrhea, nausea and vomiting.   All other systems reviewed and are negative.      Past Medical History:   Diagnosis Date    Arthritis     Bleeding tendency     CHF (congestive heart failure)     Diabetes mellitus     GERD (gastroesophageal reflux disease)     Gout     Hearing loss     HEARING AIDS    Hyperlipidemia     Hypertension     Peripheral vascular disease     PONV (postoperative nausea and vomiting)     Sleep apnea     Yeast infection     PRONE TO YEAST       Allergies   Allergen Reactions    Entresto [Sacubitril-Valsartan]  Itching and Rash     YEAST    Farxiga [Dapagliflozin] Itching     Itching and yeast infection     Jardiance [Empagliflozin] Itching    Latex Rash    Silicone Rash       Past Surgical History:   Procedure Laterality Date    BREAST BIOPSY Right     CHOLECYSTECTOMY N/A 7/26/2024    Procedure: LAPAROSCOPIC CHOLECYSTECTOMY INTRAOPERATIVE CHOLANGIOGRAM WITH DAVINCI ROBOT;  Surgeon: Gary Davis MD;  Location: [facility] OR;  Service: Robotics - DaVinci;  Laterality: N/A;    ENDOSCOPY  07/2023    HYSTERECTOMY      Partial    TOTAL HIP ARTHROPLASTY Bilateral     VARICOSE VEIN SURGERY Right 11/14/2022    Procedure: RIGHT SAPHENOUS VEIN RADIO FREQUENCY ABLATION;  Surgeon: Serafin Sher DO;  Location:  [facility];  Service: Vascular;  Laterality: Right;    VARICOSE VEIN SURGERY Left 01/16/2023    Procedure: LEFT SAPHENOUS VEIN RADIO FREQUENCY ABLATION;  Surgeon: Serafin Sher DO;  Location:  [facility];  Service: Vascular;  Laterality: Left;       Family History   Problem Relation Age of Onset    Stroke Mother     Cancer Father     Breast cancer Neg Hx        Social History     Socioeconomic History    Marital status:    Tobacco Use    Smoking status: Never    Smokeless tobacco: Never   Vaping Use    Vaping status: Never Used   Substance and Sexual Activity    Alcohol use: Never    Drug use: Never    Sexual activity: Defer           Objective   Physical Exam  Vitals and nursing note reviewed.   Constitutional:       Appearance: She is ill-appearing.      Comments: Ill-appearing on arrival. In no acute distress.    HENT:      Head: Normocephalic and atraumatic.      Right Ear: External ear normal.      Left Ear: External ear normal.      Nose: Nose normal.      Mouth/Throat:      Mouth: Mucous membranes are moist.      Pharynx: Oropharynx is clear.   Eyes:      Extraocular Movements: Extraocular movements intact.      Conjunctiva/sclera: Conjunctivae normal.      Pupils: Pupils are equal, round,  and reactive to light.   Cardiovascular:      Rate and Rhythm: Normal rate and regular rhythm.      Pulses: Normal pulses.      Heart sounds: Normal heart sounds.   Pulmonary:      Effort: Pulmonary effort is normal. No respiratory distress.      Breath sounds: Normal breath sounds. No wheezing.   Chest:      Chest wall: No tenderness.   Abdominal:      General: Abdomen is protuberant. Bowel sounds are normal. There is no distension.      Palpations: Abdomen is soft.      Tenderness: There is generalized abdominal tenderness. There is no right CVA tenderness, left CVA tenderness, guarding or rebound.      Comments: Surgical incisions are still dressed with Steri-Strips.  No obvious erythema, drainage or edema noted.   Musculoskeletal:         General: Normal range of motion.      Cervical back: Normal range of motion and neck supple.      Right lower leg: No edema.      Left lower leg: No edema.   Skin:     General: Skin is warm and dry.      Capillary Refill: Capillary refill takes less than 2 seconds.   Neurological:      General: No focal deficit present.      Mental Status: She is alert and oriented to person, place, and time. Mental status is at baseline.   Psychiatric:         Mood and Affect: Mood normal.         Behavior: Behavior normal.         Thought Content: Thought content normal.         Judgment: Judgment normal.       Labs Reviewed   COMPREHENSIVE METABOLIC PANEL - Abnormal; Notable for the following components:       Result Value    Glucose 148 (*)     BUN 35 (*)     Creatinine 1.02 (*)     Sodium 130 (*)     Chloride 96 (*)     Albumin 3.4 (*)     BUN/Creatinine Ratio 34.3 (*)     eGFR 57.5 (*)     All other components within normal limits    Narrative:     GFR Normal >60  Chronic Kidney Disease <60  Kidney Failure <15    The GFR formula is only valid for adults with stable renal function between ages 18 and 70.   CBC WITH AUTO DIFFERENTIAL - Abnormal; Notable for the following components:    WBC  22.68 (*)     Hemoglobin 11.5 (*)     All other components within normal limits    Narrative:     The previously reported component NRBC is no longer being reported. Previous result was 0.0 /100 WBC (Reference Range: 0.0-0.2 /100 WBC) on 8/3/2024 at 1559 CDT.   MANUAL DIFFERENTIAL - Abnormal; Notable for the following components:    Neutrophil % 83.3 (*)     Lymphocyte % 6.9 (*)     Monocyte % 2.0 (*)     Metamyelocyte % 2.0 (*)     Myelocyte % 2.0 (*)     Neutrophils Absolute 19.57 (*)     All other components within normal limits   PROTIME-INR - Normal   APTT - Normal   LIPASE - Normal   URINALYSIS W/ CULTURE IF INDICATED - Normal    Narrative:     In absence of clinical symptoms, the presence of pyuria, bacteria, and/or nitrites on the urinalysis result does not correlate with infection.  Urine microscopic not indicated.   LACTIC ACID, PLASMA - Normal   MAGNESIUM - Normal   BASIC METABOLIC PANEL   POCT OCCULT BLOOD STOOL   TYPE AND SCREEN   CBC AND DIFFERENTIAL    Narrative:     The following orders were created for panel order CBC & Differential.  Procedure                               Abnormality         Status                     ---------                               -----------         ------                     CBC Auto Differential[[id number]]        Abnormal            Final result                 Please view results for these tests on the individual orders.      CT Abdomen Pelvis With Contrast   Final Result   1. Postoperative change of recent cholecystectomy. There is also a   common bile duct stent.   2. Sigmoid colon diverticulosis without visualized diverticulitis.   3. 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   4. Small bilateral pleural effusions with atelectasis.           This report was signed and finalized on 8/3/2024 5:17 PM by Amauri Bolanos.               Procedures           ED Course  ED Course as of  08/03/24 1904   Sat Aug 03, 2024   1819 Occult stool positive. [KF]      ED Course User Index  [KF] Ning Valadez, CASSIDY                                             Medical Decision Making  Esha Boogie is a 74 y.o. female who presents to the ED for bloody diarrhea.  Patient had a laparoscopic cholecystectomy performed by Dr. Davis on 7/26/2024 and then ERCP with stone removal and stent placement with Dr. Franklin on 7/29/2024.  Patient was discharged home from this facility on Tuesday morning.  She states that since then she has been experiencing generalized abdominal pain but abdominal pain became severe on Thursday and has been intermittently severe since then.   states that patient has not had a bowel movement since for surgery that was performed on 7/26.  He states patient did have her first bowel movement today around 5 AM.  She had 3 good bowel movements this morning that were softer in consistency but not diarrhea.  He states that he then gave patient a stool softener and patient has had 4 bowel movements of diarrhea since then that also had bright red bleeding noted in the toilet and when wiping.  Patient denies any anticoagulation use.  Denies any fever but has had bodyaches and chills.  Denies any vomiting but has had nausea this morning.  Denies any shortness of breath, chest pain, lightheadedness, dizziness, blurred vision, headache or near syncope.  Denies any urinary urgency, frequency, retention or dysuria.    Patient ill-appearing on arrival. No acute distress was noted.  Vital signs stable.     Patient's presentation raises suspicion for differentials including, but not limited to, GI bleed, postop complication, hemorrhoid, anemia, obstruction.    Past medical history, surgical history, and medication regimen reviewed.     Previous notes, labs, imaging and more reviewed.    Patient was administered 500ml bolus of NS while in the ED.    Please refer to above section of note for lab and  imaging results that were reviewed and interpreted by radiology as well as attending physician.     Given findings described above, patient's presentation is likely consistent with bloody diarrhea.     Spoke with Dr. Walsh, general surgeon on-call regarding patient's presentation.  He evaluated patient in the ED and will admit patient to his service.    I reassessed the patient and discussed the findings of the work up so far with everyone in the room. I said that the next step in treatment would be admission to the hospital for further workup and care. I also said that there is always some diagnostic uncertainty in the ER, that symptoms may change, and new things may be found after being admitted. Dr. Walsh's service assumed primary care of the patient and the patient was admitted to their service in stable condition.    Dragon disclaimer:  Parts of this note may be an electronic transcription/translation of spoken language to printed text using the Dragon dictation system.       Problems Addressed:  Bloody diarrhea: complicated acute illness or injury    Amount and/or Complexity of Data Reviewed  Labs: ordered.  Radiology: ordered.    Risk  Prescription drug management.  Decision regarding hospitalization.        Final diagnoses:   Bloody diarrhea       ED Disposition  ED Disposition       ED Disposition   Decision to Admit    Condition   --    Comment   Level of Care: Med/Surg [1]   Diagnosis: Diverticulosis large intestine w/o perforation or abscess w/bleeding [[id number]]   Admitting Physician: SIMONA WALSH [[id number]]                 No follow-up provider specified.       Medication List      No changes were made to your prescriptions during this visit.            Ning Valadez, APRN  08/03/24 [id number]

## 2024-08-03 NOTE — PROGRESS NOTES
Received phone call this afternoon from patient's  with questions on blood-tinged diarrhea episode that occurred today.  He stated that his wife underwent a laparoscopic cholecystectomy and had not had a bowel movement since the procedure however this a.m. she had a bowel movement with blood found in it and loose.  She had hard stool prior to the release of her diarrhea.  He does state that her strength has improved since her surgery though she still requires assistance moving and ambulating.  I have recommended that if her diarrhea continues and becomes more grossly bloody he is to bring her to the emergency department to be evaluated otherwise they may continue with recommendations of Dr. Davis with respect to advancing diet and activity.

## 2024-08-03 NOTE — H&P
General Surgery   History and Physical     Referring Provider: No ref. provider found    Patient Care Team:  Charly Dominguez APRN as PCP - General (Nurse Practitioner)  Suman Jeffrey PA as Referring Physician (Family Medicine)  Emmanuel Fu MD as Cardiologist (Cardiology)  Kandis Snider APRN as Nurse Practitioner (Family Medicine)  Phuc Jones PA-C as Physician Assistant (Physician Assistant)  Mukund Franklin DO as Surgeon (Neurosurgery)  Gary Davis MD as Surgeon (General Surgery)    Chief complaint: Bloody diarrhea    Subjective      History of present illness:  The patient is a 74 y.o. female status post laparoscopic cholecystectomy but presents today with complaints of bloody diarrhea.   Esha D [place] today and associated nausea and vomiting x 1 today.  She also complains of left lower quadrant pain today.    Review of Systems    Review of Systems - General ROS: positive for  - fatigue and malaise  Respiratory ROS: no cough, shortness of breath, or wheezing  Cardiovascular ROS: no chest pain or dyspnea on exertion  Gastrointestinal ROS: positive for - abdominal pain, blood in stools, diarrhea, gas/bloating, and nausea/vomiting    History  Past Medical History:   Diagnosis Date    Arthritis     Bleeding tendency     CHF (congestive heart failure)     Diabetes mellitus     GERD (gastroesophageal reflux disease)     Gout     Hearing loss     HEARING AIDS    Hyperlipidemia     Hypertension     Peripheral vascular disease     PONV (postoperative nausea and vomiting)     Sleep apnea     Yeast infection     PRONE TO YEAST   ,   Past Surgical History:   Procedure Laterality Date    BREAST BIOPSY Right     CHOLECYSTECTOMY N/A 7/26/2024    Procedure: LAPAROSCOPIC CHOLECYSTECTOMY INTRAOPERATIVE CHOLANGIOGRAM WITH DAVINCI ROBOT;  Surgeon: Gary Davis MD;  Location: [facility];  Service: Robotics - DaVinci;  Laterality: N/A;    ENDOSCOPY  07/2023    HYSTERECTOMY      Partial    TOTAL HIP  ARTHROPLASTY Bilateral     VARICOSE VEIN SURGERY Right 11/14/2022    Procedure: RIGHT SAPHENOUS VEIN RADIO FREQUENCY ABLATION;  Surgeon: Serafin Sher DO;  Location:  [facility];  Service: Vascular;  Laterality: Right;    VARICOSE VEIN SURGERY Left 01/16/2023    Procedure: LEFT SAPHENOUS VEIN RADIO FREQUENCY ABLATION;  Surgeon: Serafin Sher DO;  Location:  [facility];  Service: Vascular;  Laterality: Left;   ,   Family History   Problem Relation Age of Onset    Stroke Mother     Cancer Father     Breast cancer Neg Hx    ,   Social History     Tobacco Use    Smoking status: Never    Smokeless tobacco: Never   Vaping Use    Vaping status: Never Used   Substance Use Topics    Alcohol use: Never    Drug use: Never   , (Not in a hospital admission)   and Allergies:  Entresto [sacubitril-valsartan], Farxiga [dapagliflozin], Jardiance [empagliflozin], Latex, and Silicone    Current Facility-Administered Medications:     Calcium Replacement - Follow Nurse / BPA Driven Protocol, , Does not apply, Nico COPE Anthony K, MD    dexAMETHasone (DECADRON) injection 4 mg, 4 mg, Intravenous, Q8H PRN, Pablo Walsh MD    diphenhydrAMINE (BENADRYL) injection 25 mg, 25 mg, Intravenous, Q6H PRN, Pablo Walsh MD    famotidine (PEPCID) tablet 40 mg, 40 mg, Oral, Daily, Pablo Walsh MD    Magnesium Standard Dose Replacement - Follow Nurse / BPA Driven Protocol, , Does not apply, Nico COPE Anthony K, MD    ondansetron (ZOFRAN) injection 4 mg, 4 mg, Intravenous, Q6H PRN, Pablo Walsh MD    Phosphorus Replacement - Follow Nurse / BPA Driven Protocol, , Does not apply, Nico COPE Anthony K, MD    Potassium Replacement - Follow Nurse / BPA Driven Protocol, , Does not apply, Nico COPE Anthony K, MD    sodium chloride 0.9 % bolus 500 mL, 500 mL, Intravenous, Once, Ning Valadez, APRN    [COMPLETED] Insert Peripheral IV, , , Once **AND** sodium chloride 0.9 % flush 10 mL, 10 mL, Intravenous,  ANATOLIY, Ning Valadez, CASSIDY    sodium chloride 0.9 % flush 10 mL, 10 mL, Intravenous, Q12H, Pablo Walsh MD    sodium chloride 0.9 % flush 10 mL, 10 mL, Intravenous, PRN, Pablo Walsh MD    sodium chloride 0.9 % infusion 40 mL, 40 mL, Intravenous, PRN, Pablo Walsh MD    sodium chloride 0.9 % infusion, 100 mL/hr, Intravenous, Continuous, Pablo Walsh MD    Current Outpatient Medications:     cloNIDine (CATAPRES) 0.1 MG tablet, Take 1 tablet by mouth Daily As Needed for High Blood Pressure., Disp: 30 tablet, Rfl: 11    gabapentin (NEURONTIN) 100 MG capsule, TAKE 1 CAPSULE BY MOUTH THREE TIMES A DAY, Disp: 90 capsule, Rfl: 0    gabapentin (NEURONTIN) 100 MG capsule, Take 1 capsule by mouth 3 (Three) Times a Day for 30 days., Disp: 90 capsule, Rfl: 0    hydroCHLOROthiazide 50 MG tablet, Take 1 tablet by mouth Daily., Disp: , Rfl:     lisinopril (PRINIVIL,ZESTRIL) 40 MG tablet, Take 1 tablet by mouth Daily., Disp: , Rfl:     Loratadine 10 MG capsule, Take 1 capsule by mouth Daily., Disp: , Rfl:     metoprolol succinate XL (TOPROL-XL) 100 MG 24 hr tablet, Take 1 tablet by mouth Daily., Disp: , Rfl:     Mounjaro 5 MG/0.5ML solution pen-injector pen, Inject 0.5 mL under the skin into the appropriate area as directed 1 (One) Time Per Week. WEDNESDAY, Disp: , Rfl:     multivitamin with minerals (PRESERVISION AREDS PO), Take 1 tablet by mouth Daily., Disp: , Rfl:     NON FORMULARY, Take 2 tablets by mouth Daily. ANCIENT NUTRITION COLLAGEN SUPPLEMENT, Disp: , Rfl:     nystatin (MYCOSTATIN) 596881 UNIT/GM powder, Apply 1 Application topically to the appropriate area as directed 2 (Two) Times a Day As Needed (yeast infection)., Disp: , Rfl:     omeprazole (priLOSEC) 20 MG capsule, Take 1 capsule by mouth Daily., Disp: , Rfl:     simvastatin (ZOCOR) 20 MG tablet, Take 1 tablet by mouth Every Night., Disp: , Rfl:     spironolactone (ALDACTONE) 50 MG tablet, Take 1 tablet by mouth Daily., Disp: , Rfl:      Objective     Vital Signs   Temp:  [97.6 °F (36.4 °C)] 97.6 °F (36.4 °C)  Heart Rate:  [75-86] 76  Resp:  [16-18] 18  BP: ()/(36-54) 118/54    Physical Exam:  Physical Exam  Constitutional:       General: She is not in acute distress.     Appearance: Normal appearance. She is obese. She is not ill-appearing, toxic-appearing or diaphoretic.   Cardiovascular:      Rate and Rhythm: Normal rate and regular rhythm.      Pulses: Normal pulses.      Heart sounds: Normal heart sounds.   Pulmonary:      Effort: Pulmonary effort is normal.      Breath sounds: Normal breath sounds.   Abdominal:      General: A surgical scar is present. Bowel sounds are decreased. There is distension.      Palpations: Abdomen is soft.      Tenderness: There is abdominal tenderness in the left lower quadrant.        Results Review:     Lab Results (last 24 hours)       Procedure Component Value Units Date/Time    Urinalysis With Culture If Indicated - Urine, Clean Catch [[id number]]  (Normal) Collected: 08/03/24 1753    Specimen: Urine, Clean Catch Updated: 08/03/24 1825     Color, UA Yellow     Appearance, UA Clear     pH, UA 5.5     Specific Gravity, UA >=1.030     Glucose, UA Negative     Ketones, UA Negative     Bilirubin, UA Negative     Blood, UA Negative     Protein, UA Negative     Leuk Esterase, UA Negative     Nitrite, UA Negative     Urobilinogen, UA 0.2 E.U./dL    Narrative:      In absence of clinical symptoms, the presence of pyuria, bacteria, and/or nitrites on the urinalysis result does not correlate with infection.  Urine microscopic not indicated.    CBC & Differential [[id number]]  (Abnormal) Collected: 08/03/24 1547    Specimen: Blood Updated: 08/03/24 1625    Narrative:      The following orders were created for panel order CBC & Differential.  Procedure                               Abnormality         Status                     ---------                               -----------         ------                      CBC Auto Differential[[id number]]        Abnormal            Final result                 Please view results for these tests on the individual orders.    CBC Auto Differential [[id number]]  (Abnormal) Collected: 08/03/24 1547    Specimen: Blood Updated: 08/03/24 1625     WBC 22.68 10*3/mm3      RBC 3.87 10*6/mm3      Hemoglobin 11.5 g/dL      Hematocrit 34.3 %      MCV 88.6 fL      MCH 29.7 pg      MCHC 33.5 g/dL      RDW 13.5 %      RDW-SD 43.7 fl      MPV 10.5 fL      Platelets 356 10*3/mm3     Narrative:      The previously reported component NRBC is no longer being reported. Previous result was 0.0 /100 WBC (Reference Range: 0.0-0.2 /100 WBC) on 8/3/2024 at 1559 CDT.    Manual Differential [[id number]]  (Abnormal) Collected: 08/03/24 1547    Specimen: Blood Updated: 08/03/24 1625     Neutrophil % 83.3 %      Lymphocyte % 6.9 %      Monocyte % 2.0 %      Eosinophil % 1.0 %      Basophil % 0.0 %      Bands %  2.9 %      Metamyelocyte % 2.0 %      Myelocyte % 2.0 %      Neutrophils Absolute 19.57 10*3/mm3      Lymphocytes Absolute 1.56 10*3/mm3      Monocytes Absolute 0.45 10*3/mm3      Eosinophils Absolute 0.23 10*3/mm3      Basophils Absolute 0.00 10*3/mm3      Polychromasia Slight/1+     Vacuolated Neutrophils Slight/1+     Platelet Morphology Normal    Comprehensive Metabolic Panel [[id number]]  (Abnormal) Collected: 08/03/24 1547    Specimen: Blood Updated: 08/03/24 1622     Glucose 148 mg/dL      BUN 35 mg/dL      Creatinine 1.02 mg/dL      Sodium 130 mmol/L      Potassium 4.4 mmol/L      Comment: Slight hemolysis detected by analyzer. Result may be falsely elevated.        Chloride 96 mmol/L      CO2 23.0 mmol/L      Calcium 9.9 mg/dL      Total Protein 6.7 g/dL      Albumin 3.4 g/dL      ALT (SGPT) 24 U/L      AST (SGOT) 16 U/L      Comment: Slight hemolysis detected by analyzer. Result may be falsely elevated.        Alkaline Phosphatase 78 U/L      Total Bilirubin 0.2 mg/dL      Globulin 3.3 gm/dL      A/G  Ratio 1.0 g/dL      BUN/Creatinine Ratio 34.3     Anion Gap 11.0 mmol/L      eGFR 57.5 mL/min/1.73     Narrative:      GFR Normal >60  Chronic Kidney Disease <60  Kidney Failure <15    The GFR formula is only valid for adults with stable renal function between ages 18 and 70.    Magnesium [[id number]]  (Normal) Collected: 08/03/24 1547    Specimen: Blood Updated: 08/03/24 1622     Magnesium 1.6 mg/dL     Lactic Acid, Plasma [[id number]]  (Normal) Collected: 08/03/24 1547    Specimen: Blood Updated: 08/03/24 1611     Lactate 1.4 mmol/L     Lipase [[id number]]  (Normal) Collected: 08/03/24 1547    Specimen: Blood Updated: 08/03/24 1610     Lipase 30 U/L     Protime-INR [[id number]]  (Normal) Collected: 08/03/24 1547    Specimen: Blood Updated: 08/03/24 1607     Protime 13.6 Seconds      INR 1.00    aPTT [[id number]]  (Normal) Collected: 08/03/24 1547    Specimen: Blood Updated: 08/03/24 1607     PTT 27.7 seconds           Imaging Results (Last 24 Hours)       Procedure Component Value Units Date/Time    CT Abdomen Pelvis With Contrast [[id number]] Collected: 08/03/24 1713     Updated: 08/03/24 1721    Narrative:      EXAM: CT ABDOMEN PELVIS W CONTRAST-      DATE: 8/3/2024 3:10 PM     HISTORY: abdominal pain, recent post op gallbladder and ERCP, now bloody  stool       COMPARISON: None available.     DOSE LENGTH PRODUCT: 437.61 mGy.cm Automatic exposure control was  utilized to make radiation dose as low as reasonably achievable.     TECHNIQUE: Contrast enhanced axial images of the abdomen and pelvis  obtained with multiplanar reformats.     FINDINGS:  VISUALIZED CHEST: No pericardial effusion is seen. There are trace  bilateral pleural effusions and atelectasis.     LIVER/BILE DUCTS: The patient is status post cholecystectomy. There is a  common bile duct stent. Mild focal fatty infiltration of the liver is  noted along the falciform ligament. Fluid and stranding at the  gallbladder fossa is likely postoperative. No  localized fluid collection  is seen. Portal vein branches are patent.     KIDNEYS/URETERS: The bilateral kidneys and visualized ureters are  unremarkable. There is no hydronephrosis.     ADRENAL: Unremarkable.     SPLEEN: Unremarkable.     PANCREAS: Unremarkable.     MESENTERY: Again there are postoperative changes of the mesentery at the  right upper quadrant. No lymphadenopathy or free fluid is seen.     VASCULATURE: There is atherosclerosis of the abdominal aorta without  aneurysm formation. Vasculature is intact without stenosis or occlusion.     GI TRACT: There is no bowel obstruction. The abdominal portion of the GI  tract is unremarkable.     PELVIS: There is diverticulosis of the sigmoid colon without definite  acute diverticulitis. Extensive metallic streak artifact from bilateral  total hip arthroplasty somewhat obscures visualization of the pelvis. No  lymphadenopathy or free fluid is seen.     SOFT TISSUES: There are postoperative changes of the subcutaneous soft  tissues anteriorly.     BONES: No acute or aggressive bony lesion.           Impression:      1. Postoperative change of recent cholecystectomy. There is also a  common bile duct stent.  2. Sigmoid colon diverticulosis without visualized diverticulitis.  3. Visualization of the pelvis somewhat obscured due to metallic streak  artifact from bilateral total hip arthroplasty.  4. Small bilateral pleural effusions with atelectasis.        This report was signed and finalized on 8/3/2024 5:17 PM by Amauri Bolanos.               ASSESSMENT/PLAN   Diagnosis Plan   1. Bloody diarrhea             Diverticulosis large intestine w/o perforation or abscess w/bleeding    Patient does have complaints of left lower quadrant pain and I will initiate antibiotics to cover possible diverticulitis.  Also due to her leukocytosis findings.  She will have H&H's serially to assess status due to the multiple bloody bowel movements per her history.  She will be  admitted for observation status initially and be reassessed in a.m.  Plan and course was explained to the patient and her  and their questions and concerns were addressed currently.    Pablo Walsh MD  08/03/24  18:35 CDT

## 2024-08-04 ENCOUNTER — READMISSION MANAGEMENT (OUTPATIENT)
Dept: CALL CENTER | Facility: HOSPITAL | Age: 74
End: 2024-08-04
Payer: MEDICARE

## 2024-08-04 LAB
ALBUMIN SERPL-MCNC: 3.1 G/DL (ref 3.5–5.2)
ALBUMIN/GLOB SERPL: 1.1 G/DL
ALP SERPL-CCNC: 67 U/L (ref 39–117)
ALT SERPL W P-5'-P-CCNC: 17 U/L (ref 1–33)
ANION GAP SERPL CALCULATED.3IONS-SCNC: 11 MMOL/L (ref 5–15)
ANION GAP SERPL CALCULATED.3IONS-SCNC: 9 MMOL/L (ref 5–15)
AST SERPL-CCNC: 12 U/L (ref 1–32)
BASOPHILS # BLD AUTO: 0.08 10*3/MM3 (ref 0–0.2)
BASOPHILS NFR BLD AUTO: 0.4 % (ref 0–1.5)
BILIRUB SERPL-MCNC: 0.4 MG/DL (ref 0–1.2)
BUN SERPL-MCNC: 18 MG/DL (ref 8–23)
BUN SERPL-MCNC: 18 MG/DL (ref 8–23)
BUN/CREAT SERPL: 25.4 (ref 7–25)
BUN/CREAT SERPL: 25.4 (ref 7–25)
CALCIUM SPEC-SCNC: 9.4 MG/DL (ref 8.6–10.5)
CALCIUM SPEC-SCNC: 9.4 MG/DL (ref 8.6–10.5)
CHLORIDE SERPL-SCNC: 101 MMOL/L (ref 98–107)
CHLORIDE SERPL-SCNC: 101 MMOL/L (ref 98–107)
CO2 SERPL-SCNC: 25 MMOL/L (ref 22–29)
CO2 SERPL-SCNC: 27 MMOL/L (ref 22–29)
CREAT SERPL-MCNC: 0.71 MG/DL (ref 0.57–1)
CREAT SERPL-MCNC: 0.71 MG/DL (ref 0.57–1)
DEPRECATED RDW RBC AUTO: 44.9 FL (ref 37–54)
EGFRCR SERPLBLD CKD-EPI 2021: 88.8 ML/MIN/1.73
EGFRCR SERPLBLD CKD-EPI 2021: 88.8 ML/MIN/1.73
EOSINOPHIL # BLD AUTO: 0.17 10*3/MM3 (ref 0–0.4)
EOSINOPHIL NFR BLD AUTO: 0.8 % (ref 0.3–6.2)
ERYTHROCYTE [DISTWIDTH] IN BLOOD BY AUTOMATED COUNT: 13.5 % (ref 12.3–15.4)
GLOBULIN UR ELPH-MCNC: 2.9 GM/DL
GLUCOSE SERPL-MCNC: 124 MG/DL (ref 65–99)
GLUCOSE SERPL-MCNC: 124 MG/DL (ref 65–99)
HCT VFR BLD AUTO: 33.1 % (ref 34–46.6)
HCT VFR BLD AUTO: 33.2 % (ref 34–46.6)
HCT VFR BLD AUTO: 33.3 % (ref 34–46.6)
HGB BLD-MCNC: 10.7 G/DL (ref 12–15.9)
HGB BLD-MCNC: 10.7 G/DL (ref 12–15.9)
HGB BLD-MCNC: 11 G/DL (ref 12–15.9)
IMM GRANULOCYTES # BLD AUTO: 0.58 10*3/MM3 (ref 0–0.05)
IMM GRANULOCYTES NFR BLD AUTO: 2.9 % (ref 0–0.5)
LYMPHOCYTES # BLD AUTO: 2.5 10*3/MM3 (ref 0.7–3.1)
LYMPHOCYTES NFR BLD AUTO: 12.4 % (ref 19.6–45.3)
MCH RBC QN AUTO: 29 PG (ref 26.6–33)
MCHC RBC AUTO-ENTMCNC: 32.1 G/DL (ref 31.5–35.7)
MCV RBC AUTO: 90.2 FL (ref 79–97)
MONOCYTES # BLD AUTO: 1.34 10*3/MM3 (ref 0.1–0.9)
MONOCYTES NFR BLD AUTO: 6.7 % (ref 5–12)
NEUTROPHILS NFR BLD AUTO: 15.44 10*3/MM3 (ref 1.7–7)
NEUTROPHILS NFR BLD AUTO: 76.8 % (ref 42.7–76)
NRBC BLD AUTO-RTO: 0 /100 WBC (ref 0–0.2)
PLATELET # BLD AUTO: 308 10*3/MM3 (ref 140–450)
PMV BLD AUTO: 10.5 FL (ref 6–12)
POTASSIUM SERPL-SCNC: 4.2 MMOL/L (ref 3.5–5.2)
POTASSIUM SERPL-SCNC: 4.2 MMOL/L (ref 3.5–5.2)
PROT SERPL-MCNC: 6 G/DL (ref 6–8.5)
RBC # BLD AUTO: 3.69 10*6/MM3 (ref 3.77–5.28)
SODIUM SERPL-SCNC: 137 MMOL/L (ref 136–145)
SODIUM SERPL-SCNC: 137 MMOL/L (ref 136–145)
WBC NRBC COR # BLD AUTO: 20.11 10*3/MM3 (ref 3.4–10.8)

## 2024-08-04 PROCEDURE — 80053 COMPREHEN METABOLIC PANEL: CPT | Performed by: SURGERY

## 2024-08-04 PROCEDURE — 85014 HEMATOCRIT: CPT | Performed by: SURGERY

## 2024-08-04 PROCEDURE — 25010000002 PIPERACILLIN SOD-TAZOBACTAM PER 1 G: Performed by: SURGERY

## 2024-08-04 PROCEDURE — 85018 HEMOGLOBIN: CPT | Performed by: SURGERY

## 2024-08-04 PROCEDURE — 85025 COMPLETE CBC W/AUTO DIFF WBC: CPT | Performed by: SURGERY

## 2024-08-04 PROCEDURE — G0378 HOSPITAL OBSERVATION PER HR: HCPCS

## 2024-08-04 RX ADMIN — FAMOTIDINE 40 MG: 20 TABLET, FILM COATED ORAL at 09:55

## 2024-08-04 RX ADMIN — PIPERACILLIN SODIUM AND TAZOBACTAM SODIUM 3.38 G: 3; .375 INJECTION, POWDER, LYOPHILIZED, FOR SOLUTION INTRAVENOUS at 20:36

## 2024-08-04 RX ADMIN — PIPERACILLIN SODIUM AND TAZOBACTAM SODIUM 3.38 G: 3; .375 INJECTION, POWDER, LYOPHILIZED, FOR SOLUTION INTRAVENOUS at 13:52

## 2024-08-04 RX ADMIN — PIPERACILLIN SODIUM AND TAZOBACTAM SODIUM 3.38 G: 3; .375 INJECTION, POWDER, LYOPHILIZED, FOR SOLUTION INTRAVENOUS at 03:51

## 2024-08-04 RX ADMIN — Medication 10 ML: at 20:36

## 2024-08-04 NOTE — PLAN OF CARE
Goal Outcome Evaluation:      Patient received from ED. CO pain medicated per MAR. Up SBA to bathroom. Multiple bloody Bms. MD aware. Q8 H&H. Safety maintained.

## 2024-08-04 NOTE — OUTREACH NOTE
General Surgery Week 2 Survey      Flowsheet Row Responses   [facility] facility patient discharged from? [facility]   Does the patient have one of the following disease processes/diagnoses(primary or secondary)? General Surgery   Week 2 attempt successful? No   Unsuccessful attempts Attempt 1   Revoke Readmitted            CORNELIA FLORES - Registered Nurse

## 2024-08-04 NOTE — PROGRESS NOTES
"Patient Care Team:  Charly Dominguez APRN as PCP - General (Nurse Practitioner)  Suman Jeffrey PA as Referring Physician (Family Medicine)  Emmanuel Fu MD as Cardiologist (Cardiology)  Kandis Snider APRN as Nurse Practitioner (Family Medicine)  Phuc Jones PA-C as Physician Assistant (Physician Assistant)  Mukund Franklin DO as Surgeon (Neurosurgery)  Gary Davis MD as Surgeon (General Surgery)    Subjective     Esha Boogie is yesterday from complaints of bloody diarrhea.  She stated this symptom has improved last night with decreased blood-tinged stool.  Her pain also has improved since last night.  She denies nausea or vomiting.       Review of Systems:     Review of Systems - General ROS: negative  Respiratory ROS: no cough, shortness of breath, or wheezing  Cardiovascular ROS: no chest pain or dyspnea on exertion  Gastrointestinal ROS: no abdominal pain, change in bowel habits, or black or bloody stools  positive for -diarrhea with less blood    Objective     Vital Signs  /41 (BP Location: Left arm, Patient Position: Lying)   Pulse 73   Temp 97.6 °F (36.4 °C) (Oral)   Resp 16   Ht 147.3 cm (58\")   Wt 79.8 kg (176 lb)   SpO2 97%   BMI 36.78 kg/m²     Physical Exam:  Physical Exam  Constitutional:       General: She is not in acute distress.     Appearance: Normal appearance. She is obese. She is not ill-appearing or diaphoretic.   Abdominal:      General: Bowel sounds are normal. There is no distension.      Palpations: Abdomen is soft.      Tenderness: There is abdominal tenderness. There is no guarding or rebound.   Musculoskeletal:         General: No swelling or tenderness.   Neurological:      Mental Status: She is alert.           Results Review:    Labs reviewed    CBC          7/27/2024    05:08 7/28/2024    13:48 8/3/2024    15:47 8/3/2024    22:51   CBC   WBC 20.30  14.01  22.68     RBC 4.14  3.87  3.87     Hemoglobin 12.1  11.3  11.5  11.0    Hematocrit 37.2  " 35.3  34.3  33.4    MCV 89.9  91.2  88.6     MCH 29.2  29.2  29.7     MCHC 32.5  32.0  33.5     RDW 13.0  13.1  13.5     Platelets 223  174  356            Medication Reviewed:   Current Facility-Administered Medications   Medication Dose Route Frequency Provider Last Rate Last Admin    Calcium Replacement - Follow Nurse / BPA Driven Protocol   Does not apply PRPablo Kelly MD        dexAMETHasone (DECADRON) injection 4 mg  4 mg Intravenous Q8H PRN Pablo Walsh MD        diphenhydrAMINE (BENADRYL) injection 25 mg  25 mg Intravenous Q6H PRN Pablo Walsh MD        famotidine (PEPCID) tablet 40 mg  40 mg Oral Daily Pablo Walsh MD   40 mg at 08/03/24 2200    HYDROmorphone (DILAUDID) injection 0.5 mg  0.5 mg Intravenous Q4H PRN Pablo Walsh MD   0.5 mg at 08/03/24 2144    Magnesium Standard Dose Replacement - Follow Nurse / BPA Driven Protocol   Does not apply PRPablo Kelly MD        ondansetron (ZOFRAN) injection 4 mg  4 mg Intravenous Q6H PRN Pablo Walsh MD        Phosphorus Replacement - Follow Nurse / BPA Driven Protocol   Does not apply PRPablo Kelly MD        piperacillin-tazobactam (ZOSYN) 3.375 g IVPB in 100 mL NS MBP (CD)  3.375 g Intravenous Q8H Pablo Walsh MD   3.375 g at 08/04/24 0351    Potassium Replacement - Follow Nurse / BPA Driven Protocol   Does not apply PRN Pablo Walsh MD        sodium chloride 0.9 % flush 10 mL  10 mL Intravenous PRN Ning Valadez APRN        sodium chloride 0.9 % flush 10 mL  10 mL Intravenous Q12H Pablo Walsh MD        sodium chloride 0.9 % flush 10 mL  10 mL Intravenous PRN Pablo Walsh MD        sodium chloride 0.9 % infusion 40 mL  40 mL Intravenous PRN Pablo Walsh MD        sodium chloride 0.9 % infusion  100 mL/hr Intravenous Continuous Pablo Walsh  mL/hr at 08/03/24 2009 100 mL/hr at 08/03/24 2009       Assessment & Plan  POD today from bloody diarrhea.  H&H has remained stable as  well as the patient's status.  Will continue treatment for diverticulitis also due to her left lower quadrant pain which also has improved.  Will start her on clears today and reassess status and GI function.      Pablo Walsh MD  08/04/24  07:30 CDT

## 2024-08-04 NOTE — ED NOTES
Report given to rivera BANERJEE RN.     Nursing report ED to floor  Esha Boogie  74 y.o.  female    HPI:   Chief Complaint   Patient presents with    Black or Bloody Stool       Admitting doctor:   Pablo Walsh MD    Consulting provider(s):  Consults       No orders found from 7/5/2024 to 8/4/2024.             Admitting diagnosis:   The encounter diagnosis was Bloody diarrhea.    Code status:   Current Code Status       Date Active Code Status Order ID Comments User Context       Prior            Allergies:   Entresto [sacubitril-valsartan], Farxiga [dapagliflozin], Jardiance [empagliflozin], Latex, and Silicone    Intake and Output  No intake or output data in the 24 hours ending 08/03/24 1900    Weight:       08/03/24  1514   Weight: 79.8 kg (176 lb)       Most recent vitals:   Vitals:    08/03/24 1553 08/03/24 1650 08/03/24 1653 08/03/24 1805   BP: (!) 87/36 107/47 106/44 118/54   Pulse: 75 86 85 76   Resp:  18  18   Temp:       TempSrc:       SpO2: 95% 96% 96% 98%   Weight:       Height:         Oxygen Therapy: .    Active LDAs/IV Access:   Lines, Drains & Airways       Active LDAs       Name Placement date Placement time Site Days    Peripheral IV 07/29/24 1900 Anterior;Left;Proximal Forearm 07/29/24  1900  Forearm  5    Peripheral IV 08/03/24 1554 Posterior;Right Hand 08/03/24  1554  Hand  less than 1                    Labs (abnormal labs have a star):   Labs Reviewed   COMPREHENSIVE METABOLIC PANEL - Abnormal; Notable for the following components:       Result Value    Glucose 148 (*)     BUN 35 (*)     Creatinine 1.02 (*)     Sodium 130 (*)     Chloride 96 (*)     Albumin 3.4 (*)     BUN/Creatinine Ratio 34.3 (*)     eGFR 57.5 (*)     All other components within normal limits    Narrative:     GFR Normal >60  Chronic Kidney Disease <60  Kidney Failure <15    The GFR formula is only valid for adults with stable renal function between ages 18 and 70.   CBC WITH AUTO DIFFERENTIAL - Abnormal; Notable for  the following components:    WBC 22.68 (*)     Hemoglobin 11.5 (*)     All other components within normal limits    Narrative:     The previously reported component NRBC is no longer being reported. Previous result was 0.0 /100 WBC (Reference Range: 0.0-0.2 /100 WBC) on 8/3/2024 at 1559 CDT.   MANUAL DIFFERENTIAL - Abnormal; Notable for the following components:    Neutrophil % 83.3 (*)     Lymphocyte % 6.9 (*)     Monocyte % 2.0 (*)     Metamyelocyte % 2.0 (*)     Myelocyte % 2.0 (*)     Neutrophils Absolute 19.57 (*)     All other components within normal limits   PROTIME-INR - Normal   APTT - Normal   LIPASE - Normal   URINALYSIS W/ CULTURE IF INDICATED - Normal    Narrative:     In absence of clinical symptoms, the presence of pyuria, bacteria, and/or nitrites on the urinalysis result does not correlate with infection.  Urine microscopic not indicated.   LACTIC ACID, PLASMA - Normal   MAGNESIUM - Normal   BASIC METABOLIC PANEL   POCT OCCULT BLOOD STOOL   TYPE AND SCREEN   CBC AND DIFFERENTIAL    Narrative:     The following orders were created for panel order CBC & Differential.  Procedure                               Abnormality         Status                     ---------                               -----------         ------                     CBC Auto Differential[[id number]]        Abnormal            Final result                 Please view results for these tests on the individual orders.       Meds given in ED:   Medications   sodium chloride 0.9 % flush 10 mL (has no administration in time range)   sodium chloride 0.9 % bolus 500 mL (has no administration in time range)   sodium chloride 0.9 % flush 10 mL (has no administration in time range)   sodium chloride 0.9 % flush 10 mL (has no administration in time range)   sodium chloride 0.9 % infusion 40 mL (has no administration in time range)   sodium chloride 0.9 % infusion (has no administration in time range)   Potassium Replacement - Follow Nurse /  BPA Driven Protocol (has no administration in time range)   Magnesium Standard Dose Replacement - Follow Nurse / BPA Driven Protocol (has no administration in time range)   Phosphorus Replacement - Follow Nurse / BPA Driven Protocol (has no administration in time range)   Calcium Replacement - Follow Nurse / BPA Driven Protocol (has no administration in time range)   famotidine (PEPCID) tablet 40 mg (has no administration in time range)   ondansetron (ZOFRAN) injection 4 mg (has no administration in time range)   diphenhydrAMINE (BENADRYL) injection 25 mg (has no administration in time range)   dexAMETHasone (DECADRON) injection 4 mg (has no administration in time range)   iopamidol (ISOVUE-300) 61 % injection 100 mL (100 mL Intravenous Given 8/3/24 3605)     sodium chloride, 100 mL/hr         NIH Stroke Scale:       Isolation/Infection(s):  No active isolations   No active infections     COVID Testing  Collected .  Resulted .    Nursing report ED to floor:  Mental status: .  Ambulatory status: .  Precautions: .    ED nurse phone extentsion- ..

## 2024-08-05 ENCOUNTER — APPOINTMENT (OUTPATIENT)
Dept: GENERAL RADIOLOGY | Facility: HOSPITAL | Age: 74
DRG: 379 | End: 2024-08-05
Payer: MEDICARE

## 2024-08-05 LAB
ALBUMIN SERPL-MCNC: 3 G/DL (ref 3.5–5.2)
ALBUMIN/GLOB SERPL: 1 G/DL
ALP SERPL-CCNC: 71 U/L (ref 39–117)
ALT SERPL W P-5'-P-CCNC: 16 U/L (ref 1–33)
ANION GAP SERPL CALCULATED.3IONS-SCNC: 12 MMOL/L (ref 5–15)
ANION GAP SERPL CALCULATED.3IONS-SCNC: 15 MMOL/L (ref 5–15)
AST SERPL-CCNC: 15 U/L (ref 1–32)
BILIRUB SERPL-MCNC: 0.3 MG/DL (ref 0–1.2)
BUN SERPL-MCNC: 7 MG/DL (ref 8–23)
BUN SERPL-MCNC: 8 MG/DL (ref 8–23)
BUN/CREAT SERPL: 10.1 (ref 7–25)
BUN/CREAT SERPL: 13.1 (ref 7–25)
CALCIUM SPEC-SCNC: 9.1 MG/DL (ref 8.6–10.5)
CALCIUM SPEC-SCNC: 9.5 MG/DL (ref 8.6–10.5)
CHLORIDE SERPL-SCNC: 100 MMOL/L (ref 98–107)
CHLORIDE SERPL-SCNC: 100 MMOL/L (ref 98–107)
CO2 SERPL-SCNC: 21 MMOL/L (ref 22–29)
CO2 SERPL-SCNC: 25 MMOL/L (ref 22–29)
CREAT SERPL-MCNC: 0.61 MG/DL (ref 0.57–1)
CREAT SERPL-MCNC: 0.69 MG/DL (ref 0.57–1)
DEPRECATED RDW RBC AUTO: 44.7 FL (ref 37–54)
EGFRCR SERPLBLD CKD-EPI 2021: 90.6 ML/MIN/1.73
EGFRCR SERPLBLD CKD-EPI 2021: 93.4 ML/MIN/1.73
ERYTHROCYTE [DISTWIDTH] IN BLOOD BY AUTOMATED COUNT: 13.4 % (ref 12.3–15.4)
GLOBULIN UR ELPH-MCNC: 3.1 GM/DL
GLUCOSE SERPL-MCNC: 125 MG/DL (ref 65–99)
GLUCOSE SERPL-MCNC: 212 MG/DL (ref 65–99)
HCT VFR BLD AUTO: 32.9 % (ref 34–46.6)
HGB BLD-MCNC: 10.6 G/DL (ref 12–15.9)
MCH RBC QN AUTO: 29 PG (ref 26.6–33)
MCHC RBC AUTO-ENTMCNC: 32.2 G/DL (ref 31.5–35.7)
MCV RBC AUTO: 90.1 FL (ref 79–97)
PLATELET # BLD AUTO: 319 10*3/MM3 (ref 140–450)
PMV BLD AUTO: 11.2 FL (ref 6–12)
POTASSIUM SERPL-SCNC: 3.7 MMOL/L (ref 3.5–5.2)
POTASSIUM SERPL-SCNC: 4 MMOL/L (ref 3.5–5.2)
PROT SERPL-MCNC: 6.1 G/DL (ref 6–8.5)
RBC # BLD AUTO: 3.65 10*6/MM3 (ref 3.77–5.28)
SODIUM SERPL-SCNC: 136 MMOL/L (ref 136–145)
SODIUM SERPL-SCNC: 137 MMOL/L (ref 136–145)
WBC NRBC COR # BLD AUTO: 21.37 10*3/MM3 (ref 3.4–10.8)

## 2024-08-05 PROCEDURE — 99024 POSTOP FOLLOW-UP VISIT: CPT | Performed by: NURSE PRACTITIONER

## 2024-08-05 PROCEDURE — 36415 COLL VENOUS BLD VENIPUNCTURE: CPT | Performed by: SURGERY

## 2024-08-05 PROCEDURE — 80053 COMPREHEN METABOLIC PANEL: CPT | Performed by: SURGERY

## 2024-08-05 PROCEDURE — 85027 COMPLETE CBC AUTOMATED: CPT | Performed by: SURGERY

## 2024-08-05 PROCEDURE — 25010000002 PIPERACILLIN SOD-TAZOBACTAM PER 1 G: Performed by: SURGERY

## 2024-08-05 PROCEDURE — 71046 X-RAY EXAM CHEST 2 VIEWS: CPT

## 2024-08-05 PROCEDURE — 25810000003 SODIUM CHLORIDE 0.9 % SOLUTION: Performed by: SURGERY

## 2024-08-05 RX ORDER — TRAMADOL HYDROCHLORIDE 50 MG/1
50 TABLET ORAL EVERY 6 HOURS PRN
Status: DISCONTINUED | OUTPATIENT
Start: 2024-08-05 | End: 2024-08-07 | Stop reason: HOSPADM

## 2024-08-05 RX ORDER — LORATADINE 10 MG/1
10 TABLET ORAL DAILY
COMMUNITY

## 2024-08-05 RX ADMIN — FAMOTIDINE 40 MG: 20 TABLET, FILM COATED ORAL at 08:46

## 2024-08-05 RX ADMIN — SODIUM CHLORIDE 100 ML/HR: 9 INJECTION, SOLUTION INTRAVENOUS at 06:08

## 2024-08-05 RX ADMIN — PIPERACILLIN SODIUM AND TAZOBACTAM SODIUM 3.38 G: 3; .375 INJECTION, POWDER, LYOPHILIZED, FOR SOLUTION INTRAVENOUS at 13:10

## 2024-08-05 RX ADMIN — TRAMADOL HYDROCHLORIDE 50 MG: 50 TABLET ORAL at 22:29

## 2024-08-05 RX ADMIN — PIPERACILLIN SODIUM AND TAZOBACTAM SODIUM 3.38 G: 3; .375 INJECTION, POWDER, LYOPHILIZED, FOR SOLUTION INTRAVENOUS at 04:17

## 2024-08-05 NOTE — CASE MANAGEMENT/SOCIAL WORK
Continued Stay Note  DANIEL Kilgore     Patient Name: Esha Boogie  MRN: [id number]  Today's Date: 8/5/2024    Admit Date: 8/3/2024    Plan: Home   Discharge Plan       Row Name 08/05/24 1206       Plan    Plan Home    Patient/Family in Agreement with Plan yes    Plan Comments Spoke with pt to assess for d/c planning. Pt lives at home with spouse and plans same. Pt has RX coverage/PCP.  Pt is independent and no home needs identified. Will follow.                   Discharge Codes    No documentation.                       СВЕТЛАНА JohnsonW

## 2024-08-05 NOTE — PAYOR COMM NOTE
"Esha Quintana (74 y.o. Female)   [id number] admit 8/5   [facility] phone    fax       8/5 inpt order  8/3 and 8/4  observation          Date of Birth   1950    Social Security Number       Address   [address] [address]    Home Phone   [phone]    MRN   [id number]       Restorationism   Catholic    Marital Status                               Admission Date   8/3/24    Admission Type   Emergency    Admitting Provider   Pablo Walsh MD    Attending Provider   Pablo Walsh MD    Department, Room/Bed   [facility] 3C, 389/1       Discharge Date       Discharge Disposition       Discharge Destination                                 Attending Provider: Pablo Walsh MD    Allergies: Entresto [Sacubitril-valsartan], Farxiga [Dapagliflozin], Jardiance [Empagliflozin], Latex, Silicone    Isolation: None   Infection: None   Code Status: CPR    Ht: 147.3 cm (58\")   Wt: 79.8 kg (176 lb)    Admission Cmt: None   Principal Problem: Diverticulosis large intestine w/o perforation or abscess w/bleeding [K57.31]                   Active Insurance as of 8/3/2024       Primary Coverage       Payor Plan Insurance Group Employer/Plan Group    AETNA MEDICARE REPLACEMENT AETNA MEDICARE REPLACEMENT [id number]       Payor Plan Address Payor Plan Phone Number Payor Plan Fax Number Effective Dates    [address] [phone]  1/1/2023 - None Entered    [facility] [id number]         Subscriber Name Subscriber Birth Date Member ID       ESHA QUINTANA 1950 [id number]                     Emergency Contacts        (Rel.) Home Phone Work Phone Mobile Phone    Randal Quintana (Spouse) [phone] -- [phone]                 History & Physical        Pablo Walsh MD at 08/03/24 1835          General Surgery   History and Physical     Referring Provider: No ref. provider found    Patient Care " Team:  Charly Dominguez APRN as PCP - General (Nurse Practitioner)  Suman Jeffrey PA as Referring Physician (Family Medicine)  Emmanuel Fu MD as Cardiologist (Cardiology)  Kandis Snider APRN as Nurse Practitioner (Family Medicine)  Phuc Jones PA-C as Physician Assistant (Physician Assistant)  Mukund Franklin DO as Surgeon (Neurosurgery)  Gary Davis MD as Surgeon (General Surgery)    Chief complaint: Bloody diarrhea    Subjective      History of present illness:  The patient is a 74 y.o. female status post laparoscopic cholecystectomy but presents today with complaints of bloody diarrhea.   Esha D [place] today and associated nausea and vomiting x 1 today.  She also complains of left lower quadrant pain today.    Review of Systems    Review of Systems - General ROS: positive for  - fatigue and malaise  Respiratory ROS: no cough, shortness of breath, or wheezing  Cardiovascular ROS: no chest pain or dyspnea on exertion  Gastrointestinal ROS: positive for - abdominal pain, blood in stools, diarrhea, gas/bloating, and nausea/vomiting    History  Past Medical History:   Diagnosis Date    Arthritis     Bleeding tendency     CHF (congestive heart failure)     Diabetes mellitus     GERD (gastroesophageal reflux disease)     Gout     Hearing loss     HEARING AIDS    Hyperlipidemia     Hypertension     Peripheral vascular disease     PONV (postoperative nausea and vomiting)     Sleep apnea     Yeast infection     PRONE TO YEAST   ,   Past Surgical History:   Procedure Laterality Date    BREAST BIOPSY Right     CHOLECYSTECTOMY N/A 7/26/2024    Procedure: LAPAROSCOPIC CHOLECYSTECTOMY INTRAOPERATIVE CHOLANGIOGRAM WITH DAVINCI ROBOT;  Surgeon: Gary Davis MD;  Location: [facility];  Service: Robotics - DaVinci;  Laterality: N/A;    ENDOSCOPY  07/2023    HYSTERECTOMY      Partial    TOTAL HIP ARTHROPLASTY Bilateral     VARICOSE VEIN SURGERY Right 11/14/2022    Procedure: RIGHT SAPHENOUS VEIN RADIO  FREQUENCY ABLATION;  Surgeon: Serafin Sher DO;  Location:  [facility];  Service: Vascular;  Laterality: Right;    VARICOSE VEIN SURGERY Left 01/16/2023    Procedure: LEFT SAPHENOUS VEIN RADIO FREQUENCY ABLATION;  Surgeon: Serafin Sher DO;  Location:  [facility];  Service: Vascular;  Laterality: Left;   ,   Family History   Problem Relation Age of Onset    Stroke Mother     Cancer Father     Breast cancer Neg Hx    ,   Social History     Tobacco Use    Smoking status: Never    Smokeless tobacco: Never   Vaping Use    Vaping status: Never Used   Substance Use Topics    Alcohol use: Never    Drug use: Never   , (Not in a hospital admission)   and Allergies:  Entresto [sacubitril-valsartan], Farxiga [dapagliflozin], Jardiance [empagliflozin], Latex, and Silicone    Current Facility-Administered Medications:     Calcium Replacement - Follow Nurse / BPA Driven Protocol, , Does not apply, Nico COPE Anthony K, MD    dexAMETHasone (DECADRON) injection 4 mg, 4 mg, Intravenous, Q8H PRN, Pablo Walsh MD    diphenhydrAMINE (BENADRYL) injection 25 mg, 25 mg, Intravenous, Q6H PRN, Pablo Walsh MD    famotidine (PEPCID) tablet 40 mg, 40 mg, Oral, Daily, Pablo Walsh MD    Magnesium Standard Dose Replacement - Follow Nurse / BPA Driven Protocol, , Does not apply, Nico COPE Anthony K, MD    ondansetron (ZOFRAN) injection 4 mg, 4 mg, Intravenous, Q6H PRN, Pablo Walsh MD    Phosphorus Replacement - Follow Nurse / BPA Driven Protocol, , Does not apply, Nico COPE Anthony K, MD    Potassium Replacement - Follow Nurse / BPA Driven Protocol, , Does not apply, Nico COPE Anthony K, MD    sodium chloride 0.9 % bolus 500 mL, 500 mL, Intravenous, Once, Ning Valadez, CASSIDY    [COMPLETED] Insert Peripheral IV, , , Once **AND** sodium chloride 0.9 % flush 10 mL, 10 mL, Intravenous, PRN, Ning Valadez, APRN    sodium chloride 0.9 % flush 10 mL, 10 mL, Intravenous, Q12H, Pablo Walsh  MD JULIANNE    sodium chloride 0.9 % flush 10 mL, 10 mL, Intravenous, PRN, Pablo Walsh MD    sodium chloride 0.9 % infusion 40 mL, 40 mL, Intravenous, PRN, Pablo Walsh MD    sodium chloride 0.9 % infusion, 100 mL/hr, Intravenous, Continuous, Pablo Walsh MD    Current Outpatient Medications:     cloNIDine (CATAPRES) 0.1 MG tablet, Take 1 tablet by mouth Daily As Needed for High Blood Pressure., Disp: 30 tablet, Rfl: 11    gabapentin (NEURONTIN) 100 MG capsule, TAKE 1 CAPSULE BY MOUTH THREE TIMES A DAY, Disp: 90 capsule, Rfl: 0    gabapentin (NEURONTIN) 100 MG capsule, Take 1 capsule by mouth 3 (Three) Times a Day for 30 days., Disp: 90 capsule, Rfl: 0    hydroCHLOROthiazide 50 MG tablet, Take 1 tablet by mouth Daily., Disp: , Rfl:     lisinopril (PRINIVIL,ZESTRIL) 40 MG tablet, Take 1 tablet by mouth Daily., Disp: , Rfl:     Loratadine 10 MG capsule, Take 1 capsule by mouth Daily., Disp: , Rfl:     metoprolol succinate XL (TOPROL-XL) 100 MG 24 hr tablet, Take 1 tablet by mouth Daily., Disp: , Rfl:     Mounjaro 5 MG/0.5ML solution pen-injector pen, Inject 0.5 mL under the skin into the appropriate area as directed 1 (One) Time Per Week. WEDNESDAY, Disp: , Rfl:     multivitamin with minerals (PRESERVISION AREDS PO), Take 1 tablet by mouth Daily., Disp: , Rfl:     NON FORMULARY, Take 2 tablets by mouth Daily. ANCIENT NUTRITION COLLAGEN SUPPLEMENT, Disp: , Rfl:     nystatin (MYCOSTATIN) 423073 UNIT/GM powder, Apply 1 Application topically to the appropriate area as directed 2 (Two) Times a Day As Needed (yeast infection)., Disp: , Rfl:     omeprazole (priLOSEC) 20 MG capsule, Take 1 capsule by mouth Daily., Disp: , Rfl:     simvastatin (ZOCOR) 20 MG tablet, Take 1 tablet by mouth Every Night., Disp: , Rfl:     spironolactone (ALDACTONE) 50 MG tablet, Take 1 tablet by mouth Daily., Disp: , Rfl:     Objective     Vital Signs   Temp:  [97.6 °F (36.4 °C)] 97.6 °F (36.4 °C)  Heart Rate:  [75-86] 76  Resp:  [16-18]  18  BP: ()/(36-54) 118/54    Physical Exam:  Physical Exam  Constitutional:       General: She is not in acute distress.     Appearance: Normal appearance. She is obese. She is not ill-appearing, toxic-appearing or diaphoretic.   Cardiovascular:      Rate and Rhythm: Normal rate and regular rhythm.      Pulses: Normal pulses.      Heart sounds: Normal heart sounds.   Pulmonary:      Effort: Pulmonary effort is normal.      Breath sounds: Normal breath sounds.   Abdominal:      General: A surgical scar is present. Bowel sounds are decreased. There is distension.      Palpations: Abdomen is soft.      Tenderness: There is abdominal tenderness in the left lower quadrant.        Results Review:     Lab Results (last 24 hours)       Procedure Component Value Units Date/Time    Urinalysis With Culture If Indicated - Urine, Clean Catch [[id number]]  (Normal) Collected: 08/03/24 1753    Specimen: Urine, Clean Catch Updated: 08/03/24 1825     Color, UA Yellow     Appearance, UA Clear     pH, UA 5.5     Specific Gravity, UA >=1.030     Glucose, UA Negative     Ketones, UA Negative     Bilirubin, UA Negative     Blood, UA Negative     Protein, UA Negative     Leuk Esterase, UA Negative     Nitrite, UA Negative     Urobilinogen, UA 0.2 E.U./dL    Narrative:      In absence of clinical symptoms, the presence of pyuria, bacteria, and/or nitrites on the urinalysis result does not correlate with infection.  Urine microscopic not indicated.    CBC & Differential [[id number]]  (Abnormal) Collected: 08/03/24 1547    Specimen: Blood Updated: 08/03/24 1625    Narrative:      The following orders were created for panel order CBC & Differential.  Procedure                               Abnormality         Status                     ---------                               -----------         ------                     CBC Auto Differential[[id number]]        Abnormal            Final result                 Please view results for these  tests on the individual orders.    CBC Auto Differential [[id number]]  (Abnormal) Collected: 08/03/24 1547    Specimen: Blood Updated: 08/03/24 1625     WBC 22.68 10*3/mm3      RBC 3.87 10*6/mm3      Hemoglobin 11.5 g/dL      Hematocrit 34.3 %      MCV 88.6 fL      MCH 29.7 pg      MCHC 33.5 g/dL      RDW 13.5 %      RDW-SD 43.7 fl      MPV 10.5 fL      Platelets 356 10*3/mm3     Narrative:      The previously reported component NRBC is no longer being reported. Previous result was 0.0 /100 WBC (Reference Range: 0.0-0.2 /100 WBC) on 8/3/2024 at 1559 CDT.    Manual Differential [[id number]]  (Abnormal) Collected: 08/03/24 1547    Specimen: Blood Updated: 08/03/24 1625     Neutrophil % 83.3 %      Lymphocyte % 6.9 %      Monocyte % 2.0 %      Eosinophil % 1.0 %      Basophil % 0.0 %      Bands %  2.9 %      Metamyelocyte % 2.0 %      Myelocyte % 2.0 %      Neutrophils Absolute 19.57 10*3/mm3      Lymphocytes Absolute 1.56 10*3/mm3      Monocytes Absolute 0.45 10*3/mm3      Eosinophils Absolute 0.23 10*3/mm3      Basophils Absolute 0.00 10*3/mm3      Polychromasia Slight/1+     Vacuolated Neutrophils Slight/1+     Platelet Morphology Normal    Comprehensive Metabolic Panel [[id number]]  (Abnormal) Collected: 08/03/24 1547    Specimen: Blood Updated: 08/03/24 1622     Glucose 148 mg/dL      BUN 35 mg/dL      Creatinine 1.02 mg/dL      Sodium 130 mmol/L      Potassium 4.4 mmol/L      Comment: Slight hemolysis detected by analyzer. Result may be falsely elevated.        Chloride 96 mmol/L      CO2 23.0 mmol/L      Calcium 9.9 mg/dL      Total Protein 6.7 g/dL      Albumin 3.4 g/dL      ALT (SGPT) 24 U/L      AST (SGOT) 16 U/L      Comment: Slight hemolysis detected by analyzer. Result may be falsely elevated.        Alkaline Phosphatase 78 U/L      Total Bilirubin 0.2 mg/dL      Globulin 3.3 gm/dL      A/G Ratio 1.0 g/dL      BUN/Creatinine Ratio 34.3     Anion Gap 11.0 mmol/L      eGFR 57.5 mL/min/1.73     Narrative:       GFR Normal >60  Chronic Kidney Disease <60  Kidney Failure <15    The GFR formula is only valid for adults with stable renal function between ages 18 and 70.    Magnesium [[id number]]  (Normal) Collected: 08/03/24 1547    Specimen: Blood Updated: 08/03/24 1622     Magnesium 1.6 mg/dL     Lactic Acid, Plasma [[id number]]  (Normal) Collected: 08/03/24 1547    Specimen: Blood Updated: 08/03/24 1611     Lactate 1.4 mmol/L     Lipase [[id number]]  (Normal) Collected: 08/03/24 1547    Specimen: Blood Updated: 08/03/24 1610     Lipase 30 U/L     Protime-INR [[id number]]  (Normal) Collected: 08/03/24 1547    Specimen: Blood Updated: 08/03/24 1607     Protime 13.6 Seconds      INR 1.00    aPTT [[id number]]  (Normal) Collected: 08/03/24 1547    Specimen: Blood Updated: 08/03/24 1607     PTT 27.7 seconds           Imaging Results (Last 24 Hours)       Procedure Component Value Units Date/Time    CT Abdomen Pelvis With Contrast [[id number]] Collected: 08/03/24 1713     Updated: 08/03/24 1721    Narrative:      EXAM: CT ABDOMEN PELVIS W CONTRAST-      DATE: 8/3/2024 3:10 PM     HISTORY: abdominal pain, recent post op gallbladder and ERCP, now bloody  stool       COMPARISON: None available.     DOSE LENGTH PRODUCT: 437.61 mGy.cm Automatic exposure control was  utilized to make radiation dose as low as reasonably achievable.     TECHNIQUE: Contrast enhanced axial images of the abdomen and pelvis  obtained with multiplanar reformats.     FINDINGS:  VISUALIZED CHEST: No pericardial effusion is seen. There are trace  bilateral pleural effusions and atelectasis.     LIVER/BILE DUCTS: The patient is status post cholecystectomy. There is a  common bile duct stent. Mild focal fatty infiltration of the liver is  noted along the falciform ligament. Fluid and stranding at the  gallbladder fossa is likely postoperative. No localized fluid collection  is seen. Portal vein branches are patent.     KIDNEYS/URETERS: The bilateral kidneys and  visualized ureters are  unremarkable. There is no hydronephrosis.     ADRENAL: Unremarkable.     SPLEEN: Unremarkable.     PANCREAS: Unremarkable.     MESENTERY: Again there are postoperative changes of the mesentery at the  right upper quadrant. No lymphadenopathy or free fluid is seen.     VASCULATURE: There is atherosclerosis of the abdominal aorta without  aneurysm formation. Vasculature is intact without stenosis or occlusion.     GI TRACT: There is no bowel obstruction. The abdominal portion of the GI  tract is unremarkable.     PELVIS: There is diverticulosis of the sigmoid colon without definite  acute diverticulitis. Extensive metallic streak artifact from bilateral  total hip arthroplasty somewhat obscures visualization of the pelvis. No  lymphadenopathy or free fluid is seen.     SOFT TISSUES: There are postoperative changes of the subcutaneous soft  tissues anteriorly.     BONES: No acute or aggressive bony lesion.           Impression:      1. Postoperative change of recent cholecystectomy. There is also a  common bile duct stent.  2. Sigmoid colon diverticulosis without visualized diverticulitis.  3. Visualization of the pelvis somewhat obscured due to metallic streak  artifact from bilateral total hip arthroplasty.  4. Small bilateral pleural effusions with atelectasis.        This report was signed and finalized on 8/3/2024 5:17 PM by Amauri Bolanos.               ASSESSMENT/PLAN   Diagnosis Plan   1. Bloody diarrhea             Diverticulosis large intestine w/o perforation or abscess w/bleeding    Patient does have complaints of left lower quadrant pain and I will initiate antibiotics to cover possible diverticulitis.  Also due to her leukocytosis findings.  She will have H&H's serially to assess status due to the multiple bloody bowel movements per her history.  She will be admitted for observation status initially and be reassessed in a.m.  Plan and course was explained to the patient and her   and their questions and concerns were addressed currently.    Pablo Walsh MD  08/03/24  18:35 CDT              Electronically signed by Pablo Walsh MD at 08/03/24 1906          Emergency Department Notes        Nikhil Gomez, RN at 08/03/24 1900          Report given to rivera BANERJEE RN.     Nursing report ED to floor  Esha Boogie  74 y.o.  female    HPI:   Chief Complaint   Patient presents with    Black or Bloody Stool       Admitting doctor:   Pablo Walsh MD    Consulting provider(s):  Consults       No orders found from 7/5/2024 to 8/4/2024.             Admitting diagnosis:   The encounter diagnosis was Bloody diarrhea.    Code status:   Current Code Status       Date Active Code Status Order ID Comments User Context       Prior            Allergies:   Entresto [sacubitril-valsartan], Farxiga [dapagliflozin], Jardiance [empagliflozin], Latex, and Silicone    Intake and Output  No intake or output data in the 24 hours ending 08/03/24 1900    Weight:       08/03/24  1514   Weight: 79.8 kg (176 lb)       Most recent vitals:   Vitals:    08/03/24 1553 08/03/24 1650 08/03/24 1653 08/03/24 1805   BP: (!) 87/36 107/47 106/44 118/54   Pulse: 75 86 85 76   Resp:  18  18   Temp:       TempSrc:       SpO2: 95% 96% 96% 98%   Weight:       Height:         Oxygen Therapy: .    Active LDAs/IV Access:   Lines, Drains & Airways       Active LDAs       Name Placement date Placement time Site Days    Peripheral IV 07/29/24 1900 Anterior;Left;Proximal Forearm 07/29/24  1900  Forearm  5    Peripheral IV 08/03/24 1554 Posterior;Right Hand 08/03/24  1554  Hand  less than 1                    Labs (abnormal labs have a star):   Labs Reviewed   COMPREHENSIVE METABOLIC PANEL - Abnormal; Notable for the following components:       Result Value    Glucose 148 (*)     BUN 35 (*)     Creatinine 1.02 (*)     Sodium 130 (*)     Chloride 96 (*)     Albumin 3.4 (*)     BUN/Creatinine Ratio 34.3 (*)     eGFR 57.5  (*)     All other components within normal limits    Narrative:     GFR Normal >60  Chronic Kidney Disease <60  Kidney Failure <15    The GFR formula is only valid for adults with stable renal function between ages 18 and 70.   CBC WITH AUTO DIFFERENTIAL - Abnormal; Notable for the following components:    WBC 22.68 (*)     Hemoglobin 11.5 (*)     All other components within normal limits    Narrative:     The previously reported component NRBC is no longer being reported. Previous result was 0.0 /100 WBC (Reference Range: 0.0-0.2 /100 WBC) on 8/3/2024 at 1559 CDT.   MANUAL DIFFERENTIAL - Abnormal; Notable for the following components:    Neutrophil % 83.3 (*)     Lymphocyte % 6.9 (*)     Monocyte % 2.0 (*)     Metamyelocyte % 2.0 (*)     Myelocyte % 2.0 (*)     Neutrophils Absolute 19.57 (*)     All other components within normal limits   PROTIME-INR - Normal   APTT - Normal   LIPASE - Normal   URINALYSIS W/ CULTURE IF INDICATED - Normal    Narrative:     In absence of clinical symptoms, the presence of pyuria, bacteria, and/or nitrites on the urinalysis result does not correlate with infection.  Urine microscopic not indicated.   LACTIC ACID, PLASMA - Normal   MAGNESIUM - Normal   BASIC METABOLIC PANEL   POCT OCCULT BLOOD STOOL   TYPE AND SCREEN   CBC AND DIFFERENTIAL    Narrative:     The following orders were created for panel order CBC & Differential.  Procedure                               Abnormality         Status                     ---------                               -----------         ------                     CBC Auto Differential[[id number]]        Abnormal            Final result                 Please view results for these tests on the individual orders.       Meds given in ED:   Medications   sodium chloride 0.9 % flush 10 mL (has no administration in time range)   sodium chloride 0.9 % bolus 500 mL (has no administration in time range)   sodium chloride 0.9 % flush 10 mL (has no  administration in time range)   sodium chloride 0.9 % flush 10 mL (has no administration in time range)   sodium chloride 0.9 % infusion 40 mL (has no administration in time range)   sodium chloride 0.9 % infusion (has no administration in time range)   Potassium Replacement - Follow Nurse / BPA Driven Protocol (has no administration in time range)   Magnesium Standard Dose Replacement - Follow Nurse / BPA Driven Protocol (has no administration in time range)   Phosphorus Replacement - Follow Nurse / BPA Driven Protocol (has no administration in time range)   Calcium Replacement - Follow Nurse / BPA Driven Protocol (has no administration in time range)   famotidine (PEPCID) tablet 40 mg (has no administration in time range)   ondansetron (ZOFRAN) injection 4 mg (has no administration in time range)   diphenhydrAMINE (BENADRYL) injection 25 mg (has no administration in time range)   dexAMETHasone (DECADRON) injection 4 mg (has no administration in time range)   iopamidol (ISOVUE-300) 61 % injection 100 mL (100 mL Intravenous Given 8/3/24 1635)     sodium chloride, 100 mL/hr         NIH Stroke Scale:       Isolation/Infection(s):  No active isolations   No active infections     COVID Testing  Collected .  Resulted .    Nursing report ED to floor:  Mental status: .  Ambulatory status: .  Precautions: .    ED nurse phone extentsion- ..       Electronically signed by Nikhil Gomez RN at 08/03/24 1900       Ning Valadez APRN at 08/03/24 1536       Attestation signed by Nayely Park DO at 08/03/24 2202        SUPERVISE: For this patient encounter, I reviewed the APC's documentation, treatment plan, and medical decision making.  Nayely Park DO 8/3/2024 22:02 CDT                         Subjective   History of Present Illness  Patient is a 74-year-old female that presents to the emergency department for bloody diarrhea.  Patient had a laparoscopic cholecystectomy performed by Dr. Davis on 7/26/2024  and then ERCP with stone removal and stent placement with Dr. Franklin on 7/29/2024.  Patient was discharged home from this facility on Tuesday morning.  She states that since then she has been experiencing generalized abdominal pain but abdominal pain became severe on Thursday and has been intermittently severe since then.   states that patient has not had a bowel movement since for surgery that was performed on 7/26.  He states patient did have her first bowel movement today around 5 AM.  She had 3 good bowel movements this morning that were softer in consistency but not diarrhea.  He states that he then gave patient a stool softener and patient has had 4 bowel movements of diarrhea since then that also had bright red bleeding noted in the toilet and when wiping.  Patient denies any anticoagulation use.  Denies any fever but has had bodyaches and chills.  Denies any vomiting but has had nausea this morning.  Denies any shortness of breath, chest pain, lightheadedness, dizziness, blurred vision, headache or near syncope.  Denies any urinary urgency, frequency, retention or dysuria.      Review of Systems   Gastrointestinal:  Positive for abdominal pain, blood in stool, diarrhea, nausea and vomiting.   All other systems reviewed and are negative.      Past Medical History:   Diagnosis Date    Arthritis     Bleeding tendency     CHF (congestive heart failure)     Diabetes mellitus     GERD (gastroesophageal reflux disease)     Gout     Hearing loss     HEARING AIDS    Hyperlipidemia     Hypertension     Peripheral vascular disease     PONV (postoperative nausea and vomiting)     Sleep apnea     Yeast infection     PRONE TO YEAST       Allergies   Allergen Reactions    Entresto [Sacubitril-Valsartan] Itching and Rash     YEAST    Farxiga [Dapagliflozin] Itching     Itching and yeast infection     Jardiance [Empagliflozin] Itching    Latex Rash    Silicone Rash       Past Surgical History:   Procedure Laterality  Date    BREAST BIOPSY Right     CHOLECYSTECTOMY N/A 7/26/2024    Procedure: LAPAROSCOPIC CHOLECYSTECTOMY INTRAOPERATIVE CHOLANGIOGRAM WITH DAVINCI ROBOT;  Surgeon: Gary Davis MD;  Location: [facility];  Service: Robotics - DaVinci;  Laterality: N/A;    ENDOSCOPY  07/2023    HYSTERECTOMY      Partial    TOTAL HIP ARTHROPLASTY Bilateral     VARICOSE VEIN SURGERY Right 11/14/2022    Procedure: RIGHT SAPHENOUS VEIN RADIO FREQUENCY ABLATION;  Surgeon: Serafin Sher DO;  Location:  [facility];  Service: Vascular;  Laterality: Right;    VARICOSE VEIN SURGERY Left 01/16/2023    Procedure: LEFT SAPHENOUS VEIN RADIO FREQUENCY ABLATION;  Surgeon: Serafin Sher DO;  Location:  [facility];  Service: Vascular;  Laterality: Left;       Family History   Problem Relation Age of Onset    Stroke Mother     Cancer Father     Breast cancer Neg Hx        Social History     Socioeconomic History    Marital status:    Tobacco Use    Smoking status: Never    Smokeless tobacco: Never   Vaping Use    Vaping status: Never Used   Substance and Sexual Activity    Alcohol use: Never    Drug use: Never    Sexual activity: Defer           Objective   Physical Exam  Vitals and nursing note reviewed.   Constitutional:       Appearance: She is ill-appearing.      Comments: Ill-appearing on arrival. In no acute distress.    HENT:      Head: Normocephalic and atraumatic.      Right Ear: External ear normal.      Left Ear: External ear normal.      Nose: Nose normal.      Mouth/Throat:      Mouth: Mucous membranes are moist.      Pharynx: Oropharynx is clear.   Eyes:      Extraocular Movements: Extraocular movements intact.      Conjunctiva/sclera: Conjunctivae normal.      Pupils: Pupils are equal, round, and reactive to light.   Cardiovascular:      Rate and Rhythm: Normal rate and regular rhythm.      Pulses: Normal pulses.      Heart sounds: Normal heart sounds.   Pulmonary:      Effort: Pulmonary effort is  normal. No respiratory distress.      Breath sounds: Normal breath sounds. No wheezing.   Chest:      Chest wall: No tenderness.   Abdominal:      General: Abdomen is protuberant. Bowel sounds are normal. There is no distension.      Palpations: Abdomen is soft.      Tenderness: There is generalized abdominal tenderness. There is no right CVA tenderness, left CVA tenderness, guarding or rebound.      Comments: Surgical incisions are still dressed with Steri-Strips.  No obvious erythema, drainage or edema noted.   Musculoskeletal:         General: Normal range of motion.      Cervical back: Normal range of motion and neck supple.      Right lower leg: No edema.      Left lower leg: No edema.   Skin:     General: Skin is warm and dry.      Capillary Refill: Capillary refill takes less than 2 seconds.   Neurological:      General: No focal deficit present.      Mental Status: She is alert and oriented to person, place, and time. Mental status is at baseline.   Psychiatric:         Mood and Affect: Mood normal.         Behavior: Behavior normal.         Thought Content: Thought content normal.         Judgment: Judgment normal.       Labs Reviewed   COMPREHENSIVE METABOLIC PANEL - Abnormal; Notable for the following components:       Result Value    Glucose 148 (*)     BUN 35 (*)     Creatinine 1.02 (*)     Sodium 130 (*)     Chloride 96 (*)     Albumin 3.4 (*)     BUN/Creatinine Ratio 34.3 (*)     eGFR 57.5 (*)     All other components within normal limits    Narrative:     GFR Normal >60  Chronic Kidney Disease <60  Kidney Failure <15    The GFR formula is only valid for adults with stable renal function between ages 18 and 70.   CBC WITH AUTO DIFFERENTIAL - Abnormal; Notable for the following components:    WBC 22.68 (*)     Hemoglobin 11.5 (*)     All other components within normal limits    Narrative:     The previously reported component NRBC is no longer being reported. Previous result was 0.0 /100 WBC (Reference  Range: 0.0-0.2 /100 WBC) on 8/3/2024 at 1559 CDT.   MANUAL DIFFERENTIAL - Abnormal; Notable for the following components:    Neutrophil % 83.3 (*)     Lymphocyte % 6.9 (*)     Monocyte % 2.0 (*)     Metamyelocyte % 2.0 (*)     Myelocyte % 2.0 (*)     Neutrophils Absolute 19.57 (*)     All other components within normal limits   PROTIME-INR - Normal   APTT - Normal   LIPASE - Normal   URINALYSIS W/ CULTURE IF INDICATED - Normal    Narrative:     In absence of clinical symptoms, the presence of pyuria, bacteria, and/or nitrites on the urinalysis result does not correlate with infection.  Urine microscopic not indicated.   LACTIC ACID, PLASMA - Normal   MAGNESIUM - Normal   BASIC METABOLIC PANEL   POCT OCCULT BLOOD STOOL   TYPE AND SCREEN   CBC AND DIFFERENTIAL    Narrative:     The following orders were created for panel order CBC & Differential.  Procedure                               Abnormality         Status                     ---------                               -----------         ------                     CBC Auto Differential[[id number]]        Abnormal            Final result                 Please view results for these tests on the individual orders.      CT Abdomen Pelvis With Contrast   Final Result   1. Postoperative change of recent cholecystectomy. There is also a   common bile duct stent.   2. Sigmoid colon diverticulosis without visualized diverticulitis.   3. 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   4. Small bilateral pleural effusions with atelectasis.           This report was signed and finalized on 8/3/2024 5:17 PM by Amauri Bolanos.               Procedures          ED Course  ED Course as of 08/03/24 1904   Sat Aug 03, 2024   1819 Occult stool positive. [KF]      ED Course User Index  [KF] Ning Valadez, APRN                                             Medical Decision Making  Esha Boogie is a 74  y.o. female who presents to the ED for bloody diarrhea.  Patient had a laparoscopic cholecystectomy performed by Dr. Davis on 7/26/2024 and then ERCP with stone removal and stent placement with Dr. Franklin on 7/29/2024.  Patient was discharged home from this facility on Tuesday morning.  She states that since then she has been experiencing generalized abdominal pain but abdominal pain became severe on Thursday and has been intermittently severe since then.   states that patient has not had a bowel movement since for surgery that was performed on 7/26.  He states patient did have her first bowel movement today around 5 AM.  She had 3 good bowel movements this morning that were softer in consistency but not diarrhea.  He states that he then gave patient a stool softener and patient has had 4 bowel movements of diarrhea since then that also had bright red bleeding noted in the toilet and when wiping.  Patient denies any anticoagulation use.  Denies any fever but has had bodyaches and chills.  Denies any vomiting but has had nausea this morning.  Denies any shortness of breath, chest pain, lightheadedness, dizziness, blurred vision, headache or near syncope.  Denies any urinary urgency, frequency, retention or dysuria.    Patient ill-appearing on arrival. No acute distress was noted.  Vital signs stable.     Patient's presentation raises suspicion for differentials including, but not limited to, GI bleed, postop complication, hemorrhoid, anemia, obstruction.    Past medical history, surgical history, and medication regimen reviewed.     Previous notes, labs, imaging and more reviewed.    Patient was administered 500ml bolus of NS while in the ED.    Please refer to above section of note for lab and imaging results that were reviewed and interpreted by radiology as well as attending physician.     Given findings described above, patient's presentation is likely consistent with bloody diarrhea.     Spoke with   Nico, general surgeon on-call regarding patient's presentation.  He evaluated patient in the ED and will admit patient to his service.    I reassessed the patient and discussed the findings of the work up so far with everyone in the room. I said that the next step in treatment would be admission to the hospital for further workup and care. I also said that there is always some diagnostic uncertainty in the ER, that symptoms may change, and new things may be found after being admitted. Dr. Walsh's service assumed primary care of the patient and the patient was admitted to their service in stable condition.    Dragon disclaimer:  Parts of this note may be an electronic transcription/translation of spoken language to printed text using the Dragon dictation system.       Problems Addressed:  Bloody diarrhea: complicated acute illness or injury    Amount and/or Complexity of Data Reviewed  Labs: ordered.  Radiology: ordered.    Risk  Prescription drug management.  Decision regarding hospitalization.        Final diagnoses:   Bloody diarrhea       ED Disposition  ED Disposition       ED Disposition   Decision to Admit    Condition   --    Comment   Level of Care: Med/Surg [1]   Diagnosis: Diverticulosis large intestine w/o perforation or abscess w/bleeding [[id number]]   Admitting Physician: SIMONA WALSH [[id number]]                 No follow-up provider specified.       Medication List      No changes were made to your prescriptions during this visit.            Ning Valadez, APRN  08/03/24 1904      Electronically signed by Nayely Park DO at 08/03/24 2202       Vital Signs (last 3 days)       Date/Time Temp Temp src Pulse Resp BP Patient Position SpO2    08/05/24 1134 98.5 (36.9) Oral 79 18 114/52 Lying 94    08/05/24 0741 97.9 (36.6) Oral 72 18 116/58 Lying 94    08/05/24 0409 98.2 (36.8) Oral 78 16 142/53 Lying 98    08/04/24 1953 98.5 (36.9) Oral 88 16 128/55 Lying 96    08/04/24 1535 98.6 (37) Oral 80 16  110/34 Lying 99    08/04/24 1136 98.3 (36.8) Oral 77 16 113/40 Lying 97    08/04/24 0728 97.6 (36.4) Oral 73 16 104/41 Lying 97    08/04/24 0414 98.5 (36.9) Oral 75 18 112/40 Lying 94    08/03/24 2342 97.5 (36.4) Oral 72 18 106/43 Lying 95    08/03/24 1926 97.6 (36.4) Oral 77 18 142/48 Lying 96    08/03/24 1805 -- -- 76 18 118/54 -- 98    08/03/24 1653 -- -- 85 -- 106/44 -- 96    08/03/24 1650 -- -- 86 18 107/47 -- 96    08/03/24 1553 -- -- 75 -- 87/36 -- 95    08/03/24 1516 97.6 (36.4) Oral -- -- -- -- --    08/03/24 1514 -- -- 83 16 105/48 -- 97          Current Facility-Administered Medications   Medication Dose Route Frequency Provider Last Rate Last Admin    Calcium Replacement - Follow Nurse / BPA Driven Protocol   Does not apply PRN Pablo Walsh MD        diphenhydrAMINE (BENADRYL) injection 25 mg  25 mg Intravenous Q6H PRN Pablo Walsh MD        famotidine (PEPCID) tablet 40 mg  40 mg Oral Daily Pablo Walsh MD   40 mg at 08/05/24 0846    Magnesium Standard Dose Replacement - Follow Nurse / BPA Driven Protocol   Does not apply PRN Pablo Walsh MD        ondansetron (ZOFRAN) injection 4 mg  4 mg Intravenous Q6H PRN Pablo Walsh MD        Phosphorus Replacement - Follow Nurse / BPA Driven Protocol   Does not apply PRN Pablo Walsh MD        piperacillin-tazobactam (ZOSYN) 3.375 g IVPB in 100 mL NS MBP (CD)  3.375 g Intravenous Q8H Pablo Walsh MD 0 mL/hr at 08/05/24 0030 3.375 g at 08/05/24 1310    Potassium Replacement - Follow Nurse / BPA Driven Protocol   Does not apply PRN Pablo Walsh MD        sodium chloride 0.9 % flush 10 mL  10 mL Intravenous PRN Ning Valadez APRN        sodium chloride 0.9 % flush 10 mL  10 mL Intravenous Q12H Pablo Walsh MD   10 mL at 08/04/24 2036    sodium chloride 0.9 % flush 10 mL  10 mL Intravenous PRN Pablo Walsh MD        sodium chloride 0.9 % infusion 40 mL  40 mL Intravenous PRN Pablo Walsh MD        sodium  "chloride 0.9 % infusion  100 mL/hr Intravenous Continuous Pablo Walsh  mL/hr at 08/05/24 0608 100 mL/hr at 08/05/24 0608        Physician Progress Notes (last 48 hours)        Pablo Walsh MD at 08/05/24 0855          Patient Care Team:  Charly Dominguez APRN as PCP - General (Nurse Practitioner)  Suman Jeffrey PA as Referring Physician (Family Medicine)  Emmanuel Fu MD as Cardiologist (Cardiology)  Kandis Snider APRN as Nurse Practitioner (Family Medicine)  Phuc Jones PA-C as Physician Assistant (Physician Assistant)  Mukund Franklin DO as Surgeon (Neurosurgery)  Gary Davis MD as Surgeon (General Surgery)    Subjective     Esha Galeanowood feeling better.  She stated this symptom has improved last night with no diarrhea and more formed bowel movements.  Her pain also has improved since last night.  She denies nausea or vomiting.  Tolerating clears.       Review of Systems:     Review of Systems - General ROS: negative  Respiratory ROS: no cough, shortness of breath, or wheezing  Cardiovascular ROS: no chest pain or dyspnea on exertion  Gastrointestinal ROS: no abdominal pain, change in bowel habits, or black or bloody stools    Objective     Vital Signs  /52 (BP Location: Left arm, Patient Position: Lying)   Pulse 79   Temp 98.5 °F (36.9 °C) (Oral)   Resp 18   Ht 147.3 cm (58\")   Wt 79.8 kg (176 lb)   SpO2 94%   BMI 36.78 kg/m²     Physical Exam:  Physical Exam  Constitutional:       General: She is not in acute distress.     Appearance: Normal appearance. She is obese. She is not ill-appearing or diaphoretic.   Abdominal:      General: A surgical scar is present. Bowel sounds are normal. There is no distension.      Palpations: Abdomen is soft.      Tenderness: There is no abdominal tenderness. There is no guarding or rebound.   Musculoskeletal:         General: No swelling or tenderness.   Neurological:      Mental Status: She is alert.           Results " Review:    Labs reviewed    CBC          8/3/2024    15:47 8/3/2024    22:51 8/4/2024    07:32 8/4/2024    12:23 8/5/2024    09:33   CBC   WBC 22.68   20.11   21.37    RBC 3.87   3.69   3.65    Hemoglobin 11.5  11.0  10.7     10.7  11.0  10.6    Hematocrit 34.3  33.4  33.1     33.3  33.2  32.9    MCV 88.6   90.2   90.1    MCH 29.7   29.0   29.0    MCHC 33.5   32.1   32.2    RDW 13.5   13.5   13.4    Platelets 356   308   319         Medication Reviewed:   Current Facility-Administered Medications   Medication Dose Route Frequency Provider Last Rate Last Admin    Calcium Replacement - Follow Nurse / BPA Driven Protocol   Does not apply PRPablo Kelly MD        dexAMETHasone (DECADRON) injection 4 mg  4 mg Intravenous Q8H PRN Pablo Walsh MD        diphenhydrAMINE (BENADRYL) injection 25 mg  25 mg Intravenous Q6H PRN Pablo Walsh MD        famotidine (PEPCID) tablet 40 mg  40 mg Oral Daily Pablo Walsh MD   40 mg at 08/05/24 0846    Magnesium Standard Dose Replacement - Follow Nurse / BPA Driven Protocol   Does not apply PRPablo Kelly MD        ondansetron (ZOFRAN) injection 4 mg  4 mg Intravenous Q6H PRN Pablo Walsh MD        Phosphorus Replacement - Follow Nurse / BPA Driven Protocol   Does not apply PRPablo Kelly MD        piperacillin-tazobactam (ZOSYN) 3.375 g IVPB in 100 mL NS MBP (CD)  3.375 g Intravenous Q8H Pablo Walsh MD 0 mL/hr at 08/05/24 0030 Currently Infusing at 08/05/24 0642    Potassium Replacement - Follow Nurse / BPA Driven Protocol   Does not apply PRPablo Kelly MD        sodium chloride 0.9 % flush 10 mL  10 mL Intravenous PRN Ning Valadez APRN        sodium chloride 0.9 % flush 10 mL  10 mL Intravenous Q12H Pablo Walsh MD   10 mL at 08/04/24 2036    sodium chloride 0.9 % flush 10 mL  10 mL Intravenous PRN Pablo Walsh MD        sodium chloride 0.9 % infusion 40 mL  40 mL Intravenous PRN Pablo Walsh MD        sodium  chloride 0.9 % infusion  100 mL/hr Intravenous Continuous Pablo Walsh  mL/hr at 08/05/24 0608 100 mL/hr at 08/05/24 0608       Assessment & Plan     H&H has remained stable as well as the patient's status.  Will continue treatment for diverticulitis also due to her left lower quadrant pain which also has improved.  Will advance diet today.  Possible discharge home soon.    Addendum: White count still remains elevated.  Patient does have a history of pleural effusions found on CT as well.  Possible intra-abdominal infection but improving of her symptoms.  Will order a chest x-ray to assess the progression of pulmonary status.  Patient is satting 90+ percent on room air and without evidence of respiratory distress.          Pablo Walsh MD  08/05/24  12:21 CDT      Electronically signed by Pablo Walsh MD at 08/05/24 1223       Pablo Walsh MD at 08/04/24 0730          Patient Care Team:  Charly Dominguez APRN as PCP - General (Nurse Practitioner)  Suman Jeffrey PA as Referring Physician (Family Medicine)  Emmanuel Fu MD as Cardiologist (Cardiology)  Kandis Snider APRN as Nurse Practitioner (Family Medicine)  Phuc Jones PA-C as Physician Assistant (Physician Assistant)  Mukund Franklin DO as Surgeon (Neurosurgery)  Gary Davis MD as Surgeon (General Surgery)    Subjective     Esha Boogie is yesterday from complaints of bloody diarrhea.  She stated this symptom has improved last night with decreased blood-tinged stool.  Her pain also has improved since last night.  She denies nausea or vomiting.       Review of Systems:     Review of Systems - General ROS: negative  Respiratory ROS: no cough, shortness of breath, or wheezing  Cardiovascular ROS: no chest pain or dyspnea on exertion  Gastrointestinal ROS: no abdominal pain, change in bowel habits, or black or bloody stools  positive for -diarrhea with less blood    Objective     Vital Signs  /41 (BP  "Location: Left arm, Patient Position: Lying)   Pulse 73   Temp 97.6 °F (36.4 °C) (Oral)   Resp 16   Ht 147.3 cm (58\")   Wt 79.8 kg (176 lb)   SpO2 97%   BMI 36.78 kg/m²     Physical Exam:  Physical Exam  Constitutional:       General: She is not in acute distress.     Appearance: Normal appearance. She is obese. She is not ill-appearing or diaphoretic.   Abdominal:      General: Bowel sounds are normal. There is no distension.      Palpations: Abdomen is soft.      Tenderness: There is abdominal tenderness. There is no guarding or rebound.   Musculoskeletal:         General: No swelling or tenderness.   Neurological:      Mental Status: She is alert.           Results Review:    Labs reviewed    CBC          7/27/2024    05:08 7/28/2024    13:48 8/3/2024    15:47 8/3/2024    22:51   CBC   WBC 20.30  14.01  22.68     RBC 4.14  3.87  3.87     Hemoglobin 12.1  11.3  11.5  11.0    Hematocrit 37.2  35.3  34.3  33.4    MCV 89.9  91.2  88.6     MCH 29.2  29.2  29.7     MCHC 32.5  32.0  33.5     RDW 13.0  13.1  13.5     Platelets 223  174  356            Medication Reviewed:   Current Facility-Administered Medications   Medication Dose Route Frequency Provider Last Rate Last Admin    Calcium Replacement - Follow Nurse / BPA Driven Protocol   Does not apply PRPablo Kelly MD        dexAMETHasone (DECADRON) injection 4 mg  4 mg Intravenous Q8H PRN Pablo Walsh MD        diphenhydrAMINE (BENADRYL) injection 25 mg  25 mg Intravenous Q6H PRN Pablo Walsh MD        famotidine (PEPCID) tablet 40 mg  40 mg Oral Daily Pablo Walsh MD   40 mg at 08/03/24 2200    HYDROmorphone (DILAUDID) injection 0.5 mg  0.5 mg Intravenous Q4H PRN Pablo Walsh MD   0.5 mg at 08/03/24 2144    Magnesium Standard Dose Replacement - Follow Nurse / BPA Driven Protocol   Does not apply Pablo Goldman MD        ondansetron (ZOFRAN) injection 4 mg  4 mg Intravenous Q6H PRN Pablo Walsh MD        Phosphorus " Replacement - Follow Nurse / BPA Driven Protocol   Does not apply PRN Pablo Walsh MD        piperacillin-tazobactam (ZOSYN) 3.375 g IVPB in 100 mL NS MBP (CD)  3.375 g Intravenous Q8H Pablo Walsh MD   3.375 g at 08/04/24 0351    Potassium Replacement - Follow Nurse / BPA Driven Protocol   Does not apply PRN Pablo Walsh MD        sodium chloride 0.9 % flush 10 mL  10 mL Intravenous PRN Ning Valadez APRN        sodium chloride 0.9 % flush 10 mL  10 mL Intravenous Q12H Pablo Walsh MD        sodium chloride 0.9 % flush 10 mL  10 mL Intravenous PRN Pablo Walsh MD        sodium chloride 0.9 % infusion 40 mL  40 mL Intravenous PRN Pablo Walsh MD        sodium chloride 0.9 % infusion  100 mL/hr Intravenous Continuous Pablo Walsh  mL/hr at 08/03/24 2009 100 mL/hr at 08/03/24 2009       Assessment & Plan  POD today from bloody diarrhea.  H&H has remained stable as well as the patient's status.  Will continue treatment for diverticulitis also due to her left lower quadrant pain which also has improved.  Will start her on clears today and reassess status and GI function.      Pablo Walsh MD  08/04/24  07:30 CDT      Electronically signed by Pablo Walsh MD at 08/04/24 0801       Consult Notes (last 48 hours)  Notes from 08/03/24 1315 through 08/05/24 1315   No notes of this type exist for this encounter.

## 2024-08-05 NOTE — PROGRESS NOTES
"Patient Care Team:  Charly Dominguez APRN as PCP - General (Nurse Practitioner)  Suman Jeffrey PA as Referring Physician (Family Medicine)  Emmanuel Fu MD as Cardiologist (Cardiology)  Kandis Snider APRN as Nurse Practitioner (Family Medicine)  Phuc Jones PA-C as Physician Assistant (Physician Assistant)  Mukund Franklin DO as Surgeon (Neurosurgery)  Gayr Davis MD as Surgeon (General Surgery)    Subjective     Esha Galeanowood feeling better.  She stated this symptom has improved last night with no diarrhea and more formed bowel movements.  Her pain also has improved since last night.  She denies nausea or vomiting.  Tolerating clears.       Review of Systems:     Review of Systems - General ROS: negative  Respiratory ROS: no cough, shortness of breath, or wheezing  Cardiovascular ROS: no chest pain or dyspnea on exertion  Gastrointestinal ROS: no abdominal pain, change in bowel habits, or black or bloody stools    Objective     Vital Signs  /52 (BP Location: Left arm, Patient Position: Lying)   Pulse 79   Temp 98.5 °F (36.9 °C) (Oral)   Resp 18   Ht 147.3 cm (58\")   Wt 79.8 kg (176 lb)   SpO2 94%   BMI 36.78 kg/m²     Physical Exam:  Physical Exam  Constitutional:       General: She is not in acute distress.     Appearance: Normal appearance. She is obese. She is not ill-appearing or diaphoretic.   Abdominal:      General: A surgical scar is present. Bowel sounds are normal. There is no distension.      Palpations: Abdomen is soft.      Tenderness: There is no abdominal tenderness. There is no guarding or rebound.   Musculoskeletal:         General: No swelling or tenderness.   Neurological:      Mental Status: She is alert.           Results Review:    Labs reviewed    CBC          8/3/2024    15:47 8/3/2024    22:51 8/4/2024    07:32 8/4/2024    12:23 8/5/2024    09:33   CBC   WBC 22.68   20.11   21.37    RBC 3.87   3.69   3.65    Hemoglobin 11.5  11.0  10.7     10.7  11.0 "  10.6    Hematocrit 34.3  33.4  33.1     33.3  33.2  32.9    MCV 88.6   90.2   90.1    MCH 29.7   29.0   29.0    MCHC 33.5   32.1   32.2    RDW 13.5   13.5   13.4    Platelets 356   308   319         Medication Reviewed:   Current Facility-Administered Medications   Medication Dose Route Frequency Provider Last Rate Last Admin    Calcium Replacement - Follow Nurse / BPA Driven Protocol   Does not apply PRPablo Kelly MD        dexAMETHasone (DECADRON) injection 4 mg  4 mg Intravenous Q8H PRN Pablo Walsh MD        diphenhydrAMINE (BENADRYL) injection 25 mg  25 mg Intravenous Q6H PRN Pablo Walsh MD        famotidine (PEPCID) tablet 40 mg  40 mg Oral Daily Pablo Walsh MD   40 mg at 08/05/24 0846    Magnesium Standard Dose Replacement - Follow Nurse / BPA Driven Protocol   Does not apply Pablo Goldman MD        ondansetron (ZOFRAN) injection 4 mg  4 mg Intravenous Q6H PRN Pablo Walsh MD        Phosphorus Replacement - Follow Nurse / BPA Driven Protocol   Does not apply PRN Pablo Walsh MD        piperacillin-tazobactam (ZOSYN) 3.375 g IVPB in 100 mL NS MBP (CD)  3.375 g Intravenous Q8H Pablo Walsh MD 0 mL/hr at 08/05/24 0030 Currently Infusing at 08/05/24 0642    Potassium Replacement - Follow Nurse / BPA Driven Protocol   Does not apply PRPablo Kelly MD        sodium chloride 0.9 % flush 10 mL  10 mL Intravenous PRN Ning Valadez APRN        sodium chloride 0.9 % flush 10 mL  10 mL Intravenous Q12H Pablo Walsh MD   10 mL at 08/04/24 2036    sodium chloride 0.9 % flush 10 mL  10 mL Intravenous PRN Pablo Walsh MD        sodium chloride 0.9 % infusion 40 mL  40 mL Intravenous PRN Pablo Walsh MD        sodium chloride 0.9 % infusion  100 mL/hr Intravenous Continuous Pablo Walsh  mL/hr at 08/05/24 0608 100 mL/hr at 08/05/24 0608       Assessment & Plan     H&H has remained stable as well as the patient's status.  Will continue  treatment for diverticulitis also due to her left lower quadrant pain which also has improved.  Will advance diet today.  Possible discharge home soon.    Addendum: White count still remains elevated.  Patient does have a history of pleural effusions found on CT as well.  Possible intra-abdominal infection but improving of her symptoms.  Will order a chest x-ray to assess the progression of pulmonary status.  Patient is satting 90+ percent on room air and without evidence of respiratory distress.          Pablo Walsh MD  08/05/24  12:21 CDT

## 2024-08-05 NOTE — CASE MANAGEMENT/SOCIAL WORK
Discharge Planning Assessment  [facility]     Patient Name: Esha Boogie  MRN: [id number]  Today's Date: 8/5/2024    Admit Date: 8/3/2024    Plan: Home   Discharge Needs Assessment       Row Name 08/05/24 1206       Living Environment    People in Home spouse    Name(s) of People in Home spouse- Randal     Current Living Arrangements home    Potentially Unsafe Housing Conditions none    In the past 12 months has the electric, gas, oil, or water company threatened to shut off services in your home? No    Primary Care Provided by self    Provides Primary Care For no one    Family Caregiver if Needed spouse    Quality of Family Relationships supportive;involved;helpful    Able to Return to Prior Arrangements yes       Resource/Environmental Concerns    Resource/Environmental Concerns none       Transportation Needs    In the past 12 months, has lack of transportation kept you from medical appointments or from getting medications? no    In the past 12 months, has lack of transportation kept you from meetings, work, or from getting things needed for daily living? No       Food Insecurity    Within the past 12 months, you worried that your food would run out before you got the money to buy more. Never true    Within the past 12 months, the food you bought just didn't last and you didn't have money to get more. Never true       Transition Planning    Patient/Family Anticipates Transition to home with family    Patient/Family Anticipated Services at Transition none    Transportation Anticipated family or friend will provide       Discharge Needs Assessment    Readmission Within the Last 30 Days no previous admission in last 30 days    Equipment Currently Used at Home cane, straight    Concerns to be Addressed no discharge needs identified;denies needs/concerns at this time    Anticipated Changes Related to Illness none    Equipment Needed After Discharge none                   Discharge Plan       Row Name 08/05/24  1207       Plan    Plan Home    Patient/Family in Agreement with Plan yes    Plan Comments Spoke with pt to assess for d/c planning. Pt lives at home with spouse and plans same. Pt has RX coverage/PCP.  Pt is independent and no home needs identified. Will follow.                  Continued Care and Services - Admitted Since 8/3/2024    No active coordination exists for this encounter.          Demographic Summary    No documentation.                  Functional Status    No documentation.                  Psychosocial    No documentation.                  Abuse/Neglect    No documentation.                  Legal    No documentation.                  Substance Abuse    No documentation.                  Patient Forms    No documentation.                     СВЕТЛАНА JohnsonW

## 2024-08-05 NOTE — PLAN OF CARE
Goal Outcome Evaluation:  Plan of Care Reviewed With: patient      Progress: no change     Pt A&O x4. No c/o pain this shift thus far. IV fluids and abx infusing per order. Up with stand-by assist. BM x1 this shift. VSS. Safety maintained.

## 2024-08-06 ENCOUNTER — TELEPHONE (OUTPATIENT)
Age: 74
End: 2024-08-06
Payer: MEDICARE

## 2024-08-06 ENCOUNTER — TELEPHONE (OUTPATIENT)
Dept: SURGERY | Facility: CLINIC | Age: 74
End: 2024-08-06
Payer: MEDICARE

## 2024-08-06 LAB
ALBUMIN SERPL-MCNC: 3.2 G/DL (ref 3.5–5.2)
ALBUMIN/GLOB SERPL: 1 G/DL
ALP SERPL-CCNC: 75 U/L (ref 39–117)
ALT SERPL W P-5'-P-CCNC: 15 U/L (ref 1–33)
ANION GAP SERPL CALCULATED.3IONS-SCNC: 10 MMOL/L (ref 5–15)
AST SERPL-CCNC: 13 U/L (ref 1–32)
BACTERIA UR QL AUTO: ABNORMAL /HPF
BASOPHILS # BLD MANUAL: 0 10*3/MM3 (ref 0–0.2)
BASOPHILS NFR BLD MANUAL: 0 % (ref 0–1.5)
BILIRUB SERPL-MCNC: 0.2 MG/DL (ref 0–1.2)
BILIRUB UR QL STRIP: NEGATIVE
BUN SERPL-MCNC: 6 MG/DL (ref 8–23)
BUN/CREAT SERPL: 10 (ref 7–25)
BURR CELLS BLD QL SMEAR: ABNORMAL
CALCIUM SPEC-SCNC: 9.1 MG/DL (ref 8.6–10.5)
CHLORIDE SERPL-SCNC: 103 MMOL/L (ref 98–107)
CLARITY UR: ABNORMAL
CO2 SERPL-SCNC: 25 MMOL/L (ref 22–29)
COLOR UR: YELLOW
CREAT SERPL-MCNC: 0.6 MG/DL (ref 0.57–1)
DEPRECATED RDW RBC AUTO: 44 FL (ref 37–54)
EGFRCR SERPLBLD CKD-EPI 2021: 93.7 ML/MIN/1.73
EOSINOPHIL # BLD MANUAL: 0 10*3/MM3 (ref 0–0.4)
EOSINOPHIL NFR BLD MANUAL: 0 % (ref 0.3–6.2)
ERYTHROCYTE [DISTWIDTH] IN BLOOD BY AUTOMATED COUNT: 13.4 % (ref 12.3–15.4)
GLOBULIN UR ELPH-MCNC: 3.2 GM/DL
GLUCOSE SERPL-MCNC: 116 MG/DL (ref 65–99)
GLUCOSE UR STRIP-MCNC: NEGATIVE MG/DL
HCT VFR BLD AUTO: 33.6 % (ref 34–46.6)
HGB BLD-MCNC: 10.9 G/DL (ref 12–15.9)
HGB UR QL STRIP.AUTO: NEGATIVE
HYALINE CASTS UR QL AUTO: ABNORMAL /LPF
KETONES UR QL STRIP: NEGATIVE
LEUKOCYTE ESTERASE UR QL STRIP.AUTO: ABNORMAL
LYMPHOCYTES # BLD MANUAL: 4.36 10*3/MM3 (ref 0.7–3.1)
LYMPHOCYTES NFR BLD MANUAL: 4 % (ref 5–12)
MCH RBC QN AUTO: 28.8 PG (ref 26.6–33)
MCHC RBC AUTO-ENTMCNC: 32.4 G/DL (ref 31.5–35.7)
MCV RBC AUTO: 88.7 FL (ref 79–97)
MONOCYTES # BLD: 0.92 10*3/MM3 (ref 0.1–0.9)
NEUTROPHILS # BLD AUTO: 17.66 10*3/MM3 (ref 1.7–7)
NEUTROPHILS NFR BLD MANUAL: 70 % (ref 42.7–76)
NEUTS BAND NFR BLD MANUAL: 7 % (ref 0–5)
NITRITE UR QL STRIP: NEGATIVE
PH UR STRIP.AUTO: 6.5 [PH] (ref 5–8)
PLAT MORPH BLD: NORMAL
PLATELET # BLD AUTO: 356 10*3/MM3 (ref 140–450)
PMV BLD AUTO: 10.4 FL (ref 6–12)
POIKILOCYTOSIS BLD QL SMEAR: ABNORMAL
POLYCHROMASIA BLD QL SMEAR: ABNORMAL
POTASSIUM SERPL-SCNC: 3.7 MMOL/L (ref 3.5–5.2)
PROT SERPL-MCNC: 6.4 G/DL (ref 6–8.5)
PROT UR QL STRIP: NEGATIVE
RBC # BLD AUTO: 3.79 10*6/MM3 (ref 3.77–5.28)
RBC # UR STRIP: ABNORMAL /HPF
REF LAB TEST METHOD: ABNORMAL
SODIUM SERPL-SCNC: 138 MMOL/L (ref 136–145)
SP GR UR STRIP: 1.01 (ref 1–1.03)
SQUAMOUS #/AREA URNS HPF: ABNORMAL /HPF
UROBILINOGEN UR QL STRIP: ABNORMAL
VARIANT LYMPHS NFR BLD MANUAL: 16 % (ref 19.6–45.3)
VARIANT LYMPHS NFR BLD MANUAL: 3 % (ref 0–5)
WBC # UR STRIP: ABNORMAL /HPF
WBC MORPH BLD: NORMAL
WBC NRBC COR # BLD AUTO: 22.14 10*3/MM3 (ref 3.4–10.8)
YEAST URNS QL MICRO: ABNORMAL /HPF

## 2024-08-06 PROCEDURE — 81001 URINALYSIS AUTO W/SCOPE: CPT | Performed by: INTERNAL MEDICINE

## 2024-08-06 PROCEDURE — 80053 COMPREHEN METABOLIC PANEL: CPT | Performed by: SURGERY

## 2024-08-06 PROCEDURE — 25810000003 SODIUM CHLORIDE 0.9 % SOLUTION: Performed by: SURGERY

## 2024-08-06 PROCEDURE — 85027 COMPLETE CBC AUTOMATED: CPT | Performed by: SURGERY

## 2024-08-06 PROCEDURE — 85007 BL SMEAR W/DIFF WBC COUNT: CPT | Performed by: INTERNAL MEDICINE

## 2024-08-06 RX ORDER — METOPROLOL SUCCINATE 100 MG/1
100 TABLET, EXTENDED RELEASE ORAL
Status: DISCONTINUED | OUTPATIENT
Start: 2024-08-06 | End: 2024-08-07 | Stop reason: HOSPADM

## 2024-08-06 RX ADMIN — METOPROLOL SUCCINATE 100 MG: 100 TABLET, FILM COATED, EXTENDED RELEASE ORAL at 21:54

## 2024-08-06 RX ADMIN — SODIUM CHLORIDE 100 ML/HR: 9 INJECTION, SOLUTION INTRAVENOUS at 14:57

## 2024-08-06 RX ADMIN — Medication 10 ML: at 10:06

## 2024-08-06 RX ADMIN — FAMOTIDINE 40 MG: 20 TABLET, FILM COATED ORAL at 10:06

## 2024-08-06 NOTE — PROGRESS NOTES
Maryuri from  contacted myself about patients worsening pain. States it’s mostly with coughing and stable at rest. She also asked about clarifying some orders such as antibiotic treatment and serial H&H. Spoke with Dr Walsh and contacted Maryuri back and I ordered Ultram PRN for pain. Clarified that antibiotic treatment was D/C’d this AM, further work up for infection pending. Bloodwork ordered for the AM and no serial H&H needed at this time. Continue GI diet and I D/Cd occult blood test per Dr Walsh’s request. Nurse verbalized understanding.

## 2024-08-06 NOTE — TELEPHONE ENCOUNTER
Received a call from the patient stating that she had called Dr. Franklin' office about scheduling her for a stent removal in 3 months and pt stated that Dr. Franklin' office told her that she would have to call Dr. Davis's office and his office would have to call Dr. Franklin' office to get that scheduled. I explained to the pt that I would call over to Dr. Franklin' office to see what they needed from us. When I called Dr. Franklin' office I spoke with Jazmin Franklin' nurse and she stated that she has spoke with pt's  and that if she needed more pain medication she would need to speak with Dr. Davis. Jazmin also stated that we didn't need to schedule this for her. The pt needed to call Dr. Franklin' office to have this scheduled since Dr. Davis did not put in the stent. I called pt back and spoke with pt's  and explained what they needed to do and gave him Dr. Franklin' office number so that they could call and speak with their office. Pt's  verbalized understanding and thanked me for calling back. 08/06/2024 СВЕТЛАНА

## 2024-08-06 NOTE — TELEPHONE ENCOUNTER
Message sent to [facility]      10:15am  New Consult   Esha Boogie 1950   Tor[place]io @ [facility] [phone] called in consult. Dr Pablo Walsh is consulting for leukocytosis. Patient is in room 389.

## 2024-08-06 NOTE — CONSULTS
INFECTIOUS DISEASES CONSULT NOTE    Patient:  Esha Boogie 74 y.o. female  ROOM # 389/1  YOB: 1950  MRN: [id number]  [facility]:  [id number]  Admit date: 8/3/2024   Admitting Physician: Pablo Walsh MD  Primary Care Physician: Charly Dominguez APRN  REFERRING PROVIDER: Pablo Walsh MD    Inpatient Infectious Diseases Consult  Consult performed by: Chichi Groves MD  Consult ordered by: Pablo Walsh MD          REASON FOR CONSULTATION : leukocytosis      HISTORY OF PRESENT ILLNESS: Patient is a pleasant 74-year-old female who had history supplemented by her .  She underwent cholecystectomy July 26 and went to [facility] for ERCP and stent placement per Dr. Sanjeev Franklin.  She was discharged home and said she progressively became weaker.  She denied chest pain, shortness of breath, did have some left lower quadrant to mid abdominal pain and then had a bloody bowel movement.  She said she initially thought she had strained too hard as she had been quite constipated and it was a little harder stool than usual however she did had diarrhea with bloody stool and subsequently presented to the emergency department.    She has not had fevers or chills.  She had some episodic urgency and she said she thought once or twice perhaps she had a urinary tract infection but that has not been overwhelming.    She reports her abdominal pain is improved.  She had a normal bowel movement today.  She feels stronger than she did when she came into the hospital.  She is able to ambulate around the room by herself without difficulty.    She has not had any other ill contacts.  She denies shortness of breath, sore throat, rash, pain at previous IV site.    Past Medical History:   Diagnosis Date    Arthritis     Bleeding tendency     CHF (congestive heart failure)     Diabetes mellitus     GERD (gastroesophageal reflux disease)     Gout     Hearing loss     HEARING AIDS    Hyperlipidemia      Hypertension     Peripheral vascular disease     PONV (postoperative nausea and vomiting)     Sleep apnea     Yeast infection     PRONE TO YEAST     Past Surgical History:   Procedure Laterality Date    BREAST BIOPSY Right     CHOLECYSTECTOMY N/A 7/26/2024    Procedure: LAPAROSCOPIC CHOLECYSTECTOMY INTRAOPERATIVE CHOLANGIOGRAM WITH DAVINCI ROBOT;  Surgeon: Gary Davis MD;  Location: [facility] OR;  Service: Robotics - DaVinci;  Laterality: N/A;    ENDOSCOPY  07/2023    HYSTERECTOMY      Partial    TOTAL HIP ARTHROPLASTY Bilateral     VARICOSE VEIN SURGERY Right 11/14/2022    Procedure: RIGHT SAPHENOUS VEIN RADIO FREQUENCY ABLATION;  Surgeon: Serafin Sher DO;  Location: [facility] HYBRID OR 12;  Service: Vascular;  Laterality: Right;    VARICOSE VEIN SURGERY Left 01/16/2023    Procedure: LEFT SAPHENOUS VEIN RADIO FREQUENCY ABLATION;  Surgeon: Serafin Sher DO;  Location: [facility] HYBRID OR 12;  Service: Vascular;  Laterality: Left;     Family History   Problem Relation Age of Onset    Stroke Mother     Cancer Father     Breast cancer Neg Hx      Social History     Socioeconomic History    Marital status:    Tobacco Use    Smoking status: Never    Smokeless tobacco: Never   Vaping Use    Vaping status: Never Used   Substance and Sexual Activity    Alcohol use: Never    Drug use: Never    Sexual activity: Defer       Current Scheduled Medications:   famotidine, 40 mg, Oral, Daily  sodium chloride, 10 mL, Intravenous, Q12H              Current PRN Medications:    Calcium Replacement - Follow Nurse / BPA Driven Protocol    diphenhydrAMINE    Magnesium Standard Dose Replacement - Follow Nurse / BPA Driven Protocol    ondansetron    Phosphorus Replacement - Follow Nurse / BPA Driven Protocol    Potassium Replacement - Follow Nurse / BPA Driven Protocol    [COMPLETED] Insert Peripheral IV **AND** sodium chloride    sodium chloride    sodium chloride    traMADol      sodium chloride, 100 mL/hr, Last Rate: 100  "mL/hr (24 1457)             Allergies   Allergen Reactions    Entresto [Sacubitril-Valsartan] Itching and Rash     YEAST    Farxiga [Dapagliflozin] Itching     Itching and yeast infection     Jardiance [Empagliflozin] Itching    Latex Rash    Silicone Rash           Vital Signs:  /42 (BP Location: Right arm, Patient Position: Lying)   Pulse 77   Temp 98 °F (36.7 °C)   Resp 18   Ht 147.3 cm (58\")   Wt 79.8 kg (176 lb)   SpO2 100%   BMI 36.78 kg/m²   Temp (24hrs), Av.9 °F (36.6 °C), Min:97.2 °F (36.2 °C), Max:98.4 °F (36.9 °C)      Physical Exam    General: Patient is an obese female sitting up in recliner in no acute distress  Lungs: Clear bilaterally  Heart: S1-S2 rate is regular  Abdomen: Incision sites appear clean dry and intact.  Patient has soft abdomen without any rebound or guarding.  IV site without phlebitis  No obvious joint effusions increased warmth or erythema    Results Review:    I reviewed the patient's new clinical results.    Lab Results:    CBC:   Lab Results   Lab 24  1547 24  2251 24  0732 24  1223 24  0933 24  0512   WBC 22.68*  --  20.11*  --  21.37* 22.14*   HEMOGLOBIN 11.5*   < > 10.7*  10.7* 11.0* 10.6* 10.9*   HEMATOCRIT 34.3   < > 33.3*  33.1* 33.2* 32.9* 33.6*   PLATELETS 356  --  308  --  319 356    < > = values in this interval not displayed.        AutoDiff:   Lab Results   Lab 24  1547 24  0732 24  0512   NEUTROPHIL %  --  76.8*  --    LYMPHOCYTE %  --  12.4*  --    MONOCYTES %  --  6.7  --    EOSINOPHIL %  --  0.8  --    BASOPHIL %  --  0.4  --    NEUTROS ABS 19.57* 15.44* 17.66*   LYMPHS ABS  --  2.50  --    MONOS ABS  --  1.34*  --    EOS ABS 0.23 0.17 0.00   BASOS ABS 0.00 0.08 0.00        Manual Diff:    Lab Results   Lab 24  1547 24  0732 24  0512   NEUTROPHIL % 83.3*  --  70.0   LYMPHOCYTE % 6.9*  --  16.0*   MONOCYTES % 2.0*  --  4.0*   BANDS % 2.9  --  7.0*   METAMYELOCYTE % 2.0* "  --   --    NEUTROS ABS 19.57* 15.44* 17.66*   LYMPHS ABS 1.56  --  4.36*   POIKILOCYTES  --   --  Slight/1+   WBC MORPHOLOGY  --   --  Normal   PLT MORPH Normal  --  Normal        CMP:   Lab Results   Lab 08/04/24  0732 08/05/24  0424 08/05/24  0933 08/06/24 0512   SODIUM 137  137 137 136 138   POTASSIUM 4.2  4.2 3.7 4.0 3.7   CHLORIDE 101  101 100 100 103   CO2 25.0  27.0 25.0 21.0* 25.0   BUN 18  18 8 7* 6*   CREATININE 0.71  0.71 0.61 0.69 0.60   CALCIUM 9.4  9.4 9.5 9.1 9.1   BILIRUBIN 0.4  --  0.3 0.2   ALK PHOS 67  --  71 75   ALT (SGPT) 17  --  16 15   AST (SGOT) 12  --  15 13   GLUCOSE 124*  124* 125* 212* 116*       Lab Results (last 72 hours)       Procedure Component Value Units Date/Time    Comprehensive Metabolic Panel [[id number]]  (Abnormal) Collected: 08/06/24 0512    Specimen: Blood Updated: 08/06/24 0602     Glucose 116 mg/dL      BUN 6 mg/dL      Creatinine 0.60 mg/dL      Sodium 138 mmol/L      Potassium 3.7 mmol/L      Chloride 103 mmol/L      CO2 25.0 mmol/L      Calcium 9.1 mg/dL      Total Protein 6.4 g/dL      Albumin 3.2 g/dL      ALT (SGPT) 15 U/L      AST (SGOT) 13 U/L      Alkaline Phosphatase 75 U/L      Total Bilirubin 0.2 mg/dL      Globulin 3.2 gm/dL      A/G Ratio 1.0 g/dL      BUN/Creatinine Ratio 10.0     Anion Gap 10.0 mmol/L      eGFR 93.7 mL/min/1.73     Narrative:      GFR Normal >60  Chronic Kidney Disease <60  Kidney Failure <15    The GFR formula is only valid for adults with stable renal function between ages 18 and 70.    CBC (No Diff) [[id number]]  (Abnormal) Collected: 08/06/24 0512    Specimen: Blood Updated: 08/06/24 0537     WBC 22.14 10*3/mm3      RBC 3.79 10*6/mm3      Hemoglobin 10.9 g/dL      Hematocrit 33.6 %      MCV 88.7 fL      MCH 28.8 pg      MCHC 32.4 g/dL      RDW 13.4 %      RDW-SD 44.0 fl      MPV 10.4 fL      Platelets 356 10*3/mm3     Comprehensive Metabolic Panel [[id number]]  (Abnormal) Collected: 08/05/24 0943    Specimen: Blood Updated:  08/05/24 1102     Glucose 212 mg/dL      BUN 7 mg/dL      Creatinine 0.69 mg/dL      Sodium 136 mmol/L      Potassium 4.0 mmol/L      Comment: Slight hemolysis detected by analyzer. Result may be falsely elevated.        Chloride 100 mmol/L      CO2 21.0 mmol/L      Calcium 9.1 mg/dL      Total Protein 6.1 g/dL      Albumin 3.0 g/dL      ALT (SGPT) 16 U/L      AST (SGOT) 15 U/L      Comment: Slight hemolysis detected by analyzer. Result may be falsely elevated.        Alkaline Phosphatase 71 U/L      Total Bilirubin 0.3 mg/dL      Globulin 3.1 gm/dL      A/G Ratio 1.0 g/dL      BUN/Creatinine Ratio 10.1     Anion Gap 15.0 mmol/L      eGFR 90.6 mL/min/1.73     Narrative:      GFR Normal >60  Chronic Kidney Disease <60  Kidney Failure <15    The GFR formula is only valid for adults with stable renal function between ages 18 and 70.    CBC (No Diff) [[id number]]  (Abnormal) Collected: 08/05/24 0933    Specimen: Blood Updated: 08/05/24 1028     WBC 21.37 10*3/mm3      RBC 3.65 10*6/mm3      Hemoglobin 10.6 g/dL      Hematocrit 32.9 %      MCV 90.1 fL      MCH 29.0 pg      MCHC 32.2 g/dL      RDW 13.4 %      RDW-SD 44.7 fl      MPV 11.2 fL      Platelets 319 10*3/mm3     Basic Metabolic Panel [[id number]]  (Abnormal) Collected: 08/05/24 0424    Specimen: Blood Updated: 08/05/24 0553     Glucose 125 mg/dL      BUN 8 mg/dL      Creatinine 0.61 mg/dL      Sodium 137 mmol/L      Potassium 3.7 mmol/L      Chloride 100 mmol/L      CO2 25.0 mmol/L      Calcium 9.5 mg/dL      BUN/Creatinine Ratio 13.1     Anion Gap 12.0 mmol/L      eGFR 93.4 mL/min/1.73     Narrative:      GFR Normal >60  Chronic Kidney Disease <60  Kidney Failure <15    The GFR formula is only valid for adults with stable renal function between ages 18 and 70.    Hemoglobin & Hematocrit, Blood [[id number]]  (Abnormal) Collected: 08/04/24 1223    Specimen: Blood Updated: 08/04/24 1324     Hemoglobin 11.0 g/dL      Hematocrit 33.2 %     Comprehensive Metabolic Panel  [[id number]]  (Abnormal) Collected: 08/04/24 0732    Specimen: Blood Updated: 08/04/24 0847     Glucose 124 mg/dL      BUN 18 mg/dL      Creatinine 0.71 mg/dL      Sodium 137 mmol/L      Potassium 4.2 mmol/L      Chloride 101 mmol/L      CO2 25.0 mmol/L      Calcium 9.4 mg/dL      Total Protein 6.0 g/dL      Albumin 3.1 g/dL      ALT (SGPT) 17 U/L      AST (SGOT) 12 U/L      Alkaline Phosphatase 67 U/L      Total Bilirubin 0.4 mg/dL      Globulin 2.9 gm/dL      A/G Ratio 1.1 g/dL      BUN/Creatinine Ratio 25.4     Anion Gap 11.0 mmol/L      eGFR 88.8 mL/min/1.73     Narrative:      GFR Normal >60  Chronic Kidney Disease <60  Kidney Failure <15    The GFR formula is only valid for adults with stable renal function between ages 18 and 70.    Basic Metabolic Panel [[id number]]  (Abnormal) Collected: 08/04/24 0732    Specimen: Blood Updated: 08/04/24 0827     Glucose 124 mg/dL      BUN 18 mg/dL      Creatinine 0.71 mg/dL      Sodium 137 mmol/L      Potassium 4.2 mmol/L      Chloride 101 mmol/L      CO2 27.0 mmol/L      Calcium 9.4 mg/dL      BUN/Creatinine Ratio 25.4     Anion Gap 9.0 mmol/L      eGFR 88.8 mL/min/1.73     Narrative:      GFR Normal >60  Chronic Kidney Disease <60  Kidney Failure <15    The GFR formula is only valid for adults with stable renal function between ages 18 and 70.    CBC & Differential [[id number]]  (Abnormal) Collected: 08/04/24 0732    Specimen: Blood Updated: 08/04/24 0818    Narrative:      The following orders were created for panel order CBC & Differential.  Procedure                               Abnormality         Status                     ---------                               -----------         ------                     CBC Auto Differential[[id number]]        Abnormal            Final result                 Please view results for these tests on the individual orders.    CBC Auto Differential [[id number]]  (Abnormal) Collected: 08/04/24 0732    Specimen: Blood Updated: 08/04/24  0818     WBC 20.11 10*3/mm3      RBC 3.69 10*6/mm3      Hemoglobin 10.7 g/dL      Hematocrit 33.3 %      MCV 90.2 fL      MCH 29.0 pg      MCHC 32.1 g/dL      RDW 13.5 %      RDW-SD 44.9 fl      MPV 10.5 fL      Platelets 308 10*3/mm3      Neutrophil % 76.8 %      Lymphocyte % 12.4 %      Monocyte % 6.7 %      Eosinophil % 0.8 %      Basophil % 0.4 %      Immature Grans % 2.9 %      Neutrophils, Absolute 15.44 10*3/mm3      Lymphocytes, Absolute 2.50 10*3/mm3      Monocytes, Absolute 1.34 10*3/mm3      Eosinophils, Absolute 0.17 10*3/mm3      Basophils, Absolute 0.08 10*3/mm3      Immature Grans, Absolute 0.58 10*3/mm3      nRBC 0.0 /100 WBC     Hemoglobin & Hematocrit, Blood [[id number]]  (Abnormal) Collected: 08/04/24 0732    Specimen: Blood Updated: 08/04/24 0759     Hemoglobin 10.7 g/dL      Hematocrit 33.1 %     Hemoglobin & Hematocrit, Blood [[id number]]  (Abnormal) Collected: 08/03/24 2251    Specimen: Blood Updated: 08/03/24 2321     Hemoglobin 11.0 g/dL      Hematocrit 33.4 %     Basic Metabolic Panel [[id number]]  (Abnormal) Collected: 08/03/24 1948    Specimen: Blood Updated: 08/03/24 2025     Glucose 117 mg/dL      BUN 30 mg/dL      Creatinine 0.87 mg/dL      Sodium 136 mmol/L      Potassium 4.1 mmol/L      Chloride 99 mmol/L      CO2 26.0 mmol/L      Calcium 9.6 mg/dL      BUN/Creatinine Ratio 34.5     Anion Gap 11.0 mmol/L      eGFR 69.6 mL/min/1.73     Narrative:      GFR Normal >60  Chronic Kidney Disease <60  Kidney Failure <15    The GFR formula is only valid for adults with stable renal function between ages 18 and 70.    Urinalysis With Culture If Indicated - Urine, Clean Catch [[id number]]  (Normal) Collected: 08/03/24 1753    Specimen: Urine, Clean Catch Updated: 08/03/24 1825     Color, UA Yellow     Appearance, UA Clear     pH, UA 5.5     Specific Gravity, UA >=1.030     Glucose, UA Negative     Ketones, UA Negative     Bilirubin, UA Negative     Blood, UA Negative     Protein, UA Negative      Leuk Esterase, UA Negative     Nitrite, UA Negative     Urobilinogen, UA 0.2 E.U./dL    Narrative:      In absence of clinical symptoms, the presence of pyuria, bacteria, and/or nitrites on the urinalysis result does not correlate with infection.  Urine microscopic not indicated.    CBC & Differential [[id number]]  (Abnormal) Collected: 08/03/24 1547    Specimen: Blood Updated: 08/03/24 1625    Narrative:      The following orders were created for panel order CBC & Differential.  Procedure                               Abnormality         Status                     ---------                               -----------         ------                     CBC Auto Differential[[id number]]        Abnormal            Final result                 Please view results for these tests on the individual orders.    CBC Auto Differential [[id number]]  (Abnormal) Collected: 08/03/24 1547    Specimen: Blood Updated: 08/03/24 1625     WBC 22.68 10*3/mm3      RBC 3.87 10*6/mm3      Hemoglobin 11.5 g/dL      Hematocrit 34.3 %      MCV 88.6 fL      MCH 29.7 pg      MCHC 33.5 g/dL      RDW 13.5 %      RDW-SD 43.7 fl      MPV 10.5 fL      Platelets 356 10*3/mm3     Narrative:      The previously reported component NRBC is no longer being reported. Previous result was 0.0 /100 WBC (Reference Range: 0.0-0.2 /100 WBC) on 8/3/2024 at 1559 CDT.    Manual Differential [[id number]]  (Abnormal) Collected: 08/03/24 1547    Specimen: Blood Updated: 08/03/24 1625     Neutrophil % 83.3 %      Lymphocyte % 6.9 %      Monocyte % 2.0 %      Eosinophil % 1.0 %      Basophil % 0.0 %      Bands %  2.9 %      Metamyelocyte % 2.0 %      Myelocyte % 2.0 %      Neutrophils Absolute 19.57 10*3/mm3      Lymphocytes Absolute 1.56 10*3/mm3      Monocytes Absolute 0.45 10*3/mm3      Eosinophils Absolute 0.23 10*3/mm3      Basophils Absolute 0.00 10*3/mm3      Polychromasia Slight/1+     Vacuolated Neutrophils Slight/1+     Platelet Morphology Normal    Comprehensive  Metabolic Panel [[id number]]  (Abnormal) Collected: 08/03/24 1547    Specimen: Blood Updated: 08/03/24 1622     Glucose 148 mg/dL      BUN 35 mg/dL      Creatinine 1.02 mg/dL      Sodium 130 mmol/L      Potassium 4.4 mmol/L      Comment: Slight hemolysis detected by analyzer. Result may be falsely elevated.        Chloride 96 mmol/L      CO2 23.0 mmol/L      Calcium 9.9 mg/dL      Total Protein 6.7 g/dL      Albumin 3.4 g/dL      ALT (SGPT) 24 U/L      AST (SGOT) 16 U/L      Comment: Slight hemolysis detected by analyzer. Result may be falsely elevated.        Alkaline Phosphatase 78 U/L      Total Bilirubin 0.2 mg/dL      Globulin 3.3 gm/dL      A/G Ratio 1.0 g/dL      BUN/Creatinine Ratio 34.3     Anion Gap 11.0 mmol/L      eGFR 57.5 mL/min/1.73     Narrative:      GFR Normal >60  Chronic Kidney Disease <60  Kidney Failure <15    The GFR formula is only valid for adults with stable renal function between ages 18 and 70.    Magnesium [[id number]]  (Normal) Collected: 08/03/24 1547    Specimen: Blood Updated: 08/03/24 1622     Magnesium 1.6 mg/dL     Lactic Acid, Plasma [[id number]]  (Normal) Collected: 08/03/24 1547    Specimen: Blood Updated: 08/03/24 1611     Lactate 1.4 mmol/L     Lipase [[id number]]  (Normal) Collected: 08/03/24 1547    Specimen: Blood Updated: 08/03/24 1610     Lipase 30 U/L     Protime-INR [[id number]]  (Normal) Collected: 08/03/24 1547    Specimen: Blood Updated: 08/03/24 1607     Protime 13.6 Seconds      INR 1.00    aPTT [[id number]]  (Normal) Collected: 08/03/24 1547    Specimen: Blood Updated: 08/03/24 1607     PTT 27.7 seconds             Estimated Creatinine Clearance: 77 mL/min (by C-G formula based on SCr of 0.6 mg/dL).    Culture Results:    Microbiology Results (last 10 days)       ** No results found for the last 240 hours. **               Radiology:           Imaging Results (Last 72 Hours)       Procedure Component Value Units Date/Time    XR Chest PA & Lateral [[id number]] Collected:  08/05/24 1257     Updated: 08/05/24 1301    Narrative:      EXAMINATION: XR CHEST PA AND LATERAL-  8/5/2024 12:57 PM     HISTORY: Pleural effusions.     FINDINGS: 2 view exam of the chest demonstrates minimal effusions with  blunting of the lateral and posterior costophrenic angles. The lungs are  otherwise clear. No consolidative pneumonia. No free air beneath the  diaphragms.       Impression:      1.. Minimal bilateral pleural effusions with blunting of the lateral and  posterior costophrenic angles. The lungs are otherwise clear.     This report was signed and finalized on 8/5/2024 12:58 PM by Dr. Rajinder Rodgers MD.       CT Abdomen Pelvis With Contrast [[id number]] Collected: 08/03/24 1713     Updated: 08/03/24 1721    Narrative:      EXAM: CT ABDOMEN PELVIS W CONTRAST-      DATE: 8/3/2024 3:10 PM     HISTORY: abdominal pain, recent post op gallbladder and ERCP, now bloody  stool       COMPARISON: None available.     DOSE LENGTH PRODUCT: 437.61 mGy.cm Automatic exposure control was  utilized to make radiation dose as low as reasonably achievable.     TECHNIQUE: Contrast enhanced axial images of the abdomen and pelvis  obtained with multiplanar reformats.     FINDINGS:  VISUALIZED CHEST: No pericardial effusion is seen. There are trace  bilateral pleural effusions and atelectasis.     LIVER/BILE DUCTS: The patient is status post cholecystectomy. There is a  common bile duct stent. Mild focal fatty infiltration of the liver is  noted along the falciform ligament. Fluid and stranding at the  gallbladder fossa is likely postoperative. No localized fluid collection  is seen. Portal vein branches are patent.     KIDNEYS/URETERS: The bilateral kidneys and visualized ureters are  unremarkable. There is no hydronephrosis.     ADRENAL: Unremarkable.     SPLEEN: Unremarkable.     PANCREAS: Unremarkable.     MESENTERY: Again there are postoperative changes of the mesentery at the  right upper quadrant. No lymphadenopathy  or free fluid is seen.     VASCULATURE: There is atherosclerosis of the abdominal aorta without  aneurysm formation. Vasculature is intact without stenosis or occlusion.     GI TRACT: There is no bowel obstruction. The abdominal portion of the GI  tract is unremarkable.     PELVIS: There is diverticulosis of the sigmoid colon without definite  acute diverticulitis. Extensive metallic streak artifact from bilateral  total hip arthroplasty somewhat obscures visualization of the pelvis. No  lymphadenopathy or free fluid is seen.     SOFT TISSUES: There are postoperative changes of the subcutaneous soft  tissues anteriorly.     BONES: No acute or aggressive bony lesion.           Impression:      1. Postoperative change of recent cholecystectomy. There is also a  common bile duct stent.  2. Sigmoid colon diverticulosis without visualized diverticulitis.  3. Visualization of the pelvis somewhat obscured due to metallic streak  artifact from bilateral total hip arthroplasty.  4. Small bilateral pleural effusions with atelectasis.        This report was signed and finalized on 8/3/2024 5:17 PM by Amauri Bolanos.                 HOSPITAL PROBLEM LIST:      Diverticulosis large intestine w/o perforation or abscess w/bleeding      IMPRESSION:  Leukocytosis-fairly stable since admission.  White count had been 20,000 postoperatively then down to 14,000 on July 28.  Currently she does describe an episode of progressive weakness at home marked by worsening abdominal pain and bloody bowel movements after period of several days of constipation.  Clinically patient feels well, still has abdominal pain but it is improved, reports her strength has improved and she is tolerating p.o.  Additionally she has what she considers a normal bowel movement today.  No steroid dosing to explain elevated white count.  She has minimal complaints of urgency and admission UA was unremarkable but will repeat clean-catch UA with reflex to  culture        RECOMMENDATION:   Add manual diff to cbc-done  UA clean catch with reflex - d/w patient and nursing re: proper collection  Hold further abx repeat CBC with differential in AM  Monitor hemodynamics and history closely.  If patient remains clinically improved, could consider discharge soon with close outpatient follow-up and possible repeat CT scan of abdomen and pelvis if abdominal pain persist       Discussed with Dr. Walsh earlier  Chichi Groves MD  08/06/24  17:07 CDT

## 2024-08-06 NOTE — PLAN OF CARE
Goal Outcome Evaluation:  Plan of Care Reviewed With: patient        Progress: no change       Pt with minimal complaints of pain so far during shift; see MAR. IVF infusing per order. Up ad ny. Voiding. Tolerating GI soft diet. SCDs for VTE prevention. Lap sites with steri strips. VSS. Safety maintained.

## 2024-08-06 NOTE — PROGRESS NOTES
"Patient Care Team:  Charly Dominguez APRN as PCP - General (Nurse Practitioner)  Suman Jeffrey PA as Referring Physician (Family Medicine)  Emmanuel Fu MD as Cardiologist (Cardiology)  Kandis Snider APRN as Nurse Practitioner (Family Medicine)  Phuc Jnoes PA-C as Physician Assistant (Physician Assistant)  Mukund Franklin DO as Surgeon (Neurosurgery)  Gary Davis MD as Surgeon (General Surgery)    Subjective     Esha Galeanowood feeling better.  She stated had an uneventful last night with no diarrhea and more formed bowel movements.  She denies nausea or vomiting.   Tolerating p.o continues to have normal bowel movements now more formed no blood noted.       Review of Systems:     Review of Systems - General ROS: negative  Respiratory ROS: no cough, shortness of breath, or wheezing  Cardiovascular ROS: no chest pain or dyspnea on exertion  Gastrointestinal ROS: no abdominal pain, change in bowel habits, or black or bloody stools    Objective     Vital Signs  /50 (BP Location: Right arm, Patient Position: Lying)   Pulse 74   Temp 98.3 °F (36.8 °C)   Resp 18   Ht 147.3 cm (58\")   Wt 79.8 kg (176 lb)   SpO2 96%   BMI 36.78 kg/m²     Physical Exam:  Physical Exam  Constitutional:       General: She is not in acute distress.     Appearance: Normal appearance. She is obese. She is not ill-appearing or diaphoretic.   Abdominal:      General: A surgical scar is present. Bowel sounds are normal. There is no distension.      Palpations: Abdomen is soft.      Tenderness: There is no abdominal tenderness. There is no guarding or rebound.   Musculoskeletal:         General: No swelling or tenderness.   Neurological:      Mental Status: She is alert.           Results Review:    Labs reviewed    CBC          8/4/2024    07:32 8/4/2024    12:23 8/5/2024    09:33 8/6/2024    05:12   CBC   WBC 20.11   21.37  22.14    RBC 3.69   3.65  3.79    Hemoglobin 10.7     10.7  11.0  10.6  10.9  "   Hematocrit 33.1     33.3  33.2  32.9  33.6    MCV 90.2   90.1  88.7    MCH 29.0   29.0  28.8    MCHC 32.1   32.2  32.4    RDW 13.5   13.4  13.4    Platelets 308   319  356         Medication Reviewed:   Current Facility-Administered Medications   Medication Dose Route Frequency Provider Last Rate Last Admin    Calcium Replacement - Follow Nurse / BPA Driven Protocol   Does not apply PRPablo Kelly MD        diphenhydrAMINE (BENADRYL) injection 25 mg  25 mg Intravenous Q6H PRN Pablo Walsh MD        famotidine (PEPCID) tablet 40 mg  40 mg Oral Daily Pablo Walsh MD   40 mg at 08/06/24 1006    Magnesium Standard Dose Replacement - Follow Nurse / BPA Driven Protocol   Does not apply Pablo Goldman MD        ondansetron (ZOFRAN) injection 4 mg  4 mg Intravenous Q6H PRN Pablo Walsh MD        Phosphorus Replacement - Follow Nurse / BPA Driven Protocol   Does not apply Pablo Goldman MD        Potassium Replacement - Follow Nurse / BPA Driven Protocol   Does not apply PRN Pablo Walsh MD        sodium chloride 0.9 % flush 10 mL  10 mL Intravenous PRN Ning Valadez APRN        sodium chloride 0.9 % flush 10 mL  10 mL Intravenous Q12H Pablo Walsh MD   10 mL at 08/06/24 1006    sodium chloride 0.9 % flush 10 mL  10 mL Intravenous PRN Pablo Walsh MD        sodium chloride 0.9 % infusion 40 mL  40 mL Intravenous PRN Pablo Walsh MD        sodium chloride 0.9 % infusion  100 mL/hr Intravenous Continuous Pablo Walsh  mL/hr at 08/05/24 0608 100 mL/hr at 08/05/24 0608    traMADol (ULTRAM) tablet 50 mg  50 mg Oral Q6H PRN Sonam Shaw APRN   50 mg at 08/05/24 2229       Assessment & Plan     H&H has remained stable.    White count still remains elevated.  Patient does have a history of pleural effusions and atelectasis on x-ray.  Possible intra-abdominal infection likely due to her tolerating p.o. and having bowel movements with decreased pain symptoms.     Patient was encouraged to utilize her incentive spirometer.  I discontinued the Zosyn yesterday.  Will consult infectious disease today.  Plan was discussed with patient and .        Pablo Walsh MD  08/06/24  10:23 CDT

## 2024-08-06 NOTE — DISCHARGE SUMMARY
General Surgery Discharge Summary    Patient Name:  Esha Boogie  YOB: 1950  [id number]      DATE OF ADMISSION: 7/26/2024    DATE OF DISCHARGE: 8/6/2024     FINAL DIAGNOSES:   Patient Active Problem List   Diagnosis    Essential hypertension    WILLSON (dyspnea on exertion)    Mixed hyperlipidemia    Venous insufficiency    COVID-19 vaccine series completed    KATY treated with BiPAP    Class 2 severe obesity due to excess calories with serious comorbidity and body mass index (BMI) of 37.0 to 37.9 in adult    Chronic diastolic congestive heart failure    Chronic venous hypertension with inflammation involving both sides    Preop testing    DDD (degenerative disc disease), cervical    Impingement of right shoulder    Nonsmoker    Class 2 obesity due to excess calories without serious comorbidity with body mass index (BMI) of 38.0 to 38.9 in adult    Symptomatic cholelithiasis    Choledocholithiasis    Leukocytosis    Diverticulosis large intestine w/o perforation or abscess w/bleeding       CONSULTING SERVICES: GI     PROCEDURES PERFORMED:   1.Robotic cholecystectomy with intraoperative cholangiogram    HISTORY: The patient is a very pleasant 74 y.o. female with a history of symptomatic cholelithiasis.  She underwent an uneventful cholecystectomy but on cholangiogram was found to have numerous filling defects concerning for choledocholithiasis.     HOSPITAL COURSE: Patient was admitted postoperatively for supportive care.  She underwent an MRCP that confirmed choledocholithiasis.  She was transferred to [facility] to undergo ERCP with Dr. Franklin which she tolerated well.     CONDITION: At the time of discharge, the patient is tolerating a regular diet without difficulty. she is having normal bowel and bladder function. her incisions are clean, dry and intact.  She was in stable condition     DISCHARGE MEDICATIONS:   1. All previous home medications.   2.  Oxycodone, Flexeril     DISCHARGE  INSTRUCTIONS:  The patient has been instructed to follow up with Dr. Davis  in 2 weeks.    Okay to shower 48 hours after surgery.  No soaking under water for 2 weeks.  May perform light activities after surgery.  Avoid lifting more than 20 pounds or excessive straining for 4 weeks after surgery.  Alternate ibuprofen and tylenol scheduled around-the-clock. Take prescription pain medication exactly as directed.  Take a stool softener while on prescription pain medication.  Okay to drive once off of pain medication. Continue on a Regular diet.  The patient may return to work in 2 weeks on light duty or 4 weeks on full duty.        Gary Davis MD  8/6/2024  12:42 CDT      Please note that portions of this note were completed with a voice recognition program.

## 2024-08-06 NOTE — PLAN OF CARE
Goal Outcome Evaluation:              Outcome Evaluation: A&O. Up ad ny. IVF. Room air. No c/o pain or nausea. Voiding. BM x2. SCDs. Meds whole with applesauce. ID consult. UA obtained per clean catch method via patient without assistance. Safety maintained.

## 2024-08-07 ENCOUNTER — READMISSION MANAGEMENT (OUTPATIENT)
Dept: CALL CENTER | Facility: HOSPITAL | Age: 74
End: 2024-08-07
Payer: MEDICARE

## 2024-08-07 VITALS
TEMPERATURE: 98.7 F | HEIGHT: 58 IN | DIASTOLIC BLOOD PRESSURE: 53 MMHG | SYSTOLIC BLOOD PRESSURE: 122 MMHG | RESPIRATION RATE: 16 BRPM | BODY MASS INDEX: 36.94 KG/M2 | OXYGEN SATURATION: 96 % | WEIGHT: 176 LBS | HEART RATE: 75 BPM

## 2024-08-07 LAB
BASOPHILS # BLD AUTO: 0.08 10*3/MM3 (ref 0–0.2)
BASOPHILS NFR BLD AUTO: 0.5 % (ref 0–1.5)
DEPRECATED RDW RBC AUTO: 44.3 FL (ref 37–54)
EOSINOPHIL # BLD AUTO: 0.15 10*3/MM3 (ref 0–0.4)
EOSINOPHIL NFR BLD AUTO: 0.9 % (ref 0.3–6.2)
ERYTHROCYTE [DISTWIDTH] IN BLOOD BY AUTOMATED COUNT: 13.3 % (ref 12.3–15.4)
HCT VFR BLD AUTO: 33 % (ref 34–46.6)
HGB BLD-MCNC: 10.7 G/DL (ref 12–15.9)
IMM GRANULOCYTES # BLD AUTO: 0.6 10*3/MM3 (ref 0–0.05)
IMM GRANULOCYTES NFR BLD AUTO: 3.5 % (ref 0–0.5)
LYMPHOCYTES # BLD AUTO: 2.26 10*3/MM3 (ref 0.7–3.1)
LYMPHOCYTES NFR BLD AUTO: 13.3 % (ref 19.6–45.3)
MCH RBC QN AUTO: 29.3 PG (ref 26.6–33)
MCHC RBC AUTO-ENTMCNC: 32.4 G/DL (ref 31.5–35.7)
MCV RBC AUTO: 90.4 FL (ref 79–97)
MONOCYTES # BLD AUTO: 0.91 10*3/MM3 (ref 0.1–0.9)
MONOCYTES NFR BLD AUTO: 5.3 % (ref 5–12)
NEUTROPHILS NFR BLD AUTO: 13.02 10*3/MM3 (ref 1.7–7)
NEUTROPHILS NFR BLD AUTO: 76.5 % (ref 42.7–76)
NRBC BLD AUTO-RTO: 0 /100 WBC (ref 0–0.2)
PLATELET # BLD AUTO: 346 10*3/MM3 (ref 140–450)
PMV BLD AUTO: 10.7 FL (ref 6–12)
RBC # BLD AUTO: 3.65 10*6/MM3 (ref 3.77–5.28)
WBC NRBC COR # BLD AUTO: 17.02 10*3/MM3 (ref 3.4–10.8)

## 2024-08-07 PROCEDURE — 25810000003 SODIUM CHLORIDE 0.9 % SOLUTION: Performed by: SURGERY

## 2024-08-07 PROCEDURE — 85025 COMPLETE CBC W/AUTO DIFF WBC: CPT | Performed by: SURGERY

## 2024-08-07 RX ORDER — TRAMADOL HYDROCHLORIDE 50 MG/1
50 TABLET ORAL EVERY 8 HOURS PRN
Qty: 30 TABLET | Refills: 0 | Status: SHIPPED | OUTPATIENT
Start: 2024-08-07 | End: 2024-08-13

## 2024-08-07 RX ADMIN — SODIUM CHLORIDE 100 ML/HR: 9 INJECTION, SOLUTION INTRAVENOUS at 00:03

## 2024-08-07 RX ADMIN — FAMOTIDINE 40 MG: 20 TABLET, FILM COATED ORAL at 09:10

## 2024-08-07 NOTE — PLAN OF CARE
Goal Outcome Evaluation:              Outcome Evaluation: No changes overnight. Resting well. No c/o pain. Metoprolol restarted per patient request, . Safety maintained.

## 2024-08-07 NOTE — PAYOR COMM NOTE
"Esha Quintana (74 y.o. Female)  [id number]   Additional clinical   [facility] phone     fax          Date of Birth   1950    Social Security Number       Address   [address] [address]    Home Phone   [phone]    MRN   [id number]       Sabianism   Voodoo    Marital Status                               Admission Date   8/3/24    Admission Type   Emergency    Admitting Provider   Pablo Walsh MD    Attending Provider   Pablo Walsh MD    Department, Room/Bed   [facility] 3C, 389/1       Discharge Date       Discharge Disposition       Discharge Destination                                 Attending Provider: Pablo Walsh MD    Allergies: Entresto [Sacubitril-valsartan], Farxiga [Dapagliflozin], Jardiance [Empagliflozin], Latex, Silicone    Isolation: None   Infection: None   Code Status: CPR    Ht: 147.3 cm (58\")   Wt: 79.8 kg (176 lb)    Admission Cmt: None   Principal Problem: Diverticulosis large intestine w/o perforation or abscess w/bleeding [K57.31]                   Active Insurance as of 8/3/2024       Primary Coverage       Payor Plan Insurance Group Employer/Plan Group    AETNA MEDICARE REPLACEMENT AETNA MEDICARE REPLACEMENT [id number]       Payor Plan Address Payor Plan Phone Number Payor Plan Fax Number Effective Dates    [address] [phone]  1/1/2023 - None Entered    [facility] [id number]         Subscriber Name Subscriber Birth Date Member ID       ESHA QUINTANA 1950 [id number]                     Emergency Contacts        (Rel.) Home Phone Work Phone Mobile Phone    Randal Quintana (Spouse) [phone] -- [phone]              Current Facility-Administered Medications   Medication Dose Route Frequency Provider Last Rate Last Admin    Calcium Replacement - Follow Nurse / BPA Driven Protocol   Does not apply PRN Pablo Walsh MD        " diphenhydrAMINE (BENADRYL) injection 25 mg  25 mg Intravenous Q6H PRN Pablo Walsh MD        famotidine (PEPCID) tablet 40 mg  40 mg Oral Daily Pablo Walsh MD   40 mg at 08/06/24 1006    Magnesium Standard Dose Replacement - Follow Nurse / BPA Driven Protocol   Does not apply PRN Pablo Walsh MD        metoprolol succinate XL (TOPROL-XL) 24 hr tablet 100 mg  100 mg Oral Q24H Evie Bolanos MD   100 mg at 08/06/24 2154    ondansetron (ZOFRAN) injection 4 mg  4 mg Intravenous Q6H PRN Pablo Walsh MD        Phosphorus Replacement - Follow Nurse / BPA Driven Protocol   Does not apply PRN Pablo Walsh MD        Potassium Replacement - Follow Nurse / BPA Driven Protocol   Does not apply PRN Pablo Walsh MD        sodium chloride 0.9 % flush 10 mL  10 mL Intravenous PRN Ning Valadez APRN        sodium chloride 0.9 % flush 10 mL  10 mL Intravenous Q12H Pablo Walsh MD   10 mL at 08/06/24 1006    sodium chloride 0.9 % flush 10 mL  10 mL Intravenous PRN Pablo Walsh MD        sodium chloride 0.9 % infusion 40 mL  40 mL Intravenous PRN Pablo Walsh MD        sodium chloride 0.9 % infusion  100 mL/hr Intravenous Continuous Pablo Walsh  mL/hr at 08/07/24 0003 100 mL/hr at 08/07/24 0003    traMADol (ULTRAM) tablet 50 mg  50 mg Oral Q6H PRN Sonam Shaw APRN   50 mg at 08/05/24 2229        Physician Progress Notes (last 48 hours)        Pablo Walsh MD at 08/06/24 1023          Patient Care Team:  Charly Dominguez APRN as PCP - General (Nurse Practitioner)  Suman Jeffrey PA as Referring Physician (Family Medicine)  Emmanuel Fu MD as Cardiologist (Cardiology)  Kandis Snider APRN as Nurse Practitioner (Family Medicine)  Phuc Jonse PA-C as Physician Assistant (Physician Assistant)  Mukund Franklin DO as Surgeon (Neurosurgery)  Gary Davis MD as Surgeon (General Surgery)    Subjective     Esha Boogie feeling better.  She  "stated had an uneventful last night with no diarrhea and more formed bowel movements.  She denies nausea or vomiting.   Tolerating p.o continues to have normal bowel movements now more formed no blood noted.       Review of Systems:     Review of Systems - General ROS: negative  Respiratory ROS: no cough, shortness of breath, or wheezing  Cardiovascular ROS: no chest pain or dyspnea on exertion  Gastrointestinal ROS: no abdominal pain, change in bowel habits, or black or bloody stools    Objective     Vital Signs  /50 (BP Location: Right arm, Patient Position: Lying)   Pulse 74   Temp 98.3 °F (36.8 °C)   Resp 18   Ht 147.3 cm (58\")   Wt 79.8 kg (176 lb)   SpO2 96%   BMI 36.78 kg/m²     Physical Exam:  Physical Exam  Constitutional:       General: She is not in acute distress.     Appearance: Normal appearance. She is obese. She is not ill-appearing or diaphoretic.   Abdominal:      General: A surgical scar is present. Bowel sounds are normal. There is no distension.      Palpations: Abdomen is soft.      Tenderness: There is no abdominal tenderness. There is no guarding or rebound.   Musculoskeletal:         General: No swelling or tenderness.   Neurological:      Mental Status: She is alert.           Results Review:    Labs reviewed    CBC          8/4/2024    07:32 8/4/2024    12:23 8/5/2024    09:33 8/6/2024    05:12   CBC   WBC 20.11   21.37  22.14    RBC 3.69   3.65  3.79    Hemoglobin 10.7     10.7  11.0  10.6  10.9    Hematocrit 33.1     33.3  33.2  32.9  33.6    MCV 90.2   90.1  88.7    MCH 29.0   29.0  28.8    MCHC 32.1   32.2  32.4    RDW 13.5   13.4  13.4    Platelets 308   319  356         Medication Reviewed:   Current Facility-Administered Medications   Medication Dose Route Frequency Provider Last Rate Last Admin    Calcium Replacement - Follow Nurse / BPA Driven Protocol   Does not apply PRN Pablo Walsh MD        diphenhydrAMINE (BENADRYL) injection 25 mg  25 mg Intravenous Q6H " PRN Pablo Walsh MD        famotidine (PEPCID) tablet 40 mg  40 mg Oral Daily Pablo Walsh MD   40 mg at 08/06/24 1006    Magnesium Standard Dose Replacement - Follow Nurse / BPA Driven Protocol   Does not apply PRN Pablo Walsh MD        ondansetron (ZOFRAN) injection 4 mg  4 mg Intravenous Q6H PRN Pablo Walsh MD        Phosphorus Replacement - Follow Nurse / BPA Driven Protocol   Does not apply PRN Pablo Walsh MD        Potassium Replacement - Follow Nurse / BPA Driven Protocol   Does not apply PRN Pablo Walsh MD        sodium chloride 0.9 % flush 10 mL  10 mL Intravenous PRN Ning Valadez APRN        sodium chloride 0.9 % flush 10 mL  10 mL Intravenous Q12H Pablo Walsh MD   10 mL at 08/06/24 1006    sodium chloride 0.9 % flush 10 mL  10 mL Intravenous PRN Pablo Walsh MD        sodium chloride 0.9 % infusion 40 mL  40 mL Intravenous PRN Pablo Walsh MD        sodium chloride 0.9 % infusion  100 mL/hr Intravenous Continuous Pablo Walsh  mL/hr at 08/05/24 0608 100 mL/hr at 08/05/24 0608    traMADol (ULTRAM) tablet 50 mg  50 mg Oral Q6H PRN Sonam Shaw APRN   50 mg at 08/05/24 2229       Assessment & Plan     H&H has remained stable.    White count still remains elevated.  Patient does have a history of pleural effusions and atelectasis on x-ray.  Possible intra-abdominal infection likely due to her tolerating p.o. and having bowel movements with decreased pain symptoms.    Patient was encouraged to utilize her incentive spirometer.  I discontinued the Zosyn yesterday.  Will consult infectious disease today.  Plan was discussed with patient and .        Pablo Walsh MD  08/06/24  10:23 CDT      Electronically signed by Pablo Walsh MD at 08/06/24 1026       Sonam Shaw APRN at 08/05/24 2209          Maryuri from  contacted myself about patients worsening pain. States it’s mostly with coughing and stable at rest. She also  "asked about clarifying some orders such as antibiotic treatment and serial H&H. Spoke with Dr Walsh and contacted Maryuri back and I ordered Ultram PRN for pain. Clarified that antibiotic treatment was D/C’d this AM, further work up for infection pending. Bloodwork ordered for the AM and no serial H&H needed at this time. Continue GI diet and I D/Cd occult blood test per Dr Walsh’s request. Nurse verbalized understanding.   Electronically signed by Sonam Shaw APRN at 08/05/24 2215       Pablo Walsh MD at 08/05/24 0806          Patient Care Team:  Charly Dominguez APRN as PCP - General (Nurse Practitioner)  Suman Jeffrey PA as Referring Physician (Family Medicine)  Emmanuel Fu MD as Cardiologist (Cardiology)  Kandis Snider APRN as Nurse Practitioner (Family Medicine)  Phuc Jones PA-C as Physician Assistant (Physician Assistant)  Mukund Franklin DO as Surgeon (Neurosurgery)  Gary Davis MD as Surgeon (General Surgery)    Subjective     Esha Boogie feeling better.  She stated this symptom has improved last night with no diarrhea and more formed bowel movements.  Her pain also has improved since last night.  She denies nausea or vomiting.  Tolerating clears.       Review of Systems:     Review of Systems - General ROS: negative  Respiratory ROS: no cough, shortness of breath, or wheezing  Cardiovascular ROS: no chest pain or dyspnea on exertion  Gastrointestinal ROS: no abdominal pain, change in bowel habits, or black or bloody stools    Objective     Vital Signs  /52 (BP Location: Left arm, Patient Position: Lying)   Pulse 79   Temp 98.5 °F (36.9 °C) (Oral)   Resp 18   Ht 147.3 cm (58\")   Wt 79.8 kg (176 lb)   SpO2 94%   BMI 36.78 kg/m²     Physical Exam:  Physical Exam  Constitutional:       General: She is not in acute distress.     Appearance: Normal appearance. She is obese. She is not ill-appearing or diaphoretic.   Abdominal:      General: A surgical scar " is present. Bowel sounds are normal. There is no distension.      Palpations: Abdomen is soft.      Tenderness: There is no abdominal tenderness. There is no guarding or rebound.   Musculoskeletal:         General: No swelling or tenderness.   Neurological:      Mental Status: She is alert.           Results Review:    Labs reviewed    CBC          8/3/2024    15:47 8/3/2024    22:51 8/4/2024    07:32 8/4/2024    12:23 8/5/2024    09:33   CBC   WBC 22.68   20.11   21.37    RBC 3.87   3.69   3.65    Hemoglobin 11.5  11.0  10.7     10.7  11.0  10.6    Hematocrit 34.3  33.4  33.1     33.3  33.2  32.9    MCV 88.6   90.2   90.1    MCH 29.7   29.0   29.0    MCHC 33.5   32.1   32.2    RDW 13.5   13.5   13.4    Platelets 356   308   319         Medication Reviewed:   Current Facility-Administered Medications   Medication Dose Route Frequency Provider Last Rate Last Admin    Calcium Replacement - Follow Nurse / BPA Driven Protocol   Does not apply PRN Pablo Walsh MD        dexAMETHasone (DECADRON) injection 4 mg  4 mg Intravenous Q8H PRN Pablo Walsh MD        diphenhydrAMINE (BENADRYL) injection 25 mg  25 mg Intravenous Q6H PRN Pablo Walsh MD        famotidine (PEPCID) tablet 40 mg  40 mg Oral Daily Pablo Walsh MD   40 mg at 08/05/24 0846    Magnesium Standard Dose Replacement - Follow Nurse / BPA Driven Protocol   Does not apply PRPablo Kelly MD        ondansetron (ZOFRAN) injection 4 mg  4 mg Intravenous Q6H PRN Pablo Walsh MD        Phosphorus Replacement - Follow Nurse / BPA Driven Protocol   Does not apply PRPablo Kelly MD        piperacillin-tazobactam (ZOSYN) 3.375 g IVPB in 100 mL NS MBP (CD)  3.375 g Intravenous Q8H Pablo Walsh MD 0 mL/hr at 08/05/24 0030 Currently Infusing at 08/05/24 0642    Potassium Replacement - Follow Nurse / BPA Driven Protocol   Does not apply PRPablo Kelly MD        sodium chloride 0.9 % flush 10 mL  10 mL Intravenous PRN Allyson,  CASSIDY Brown        sodium chloride 0.9 % flush 10 mL  10 mL Intravenous Q12H Pablo Walsh MD   10 mL at 08/04/24 2036    sodium chloride 0.9 % flush 10 mL  10 mL Intravenous PRN Pablo Walsh MD        sodium chloride 0.9 % infusion 40 mL  40 mL Intravenous PRN Pablo Walsh MD        sodium chloride 0.9 % infusion  100 mL/hr Intravenous Continuous Pablo Walsh  mL/hr at 08/05/24 0608 100 mL/hr at 08/05/24 0608       Assessment & Plan     H&H has remained stable as well as the patient's status.  Will continue treatment for diverticulitis also due to her left lower quadrant pain which also has improved.  Will advance diet today.  Possible discharge home soon.    Addendum: White count still remains elevated.  Patient does have a history of pleural effusions found on CT as well.  Possible intra-abdominal infection but improving of her symptoms.  Will order a chest x-ray to assess the progression of pulmonary status.  Patient is satting 90+ percent on room air and without evidence of respiratory distress.          Pablo Walsh MD  08/05/24  12:21 CDT      Electronically signed by Pablo Walsh MD at 08/05/24 1223          Consult Notes (last 48 hours)        Chichi Groves MD at 08/06/24 1135        Consult Orders    1. Inpatient Infectious Diseases Consult [[id number]] ordered by Pablo Walsh MD                 INFECTIOUS DISEASES CONSULT NOTE    Patient:  Esha Boogie 74 y.o. female  ROOM # 389/1  YOB: 1950  MRN: [id number]  [facility]:  [id number]  Admit date: 8/3/2024   Admitting Physician: Pablo Walsh MD  Primary Care Physician: Charly Dominguez APRN  REFERRING PROVIDER: Pablo Walsh MD    Inpatient Infectious Diseases Consult  Consult performed by: Chichi Groves MD  Consult ordered by: Pablo Walsh MD          REASON FOR CONSULTATION : leukocytosis      HISTORY OF PRESENT ILLNESS: Patient is a pleasant 74-year-old female who  had history supplemented by her .  She underwent cholecystectomy July 26 and went to [facility] for ERCP and stent placement per Dr. Sanjeev Franklin.  She was discharged home and said she progressively became weaker.  She denied chest pain, shortness of breath, did have some left lower quadrant to mid abdominal pain and then had a bloody bowel movement.  She said she initially thought she had strained too hard as she had been quite constipated and it was a little harder stool than usual however she did had diarrhea with bloody stool and subsequently presented to the emergency department.    She has not had fevers or chills.  She had some episodic urgency and she said she thought once or twice perhaps she had a urinary tract infection but that has not been overwhelming.    She reports her abdominal pain is improved.  She had a normal bowel movement today.  She feels stronger than she did when she came into the hospital.  She is able to ambulate around the room by herself without difficulty.    She has not had any other ill contacts.  She denies shortness of breath, sore throat, rash, pain at previous IV site.    Past Medical History:   Diagnosis Date    Arthritis     Bleeding tendency     CHF (congestive heart failure)     Diabetes mellitus     GERD (gastroesophageal reflux disease)     Gout     Hearing loss     HEARING AIDS    Hyperlipidemia     Hypertension     Peripheral vascular disease     PONV (postoperative nausea and vomiting)     Sleep apnea     Yeast infection     PRONE TO YEAST     Past Surgical History:   Procedure Laterality Date    BREAST BIOPSY Right     CHOLECYSTECTOMY N/A 7/26/2024    Procedure: LAPAROSCOPIC CHOLECYSTECTOMY INTRAOPERATIVE CHOLANGIOGRAM WITH [employer] ROB[employer];  Surgeon: Gary Davis MD;  Location: [facility];  Service: Robotics - DaVinci;  Laterality: N/A;    ENDOSCOPY  07/2023    HYSTERECTOMY      Partial    TOTAL HIP ARTHROPLASTY Bilateral     VARICOSE VEIN SURGERY Right  "2022    Procedure: RIGHT SAPHENOUS VEIN RADIO FREQUENCY ABLATION;  Surgeon: Serafin Sher DO;  Location:  [facility];  Service: Vascular;  Laterality: Right;    VARICOSE VEIN SURGERY Left 2023    Procedure: LEFT SAPHENOUS VEIN RADIO FREQUENCY ABLATION;  Surgeon: Serafin Sher DO;  Location:  [facility];  Service: Vascular;  Laterality: Left;     Family History   Problem Relation Age of Onset    Stroke Mother     Cancer Father     Breast cancer Neg Hx      Social History     Socioeconomic History    Marital status:    Tobacco Use    Smoking status: Never    Smokeless tobacco: Never   Vaping Use    Vaping status: Never Used   Substance and Sexual Activity    Alcohol use: Never    Drug use: Never    Sexual activity: Defer       Current Scheduled Medications:   famotidine, 40 mg, Oral, Daily  sodium chloride, 10 mL, Intravenous, Q12H              Current PRN Medications:    Calcium Replacement - Follow Nurse / BPA Driven Protocol    diphenhydrAMINE    Magnesium Standard Dose Replacement - Follow Nurse / BPA Driven Protocol    ondansetron    Phosphorus Replacement - Follow Nurse / BPA Driven Protocol    Potassium Replacement - Follow Nurse / BPA Driven Protocol    [COMPLETED] Insert Peripheral IV **AND** sodium chloride    sodium chloride    sodium chloride    traMADol      sodium chloride, 100 mL/hr, Last Rate: 100 mL/hr (24 1457)             Allergies   Allergen Reactions    Entresto [Sacubitril-Valsartan] Itching and Rash     YEAST    Farxiga [Dapagliflozin] Itching     Itching and yeast infection     Jardiance [Empagliflozin] Itching    Latex Rash    Silicone Rash           Vital Signs:  /42 (BP Location: Right arm, Patient Position: Lying)   Pulse 77   Temp 98 °F (36.7 °C)   Resp 18   Ht 147.3 cm (58\")   Wt 79.8 kg (176 lb)   SpO2 100%   BMI 36.78 kg/m²   Temp (24hrs), Av.9 °F (36.6 °C), Min:97.2 °F (36.2 °C), Max:98.4 °F (36.9 °C)      Physical " Exam    General: Patient is an obese female sitting up in recliner in no acute distress  Lungs: Clear bilaterally  Heart: S1-S2 rate is regular  Abdomen: Incision sites appear clean dry and intact.  Patient has soft abdomen without any rebound or guarding.  IV site without phlebitis  No obvious joint effusions increased warmth or erythema    Results Review:    I reviewed the patient's new clinical results.    Lab Results:    CBC:   Lab Results   Lab 08/03/24  1547 08/03/24  2251 08/04/24  0732 08/04/24  1223 08/05/24  0933 08/06/24  0512   WBC 22.68*  --  20.11*  --  21.37* 22.14*   HEMOGLOBIN 11.5*   < > 10.7*  10.7* 11.0* 10.6* 10.9*   HEMATOCRIT 34.3   < > 33.3*  33.1* 33.2* 32.9* 33.6*   PLATELETS 356  --  308  --  319 356    < > = values in this interval not displayed.        AutoDiff:   Lab Results   Lab 08/03/24  1547 08/04/24 0732 08/06/24  0512   NEUTROPHIL %  --  76.8*  --    LYMPHOCYTE %  --  12.4*  --    MONOCYTES %  --  6.7  --    EOSINOPHIL %  --  0.8  --    BASOPHIL %  --  0.4  --    NEUTROS ABS 19.57* 15.44* 17.66*   LYMPHS ABS  --  2.50  --    MONOS ABS  --  1.34*  --    EOS ABS 0.23 0.17 0.00   BASOS ABS 0.00 0.08 0.00        Manual Diff:    Lab Results   Lab 08/03/24  1547 08/04/24  0732 08/06/24 0512   NEUTROPHIL % 83.3*  --  70.0   LYMPHOCYTE % 6.9*  --  16.0*   MONOCYTES % 2.0*  --  4.0*   BANDS % 2.9  --  7.0*   METAMYELOCYTE % 2.0*  --   --    NEUTROS ABS 19.57* 15.44* 17.66*   LYMPHS ABS 1.56  --  4.36*   POIKILOCYTES  --   --  Slight/1+   WBC MORPHOLOGY  --   --  Normal   PLT MORPH Normal  --  Normal        CMP:   Lab Results   Lab 08/04/24  0732 08/05/24  0424 08/05/24  0933 08/06/24  0512   SODIUM 137  137 137 136 138   POTASSIUM 4.2  4.2 3.7 4.0 3.7   CHLORIDE 101  101 100 100 103   CO2 25.0  27.0 25.0 21.0* 25.0   BUN 18  18 8 7* 6*   CREATININE 0.71  0.71 0.61 0.69 0.60   CALCIUM 9.4  9.4 9.5 9.1 9.1   BILIRUBIN 0.4  --  0.3 0.2   ALK PHOS 67  --  71 75   ALT (SGPT) 17  --   16 15   AST (SGOT) 12  --  15 13   GLUCOSE 124*  124* 125* 212* 116*       Lab Results (last 72 hours)       Procedure Component Value Units Date/Time    Comprehensive Metabolic Panel [[id number]]  (Abnormal) Collected: 08/06/24 0512    Specimen: Blood Updated: 08/06/24 0602     Glucose 116 mg/dL      BUN 6 mg/dL      Creatinine 0.60 mg/dL      Sodium 138 mmol/L      Potassium 3.7 mmol/L      Chloride 103 mmol/L      CO2 25.0 mmol/L      Calcium 9.1 mg/dL      Total Protein 6.4 g/dL      Albumin 3.2 g/dL      ALT (SGPT) 15 U/L      AST (SGOT) 13 U/L      Alkaline Phosphatase 75 U/L      Total Bilirubin 0.2 mg/dL      Globulin 3.2 gm/dL      A/G Ratio 1.0 g/dL      BUN/Creatinine Ratio 10.0     Anion Gap 10.0 mmol/L      eGFR 93.7 mL/min/1.73     Narrative:      GFR Normal >60  Chronic Kidney Disease <60  Kidney Failure <15    The GFR formula is only valid for adults with stable renal function between ages 18 and 70.    CBC (No Diff) [[id number]]  (Abnormal) Collected: 08/06/24 0512    Specimen: Blood Updated: 08/06/24 0537     WBC 22.14 10*3/mm3      RBC 3.79 10*6/mm3      Hemoglobin 10.9 g/dL      Hematocrit 33.6 %      MCV 88.7 fL      MCH 28.8 pg      MCHC 32.4 g/dL      RDW 13.4 %      RDW-SD 44.0 fl      MPV 10.4 fL      Platelets 356 10*3/mm3     Comprehensive Metabolic Panel [[id number]]  (Abnormal) Collected: 08/05/24 0933    Specimen: Blood Updated: 08/05/24 1102     Glucose 212 mg/dL      BUN 7 mg/dL      Creatinine 0.69 mg/dL      Sodium 136 mmol/L      Potassium 4.0 mmol/L      Comment: Slight hemolysis detected by analyzer. Result may be falsely elevated.        Chloride 100 mmol/L      CO2 21.0 mmol/L      Calcium 9.1 mg/dL      Total Protein 6.1 g/dL      Albumin 3.0 g/dL      ALT (SGPT) 16 U/L      AST (SGOT) 15 U/L      Comment: Slight hemolysis detected by analyzer. Result may be falsely elevated.        Alkaline Phosphatase 71 U/L      Total Bilirubin 0.3 mg/dL      Globulin 3.1 gm/dL      A/G Ratio  1.0 g/dL      BUN/Creatinine Ratio 10.1     Anion Gap 15.0 mmol/L      eGFR 90.6 mL/min/1.73     Narrative:      GFR Normal >60  Chronic Kidney Disease <60  Kidney Failure <15    The GFR formula is only valid for adults with stable renal function between ages 18 and 70.    CBC (No Diff) [[id number]]  (Abnormal) Collected: 08/05/24 0933    Specimen: Blood Updated: 08/05/24 1028     WBC 21.37 10*3/mm3      RBC 3.65 10*6/mm3      Hemoglobin 10.6 g/dL      Hematocrit 32.9 %      MCV 90.1 fL      MCH 29.0 pg      MCHC 32.2 g/dL      RDW 13.4 %      RDW-SD 44.7 fl      MPV 11.2 fL      Platelets 319 10*3/mm3     Basic Metabolic Panel [[id number]]  (Abnormal) Collected: 08/05/24 0424    Specimen: Blood Updated: 08/05/24 0553     Glucose 125 mg/dL      BUN 8 mg/dL      Creatinine 0.61 mg/dL      Sodium 137 mmol/L      Potassium 3.7 mmol/L      Chloride 100 mmol/L      CO2 25.0 mmol/L      Calcium 9.5 mg/dL      BUN/Creatinine Ratio 13.1     Anion Gap 12.0 mmol/L      eGFR 93.4 mL/min/1.73     Narrative:      GFR Normal >60  Chronic Kidney Disease <60  Kidney Failure <15    The GFR formula is only valid for adults with stable renal function between ages 18 and 70.    Hemoglobin & Hematocrit, Blood [[id number]]  (Abnormal) Collected: 08/04/24 1223    Specimen: Blood Updated: 08/04/24 1324     Hemoglobin 11.0 g/dL      Hematocrit 33.2 %     Comprehensive Metabolic Panel [[id number]]  (Abnormal) Collected: 08/04/24 0732    Specimen: Blood Updated: 08/04/24 0847     Glucose 124 mg/dL      BUN 18 mg/dL      Creatinine 0.71 mg/dL      Sodium 137 mmol/L      Potassium 4.2 mmol/L      Chloride 101 mmol/L      CO2 25.0 mmol/L      Calcium 9.4 mg/dL      Total Protein 6.0 g/dL      Albumin 3.1 g/dL      ALT (SGPT) 17 U/L      AST (SGOT) 12 U/L      Alkaline Phosphatase 67 U/L      Total Bilirubin 0.4 mg/dL      Globulin 2.9 gm/dL      A/G Ratio 1.1 g/dL      BUN/Creatinine Ratio 25.4     Anion Gap 11.0 mmol/L      eGFR 88.8 mL/min/1.73      Narrative:      GFR Normal >60  Chronic Kidney Disease <60  Kidney Failure <15    The GFR formula is only valid for adults with stable renal function between ages 18 and 70.    Basic Metabolic Panel [[id number]]  (Abnormal) Collected: 08/04/24 0732    Specimen: Blood Updated: 08/04/24 0827     Glucose 124 mg/dL      BUN 18 mg/dL      Creatinine 0.71 mg/dL      Sodium 137 mmol/L      Potassium 4.2 mmol/L      Chloride 101 mmol/L      CO2 27.0 mmol/L      Calcium 9.4 mg/dL      BUN/Creatinine Ratio 25.4     Anion Gap 9.0 mmol/L      eGFR 88.8 mL/min/1.73     Narrative:      GFR Normal >60  Chronic Kidney Disease <60  Kidney Failure <15    The GFR formula is only valid for adults with stable renal function between ages 18 and 70.    CBC & Differential [[id number]]  (Abnormal) Collected: 08/04/24 0732    Specimen: Blood Updated: 08/04/24 0818    Narrative:      The following orders were created for panel order CBC & Differential.  Procedure                               Abnormality         Status                     ---------                               -----------         ------                     CBC Auto Differential[[id number]]        Abnormal            Final result                 Please view results for these tests on the individual orders.    CBC Auto Differential [[id number]]  (Abnormal) Collected: 08/04/24 0732    Specimen: Blood Updated: 08/04/24 0818     WBC 20.11 10*3/mm3      RBC 3.69 10*6/mm3      Hemoglobin 10.7 g/dL      Hematocrit 33.3 %      MCV 90.2 fL      MCH 29.0 pg      MCHC 32.1 g/dL      RDW 13.5 %      RDW-SD 44.9 fl      MPV 10.5 fL      Platelets 308 10*3/mm3      Neutrophil % 76.8 %      Lymphocyte % 12.4 %      Monocyte % 6.7 %      Eosinophil % 0.8 %      Basophil % 0.4 %      Immature Grans % 2.9 %      Neutrophils, Absolute 15.44 10*3/mm3      Lymphocytes, Absolute 2.50 10*3/mm3      Monocytes, Absolute 1.34 10*3/mm3      Eosinophils, Absolute 0.17 10*3/mm3      Basophils, Absolute  0.08 10*3/mm3      Immature Grans, Absolute 0.58 10*3/mm3      nRBC 0.0 /100 WBC     Hemoglobin & Hematocrit, Blood [[id number]]  (Abnormal) Collected: 08/04/24 0732    Specimen: Blood Updated: 08/04/24 0759     Hemoglobin 10.7 g/dL      Hematocrit 33.1 %     Hemoglobin & Hematocrit, Blood [[id number]]  (Abnormal) Collected: 08/03/24 2251    Specimen: Blood Updated: 08/03/24 2321     Hemoglobin 11.0 g/dL      Hematocrit 33.4 %     Basic Metabolic Panel [[id number]]  (Abnormal) Collected: 08/03/24 1948    Specimen: Blood Updated: 08/03/24 2025     Glucose 117 mg/dL      BUN 30 mg/dL      Creatinine 0.87 mg/dL      Sodium 136 mmol/L      Potassium 4.1 mmol/L      Chloride 99 mmol/L      CO2 26.0 mmol/L      Calcium 9.6 mg/dL      BUN/Creatinine Ratio 34.5     Anion Gap 11.0 mmol/L      eGFR 69.6 mL/min/1.73     Narrative:      GFR Normal >60  Chronic Kidney Disease <60  Kidney Failure <15    The GFR formula is only valid for adults with stable renal function between ages 18 and 70.    Urinalysis With Culture If Indicated - Urine, Clean Catch [[id number]]  (Normal) Collected: 08/03/24 1753    Specimen: Urine, Clean Catch Updated: 08/03/24 1825     Color, UA Yellow     Appearance, UA Clear     pH, UA 5.5     Specific Gravity, UA >=1.030     Glucose, UA Negative     Ketones, UA Negative     Bilirubin, UA Negative     Blood, UA Negative     Protein, UA Negative     Leuk Esterase, UA Negative     Nitrite, UA Negative     Urobilinogen, UA 0.2 E.U./dL    Narrative:      In absence of clinical symptoms, the presence of pyuria, bacteria, and/or nitrites on the urinalysis result does not correlate with infection.  Urine microscopic not indicated.    CBC & Differential [[id number]]  (Abnormal) Collected: 08/03/24 1547    Specimen: Blood Updated: 08/03/24 1625    Narrative:      The following orders were created for panel order CBC & Differential.  Procedure                               Abnormality         Status                      ---------                               -----------         ------                     CBC Auto Differential[[id number]]        Abnormal            Final result                 Please view results for these tests on the individual orders.    CBC Auto Differential [[id number]]  (Abnormal) Collected: 08/03/24 1547    Specimen: Blood Updated: 08/03/24 1625     WBC 22.68 10*3/mm3      RBC 3.87 10*6/mm3      Hemoglobin 11.5 g/dL      Hematocrit 34.3 %      MCV 88.6 fL      MCH 29.7 pg      MCHC 33.5 g/dL      RDW 13.5 %      RDW-SD 43.7 fl      MPV 10.5 fL      Platelets 356 10*3/mm3     Narrative:      The previously reported component NRBC is no longer being reported. Previous result was 0.0 /100 WBC (Reference Range: 0.0-0.2 /100 WBC) on 8/3/2024 at 1559 CDT.    Manual Differential [[id number]]  (Abnormal) Collected: 08/03/24 1547    Specimen: Blood Updated: 08/03/24 1625     Neutrophil % 83.3 %      Lymphocyte % 6.9 %      Monocyte % 2.0 %      Eosinophil % 1.0 %      Basophil % 0.0 %      Bands %  2.9 %      Metamyelocyte % 2.0 %      Myelocyte % 2.0 %      Neutrophils Absolute 19.57 10*3/mm3      Lymphocytes Absolute 1.56 10*3/mm3      Monocytes Absolute 0.45 10*3/mm3      Eosinophils Absolute 0.23 10*3/mm3      Basophils Absolute 0.00 10*3/mm3      Polychromasia Slight/1+     Vacuolated Neutrophils Slight/1+     Platelet Morphology Normal    Comprehensive Metabolic Panel [[id number]]  (Abnormal) Collected: 08/03/24 1547    Specimen: Blood Updated: 08/03/24 1622     Glucose 148 mg/dL      BUN 35 mg/dL      Creatinine 1.02 mg/dL      Sodium 130 mmol/L      Potassium 4.4 mmol/L      Comment: Slight hemolysis detected by analyzer. Result may be falsely elevated.        Chloride 96 mmol/L      CO2 23.0 mmol/L      Calcium 9.9 mg/dL      Total Protein 6.7 g/dL      Albumin 3.4 g/dL      ALT (SGPT) 24 U/L      AST (SGOT) 16 U/L      Comment: Slight hemolysis detected by analyzer. Result may be falsely elevated.         Alkaline Phosphatase 78 U/L      Total Bilirubin 0.2 mg/dL      Globulin 3.3 gm/dL      A/G Ratio 1.0 g/dL      BUN/Creatinine Ratio 34.3     Anion Gap 11.0 mmol/L      eGFR 57.5 mL/min/1.73     Narrative:      GFR Normal >60  Chronic Kidney Disease <60  Kidney Failure <15    The GFR formula is only valid for adults with stable renal function between ages 18 and 70.    Magnesium [[id number]]  (Normal) Collected: 08/03/24 1547    Specimen: Blood Updated: 08/03/24 1622     Magnesium 1.6 mg/dL     Lactic Acid, Plasma [[id number]]  (Normal) Collected: 08/03/24 1547    Specimen: Blood Updated: 08/03/24 1611     Lactate 1.4 mmol/L     Lipase [[id number]]  (Normal) Collected: 08/03/24 1547    Specimen: Blood Updated: 08/03/24 1610     Lipase 30 U/L     Protime-INR [[id number]]  (Normal) Collected: 08/03/24 1547    Specimen: Blood Updated: 08/03/24 1607     Protime 13.6 Seconds      INR 1.00    aPTT [[id number]]  (Normal) Collected: 08/03/24 1547    Specimen: Blood Updated: 08/03/24 1607     PTT 27.7 seconds             Estimated Creatinine Clearance: 77 mL/min (by C-G formula based on SCr of 0.6 mg/dL).    Culture Results:    Microbiology Results (last 10 days)       ** No results found for the last 240 hours. **               Radiology:           Imaging Results (Last 72 Hours)       Procedure Component Value Units Date/Time    XR Chest PA & Lateral [[id number]] Collected: 08/05/24 1257     Updated: 08/05/24 1301    Narrative:      EXAMINATION: XR CHEST PA AND LATERAL-  8/5/2024 12:57 PM     HISTORY: Pleural effusions.     FINDINGS: 2 view exam of the chest demonstrates minimal effusions with  blunting of the lateral and posterior costophrenic angles. The lungs are  otherwise clear. No consolidative pneumonia. No free air beneath the  diaphragms.       Impression:      1.. Minimal bilateral pleural effusions with blunting of the lateral and  posterior costophrenic angles. The lungs are otherwise clear.     This report was  signed and finalized on 8/5/2024 12:58 PM by Dr. Rajinder Rodgers MD.       CT Abdomen Pelvis With Contrast [[id number]] Collected: 08/03/24 1713     Updated: 08/03/24 1721    Narrative:      EXAM: CT ABDOMEN PELVIS W CONTRAST-      DATE: 8/3/2024 3:10 PM     HISTORY: abdominal pain, recent post op gallbladder and ERCP, now bloody  stool       COMPARISON: None available.     DOSE LENGTH PRODUCT: 437.61 mGy.cm Automatic exposure control was  utilized to make radiation dose as low as reasonably achievable.     TECHNIQUE: Contrast enhanced axial images of the abdomen and pelvis  obtained with multiplanar reformats.     FINDINGS:  VISUALIZED CHEST: No pericardial effusion is seen. There are trace  bilateral pleural effusions and atelectasis.     LIVER/BILE DUCTS: The patient is status post cholecystectomy. There is a  common bile duct stent. Mild focal fatty infiltration of the liver is  noted along the falciform ligament. Fluid and stranding at the  gallbladder fossa is likely postoperative. No localized fluid collection  is seen. Portal vein branches are patent.     KIDNEYS/URETERS: The bilateral kidneys and visualized ureters are  unremarkable. There is no hydronephrosis.     ADRENAL: Unremarkable.     SPLEEN: Unremarkable.     PANCREAS: Unremarkable.     MESENTERY: Again there are postoperative changes of the mesentery at the  right upper quadrant. No lymphadenopathy or free fluid is seen.     VASCULATURE: There is atherosclerosis of the abdominal aorta without  aneurysm formation. Vasculature is intact without stenosis or occlusion.     GI TRACT: There is no bowel obstruction. The abdominal portion of the GI  tract is unremarkable.     PELVIS: There is diverticulosis of the sigmoid colon without definite  acute diverticulitis. Extensive metallic streak artifact from bilateral  total hip arthroplasty somewhat obscures visualization of the pelvis. No  lymphadenopathy or free fluid is seen.     SOFT TISSUES: There are  postoperative changes of the subcutaneous soft  tissues anteriorly.     BONES: No acute or aggressive bony lesion.           Impression:      1. Postoperative change of recent cholecystectomy. There is also a  common bile duct stent.  2. Sigmoid colon diverticulosis without visualized diverticulitis.  3. Visualization of the pelvis somewhat obscured due to metallic streak  artifact from bilateral total hip arthroplasty.  4. Small bilateral pleural effusions with atelectasis.        This report was signed and finalized on 8/3/2024 5:17 PM by Amauri Bolanos.                 HOSPITAL PROBLEM LIST:      Diverticulosis large intestine w/o perforation or abscess w/bleeding      IMPRESSION:  Leukocytosis-fairly stable since admission.  White count had been 20,000 postoperatively then down to 14,000 on July 28.  Currently she does describe an episode of progressive weakness at home marked by worsening abdominal pain and bloody bowel movements after period of several days of constipation.  Clinically patient feels well, still has abdominal pain but it is improved, reports her strength has improved and she is tolerating p.o.  Additionally she has what she considers a normal bowel movement today.  No steroid dosing to explain elevated white count.  She has minimal complaints of urgency and admission UA was unremarkable but will repeat clean-catch UA with reflex to culture        RECOMMENDATION:   Add manual diff to cbc-done  UA clean catch with reflex - d/w patient and nursing re: proper collection  Hold further abx repeat CBC with differential in AM  Monitor hemodynamics and history closely.  If patient remains clinically improved, could consider discharge soon with close outpatient follow-up and possible repeat CT scan of abdomen and pelvis if abdominal pain persist       Discussed with Dr. Walsh earlier  Chichi Groves MD  08/06/24  17:07 CDT          Electronically signed by Chichi Groves MD at 08/06/24  3466

## 2024-08-07 NOTE — DISCHARGE SUMMARY
"  Date of Discharge:  8/7/2024    Discharge Diagnosis: Diverticulosis large intestine w/o perforation or abscess w/bleeding [K57.31]    Presenting Problem/History of Present Illness  Diverticulosis large intestine w/o perforation or abscess w/bleeding [K57.31]       Hospital Course  Patient is a 74 y.o. female presented with bloody diarrhea status post laparoscopic cholecystectomy.  Due to the bloody diarrhea she visited the emergency department.  She was admitted and observed initially however her white count was also quite elevated on admission.  She complained of left lower quadrant pain which could have been consistent with her postoperative surgery however with the CT findings of diverticulosis she was placed on antibiotics.  Her white count remained elevated.  Her H&H remained stable throughout her hospital course as well as her abdominal symptoms improving.  She tolerated p.o. diet and continue to have bowel movements with less blood.  More consistent and formed bowel movements as well.  Infectious disease was consulted due to the persistent leukocytosis.  She was discharged home in a stable condition with a low fiber residual diet regimen to follow as well as follow-up with her primary care provider and surgeon who performed her initial procedure.    Procedures Performed         Consults:   Consults       Date and Time Order Name Status Description    8/6/2024  9:57 AM Inpatient Infectious Diseases Consult Completed               Condition on Discharge:  Stable    Vital Signs  /66 (BP Location: Right arm, Patient Position: Lying)   Pulse 72   Temp 98.2 °F (36.8 °C)   Resp 16   Ht 147.3 cm (58\")   Wt 79.8 kg (176 lb)   SpO2 97%   BMI 36.78 kg/m²     Physical Exam:  General Appearance: alert, appears stated age, and cooperative  Lungs: clear to auscultation, respirations regular, respirations even, and respirations unlabored  Heart: regular rhythm & normal rate, normal S1, S2, no murmur, no " gallop, no rub, and no click  Abdomen: normal bowel sounds, no masses, no hepatomegaly, no splenomegaly, soft non-tender, no guarding, and no rebound tenderness    Discharge Disposition  Home or Self Care    Discharge Medications     Discharge Medications        New Medications        Instructions Start Date   traMADol 50 MG tablet  Commonly known as: Ultram   50 mg, Oral, Every 8 Hours PRN             Continue These Medications        Instructions Start Date   cloNIDine 0.1 MG tablet  Commonly known as: CATAPRES   0.1 mg, Oral, Daily PRN      gabapentin 100 MG capsule  Commonly known as: NEURONTIN   100 mg, Oral, 3 Times Daily      hydroCHLOROthiazide 50 MG tablet   50 mg, Oral, Daily      lisinopril 40 MG tablet  Commonly known as: PRINIVIL,ZESTRIL   40 mg, Oral, Daily      loratadine 10 MG tablet  Commonly known as: CLARITIN   10 mg, Oral, Daily      metoprolol succinate  MG 24 hr tablet  Commonly known as: TOPROL-XL   100 mg, Oral, Daily      multivitamin with minerals tablet tablet   1 tablet, Oral, Daily      NON FORMULARY   2 tablets, Oral, Daily, ANCIENT NUTRITION COLLAGEN SUPPLEMENT       nystatin [id number] UNIT/GM powder  Commonly known as: MYCOSTATIN   Apply 1 Application topically to the appropriate area as directed 2 (Two) Times a Day As Needed (yeast infection).      omeprazole 20 MG capsule  Commonly known as: priLOSEC   20 mg, Oral, Daily      simvastatin 20 MG tablet  Commonly known as: ZOCOR   20 mg, Oral, Nightly      spironolactone 50 MG tablet  Commonly known as: ALDACTONE   50 mg, Oral, Daily               Discharge Diet: Low fiber diet.    Activity at Discharge: Weight restrictions per general surgeons recommendations no greater than 10 to 15 pounds for at least 4 weeks.    Follow-up Appointments: See below    Future Appointments   Date Time Provider Department Center   8/13/2024  2:00 PM Gary Davis MD [facility] PAD   8/26/2024 11:00 AM PAD  [facility] 3  PAD [facility]   8/28/2024   1:45 PM Viktoria Rizzo APRN [facility]AD PAD   10/2/2024 11:00 AM Mukund Franklin,  [facility] PAD PAD   10/8/2024 10:00 AM Kandis Snider APRN [facility]  PAD     Additional Instructions for the Follow-ups that You Need to Schedule       CBC Auto Differential    Aug 14, 2024 (Approximate)      Release to patient: Routine Release                Test Results Pending at Discharge: CBC       Pablo Walsh MD  08/07/24  11:00 CDT

## 2024-08-08 NOTE — OUTREACH NOTE
Prep Survey      Flowsheet Row Responses   [facility] patient discharged from? [facility]   Is LACE score < 7 ? No   Eligibility Readm Mgmt   Discharge diagnosis Diverticulosis large intestine w/o perforation or abscess w/bleeding   Does the patient have one of the following disease processes/diagnoses(primary or secondary)? Other   Does the patient have Home health ordered? No   Is there a DME ordered? No   Prep survey completed? Yes            Janee Mckoy Registered Nurse

## 2024-08-08 NOTE — PAYOR COMM NOTE
"REF:   [id number]     [facility]  FAX  [phone]     Esha Quintana (74 y.o. Female)       Date of Birth   1950    Social Security Number       Address   [address] [address]    Home Phone   [phone]    MRN   [id number]       [facility]    Marital Status                               Admission Date   8/3/24    Admission Type   Emergency    Admitting Provider   Pablo Walsh MD    Attending Provider       Department, Room/Bed   [facility] 3C, 389/1       Discharge Date   8/7/2024    Discharge Disposition   Home or Self Care    Discharge Destination                                 Attending Provider: (none)   Allergies: Entresto [Sacubitril-valsartan], Farxiga [Dapagliflozin], Jardiance [Empagliflozin], Latex, Silicone    Isolation: None   Infection: None   Code Status: Prior    Ht: 147.3 cm (58\")   Wt: 79.8 kg (176 lb)    Admission Cmt: None   Principal Problem: Diverticulosis large intestine w/o perforation or abscess w/bleeding [K57.31]                   Active Insurance as of 8/3/2024       Primary Coverage       Payor Plan Insurance Group Employer/Plan Group    AETNA MEDICARE REPLACEMENT AETNA MEDICARE REPLACEMENT [id number]       Payor Plan Address Payor Plan Phone Number Payor Plan Fax Number Effective Dates    [address] [phone]  1/1/2023 - None Entered    [facility] [id number]         Subscriber Name Subscriber Birth Date Member ID       ESHA QUINTANA 1950 [id number]                     Emergency Contacts        (Rel.) Home Phone Work Phone Mobile Phone    Randal Quintana (Spouse) [phone] -- [phone]                 Discharge Summary        Pablo Walsh MD at 08/07/24 1100            Date of Discharge:  8/7/2024    Discharge Diagnosis: Diverticulosis large intestine w/o perforation or abscess w/bleeding [K57.31]    Presenting Problem/History of Present Illness  Diverticulosis large " "intestine w/o perforation or abscess w/bleeding [K57.31]       Hospital Course  Patient is a 74 y.o. female presented with bloody diarrhea status post laparoscopic cholecystectomy.  Due to the bloody diarrhea she visited the emergency department.  She was admitted and observed initially however her white count was also quite elevated on admission.  She complained of left lower quadrant pain which could have been consistent with her postoperative surgery however with the CT findings of diverticulosis she was placed on antibiotics.  Her white count remained elevated.  Her H&H remained stable throughout her hospital course as well as her abdominal symptoms improving.  She tolerated p.o. diet and continue to have bowel movements with less blood.  More consistent and formed bowel movements as well.  Infectious disease was consulted due to the persistent leukocytosis.  She was discharged home in a stable condition with a low fiber residual diet regimen to follow as well as follow-up with her primary care provider and surgeon who performed her initial procedure.    Procedures Performed         Consults:   Consults       Date and Time Order Name Status Description    8/6/2024  9:57 AM Inpatient Infectious Diseases Consult Completed               Condition on Discharge:  Stable    Vital Signs  /66 (BP Location: Right arm, Patient Position: Lying)   Pulse 72   Temp 98.2 °F (36.8 °C)   Resp 16   Ht 147.3 cm (58\")   Wt 79.8 kg (176 lb)   SpO2 97%   BMI 36.78 kg/m²     Physical Exam:  General Appearance: alert, appears stated age, and cooperative  Lungs: clear to auscultation, respirations regular, respirations even, and respirations unlabored  Heart: regular rhythm & normal rate, normal S1, S2, no murmur, no gallop, no rub, and no click  Abdomen: normal bowel sounds, no masses, no hepatomegaly, no splenomegaly, soft non-tender, no guarding, and no rebound tenderness    Discharge Disposition  Home or Self " Care    Discharge Medications     Discharge Medications        New Medications        Instructions Start Date   traMADol 50 MG tablet  Commonly known as: Ultram   50 mg, Oral, Every 8 Hours PRN             Continue These Medications        Instructions Start Date   cloNIDine 0.1 MG tablet  Commonly known as: CATAPRES   0.1 mg, Oral, Daily PRN      gabapentin 100 MG capsule  Commonly known as: NEURONTIN   100 mg, Oral, 3 Times Daily      hydroCHLOROthiazide 50 MG tablet   50 mg, Oral, Daily      lisinopril 40 MG tablet  Commonly known as: PRINIVIL,ZESTRIL   40 mg, Oral, Daily      loratadine 10 MG tablet  Commonly known as: CLARITIN   10 mg, Oral, Daily      metoprolol succinate  MG 24 hr tablet  Commonly known as: TOPROL-XL   100 mg, Oral, Daily      multivitamin with minerals tablet tablet   1 tablet, Oral, Daily      NON FORMULARY   2 tablets, Oral, Daily, ANCIENT NUTRITION COLLAGEN SUPPLEMENT       nystatin [id number] UNIT/GM powder  Commonly known as: MYCOSTATIN   Apply 1 Application topically to the appropriate area as directed 2 (Two) Times a Day As Needed (yeast infection).      omeprazole 20 MG capsule  Commonly known as: priLOSEC   20 mg, Oral, Daily      simvastatin 20 MG tablet  Commonly known as: ZOCOR   20 mg, Oral, Nightly      spironolactone 50 MG tablet  Commonly known as: ALDACTONE   50 mg, Oral, Daily               Discharge Diet: Low fiber diet.    Activity at Discharge: Weight restrictions per general surgeons recommendations no greater than 10 to 15 pounds for at least 4 weeks.    Follow-up Appointments: See below    Future Appointments   Date Time Provider Department Center   8/13/2024  2:00 PM Gary Davis MD [facility] PAD   8/26/2024 11:00 AM PAD US [facility] 3 BH PAD [facility] PAD   8/28/2024  1:45 PM Viktoria Rizzo APRN [facility] PAD PAD   10/2/2024 11:00 AM Mukund Franklin DO [facility] PAD PAD   10/8/2024 10:00 AM Kandis Snider APRN [facility] CD  PAD     Additional Instructions  for the Follow-ups that You Need to Schedule       CBC Auto Differential    Aug 14, 2024 (Approximate)      Release to patient: Routine Release                Test Results Pending at Discharge: CBC       Pablo Walsh MD  08/07/24  11:00 CDT            Electronically signed by Pablo Walsh MD at 08/07/24 1105       Discharge Order (From admission, onward)       Start     Ordered    08/07/24 1057  Discharge patient  Once        Comments: F/u with PCP in 1 week for follow up leukocytosis.   Expected Discharge Date: 08/07/24   Discharge Disposition: Home or Self Care   Physician of Record for Attribution - Please select from Treatment Team: PABLO WALSH [[id number]]   Review needed by CMO to determine Physician of Record: No      Question Answer Comment   Physician of Record for Attribution - Please select from Treatment Team PABLO WALSH    Review needed by CMO to determine Physician of Record No        08/07/24 1050

## 2024-08-13 ENCOUNTER — OFFICE VISIT (OUTPATIENT)
Dept: SURGERY | Facility: CLINIC | Age: 74
End: 2024-08-13
Payer: MEDICARE

## 2024-08-13 VITALS
HEIGHT: 58 IN | WEIGHT: 176 LBS | SYSTOLIC BLOOD PRESSURE: 119 MMHG | BODY MASS INDEX: 36.94 KG/M2 | DIASTOLIC BLOOD PRESSURE: 77 MMHG | OXYGEN SATURATION: 97 % | HEART RATE: 65 BPM

## 2024-08-13 DIAGNOSIS — K80.51: Primary | ICD-10-CM

## 2024-08-13 DIAGNOSIS — Z88.6 ALLERGY TO PAIN MEDICATION: ICD-10-CM

## 2024-08-13 DIAGNOSIS — K80.50 CHOLEDOCHOLITHIASIS: ICD-10-CM

## 2024-08-13 PROCEDURE — 1160F RVW MEDS BY RX/DR IN RCRD: CPT | Performed by: SURGERY

## 2024-08-13 PROCEDURE — 1159F MED LIST DOCD IN RCRD: CPT | Performed by: SURGERY

## 2024-08-13 PROCEDURE — 99024 POSTOP FOLLOW-UP VISIT: CPT | Performed by: SURGERY

## 2024-08-13 PROCEDURE — 3074F SYST BP LT 130 MM HG: CPT | Performed by: SURGERY

## 2024-08-13 PROCEDURE — 3078F DIAST BP <80 MM HG: CPT | Performed by: SURGERY

## 2024-08-13 NOTE — PROGRESS NOTES
OFFICE FOLLOW UP VISIT    Referring Provider: No ref. provider found  Primary Care Provider: Charly Dominguez APRN    Chief Complaint   Patient presents with    Diverticulitis     And leukocytosis        Subjective .     3 weeks status post robotic cholecystectomy with intraoperative cholangiogram and ERCP. Overall doing well.  She did have an allergic reaction with a skin rash to tramadol.    History:  Past Medical History:   Diagnosis Date    Arthritis     Bleeding tendency     CHF (congestive heart failure)     Diabetes mellitus     GERD (gastroesophageal reflux disease)     Gout     Hearing loss     HEARING AIDS    Hyperlipidemia     Hypertension     Peripheral vascular disease     PONV (postoperative nausea and vomiting)     Sleep apnea     Yeast infection     PRONE TO YEAST   ,   Past Surgical History:   Procedure Laterality Date    BREAST BIOPSY Right     CHOLECYSTECTOMY N/A 7/26/2024    Procedure: LAPAROSCOPIC CHOLECYSTECTOMY INTRAOPERATIVE CHOLANGIOGRAM WITH [employer] ROB[employer];  Surgeon: Gary Davis MD;  Location: [facility] OR;  Service: Robotics - DaVinci;  Laterality: N/A;    ENDOSCOPY  07/2023    HYSTERECTOMY      Partial    TOTAL HIP ARTHROPLASTY Bilateral     VARICOSE VEIN SURGERY Right 11/14/2022    Procedure: RIGHT SAPHENOUS VEIN RADIO FREQUENCY ABLATION;  Surgeon: Serafin Sher DO;  Location: [facility] HYBRID OR 12;  Service: Vascular;  Laterality: Right;    VARICOSE VEIN SURGERY Left 01/16/2023    Procedure: LEFT SAPHENOUS VEIN RADIO FREQUENCY ABLATION;  Surgeon: Serafin Sher DO;  Location:  [facility] HYBRID OR 12;  Service: Vascular;  Laterality: Left;   ,   Family History   Problem Relation Age of Onset    Stroke Mother     Cancer Father     Breast cancer Neg Hx    ,   Social History     Tobacco Use    Smoking status: Never    Smokeless tobacco: Never   Vaping Use    Vaping status: Never Used   Substance Use Topics    Alcohol use: Never    Drug use: Never       Current Outpatient Medications:  "    cloNIDine (CATAPRES) 0.1 MG tablet, Take 1 tablet by mouth Daily As Needed for High Blood Pressure., Disp: 30 tablet, Rfl: 11    gabapentin (NEURONTIN) 100 MG capsule, Take 1 capsule by mouth 3 (Three) Times a Day for 30 days. (Patient taking differently: Take 1 capsule by mouth 3 (Three) Times a Day. Pt taking 1 capsule daily at night), Disp: 90 capsule, Rfl: 0    hydroCHLOROthiazide 50 MG tablet, Take 1 tablet by mouth Daily., Disp: , Rfl:     lisinopril (PRINIVIL,ZESTRIL) 40 MG tablet, Take 1 tablet by mouth Daily., Disp: , Rfl:     loratadine (CLARITIN) 10 MG tablet, Take 1 tablet by mouth Daily., Disp: , Rfl:     metoprolol succinate XL (TOPROL-XL) 100 MG 24 hr tablet, Take 1 tablet by mouth Daily., Disp: , Rfl:     multivitamin with minerals (PRESERVISION AREDS PO), Take 1 tablet by mouth Daily., Disp: , Rfl:     NON FORMULARY, Take 2 tablets by mouth Daily. ANCIENT NUTRITION COLLAGEN SUPPLEMENT, Disp: , Rfl:     nystatin (MYCOSTATIN) 740131 UNIT/GM powder, Apply 1 Application topically to the appropriate area as directed 2 (Two) Times a Day As Needed (yeast infection)., Disp: , Rfl:     omeprazole (priLOSEC) 20 MG capsule, Take 1 capsule by mouth Daily., Disp: , Rfl:     simvastatin (ZOCOR) 20 MG tablet, Take 1 tablet by mouth Every Night., Disp: , Rfl:     spironolactone (ALDACTONE) 50 MG tablet, Take 1 tablet by mouth Daily., Disp: , Rfl:     Allergies:  Tramadol, Entresto [sacubitril-valsartan], Farxiga [dapagliflozin], Jardiance [empagliflozin], Latex, and Silicone      The following portions of the patient's history were reviewed and updated as appropriate: allergies, current medications, past family history, past medical history, past social history, past surgical history, and problem list.      Review of Systems  A comprehensive 14 point review of systems was performed and is negative unless otherwise noted      Objective   /77   Pulse 65   Ht 147.3 cm (58\")   Wt 79.8 kg (176 lb)   SpO2 " 97%   BMI 36.78 kg/m²     BMI followup discussion/instruction with patient: continue with current weight loss program, educational material, exercise counseling, and Information on healthy weight added to patient's after visit summary.      Physical Exam  Appearing elderly female.  Abdomen is flat, incisions are well-healed      Labs:  I personally reviewed all pertinent labs.   Imaging: I personally reviewed all pertinent imaging studies.   Pathology: Chronic calculus cholecystitis    Assessment & Plan   Diagnoses and all orders for this visit:    1. Calculus of bile duct with obstruction and without cholangitis or cholecystitis (Primary)    2. Choledocholithiasis      Allergy list has been updated to include tramadol.  Pathology was benign.  The patient is doing well.  Her back pain has resolved.  Appetite is good and having normal bowel movements.  She is scheduled to see Dr. Franklin at [facility] for a repeat ERCP and stent removal in October.  Since she is doing so well, follow-up as needed.       Gary Davis MD, FACS  General Surgery  8/13/2024  14:16 CDT    Please note that portions of this note were completed with a voice recognition program.

## 2024-08-20 ENCOUNTER — READMISSION MANAGEMENT (OUTPATIENT)
Dept: CALL CENTER | Facility: HOSPITAL | Age: 74
End: 2024-08-20
Payer: MEDICARE

## 2024-08-20 NOTE — OUTREACH NOTE
Medical Week 2 Survey      Flowsheet Row Responses   [facility] patient discharged from? [facility]   Does the patient have one of the following disease processes/diagnoses(primary or secondary)? Other   Week 2 attempt successful? Yes   Call start time 1128   Call end time 1130   Meds reviewed with patient/caregiver? Yes   Is the patient taking all medications as directed (includes completed medication regime)? Yes   Comments regarding appointments Pt has had pcp f/u.   Does the patient have a primary care provider?  Yes   Has the patient kept scheduled appointments due by today? Yes   What is the patient's perception of their health status since discharge? Improving   Week 2 Call Completed? Yes   Graduated Yes   Graduated/Revoked comments Pt reports improving. Pt has had pcp f/u. No changes to medications.   Call end time 1130            Kaylah MANZO - Registered Nurse

## 2024-08-26 ENCOUNTER — HOSPITAL ENCOUNTER (OUTPATIENT)
Dept: ULTRASOUND IMAGING | Facility: HOSPITAL | Age: 74
Discharge: HOME | End: 2024-08-26
Admitting: NURSE PRACTITIONER
Payer: MEDICARE

## 2024-08-26 DIAGNOSIS — I65.23 BILATERAL CAROTID ARTERY STENOSIS: ICD-10-CM

## 2024-08-26 PROCEDURE — 93880 EXTRACRANIAL BILAT STUDY: CPT | Performed by: SURGERY

## 2024-08-26 PROCEDURE — 93880 EXTRACRANIAL BILAT STUDY: CPT

## 2024-08-27 ENCOUNTER — TELEPHONE (OUTPATIENT)
Dept: VASCULAR SURGERY | Facility: CLINIC | Age: 74
End: 2024-08-27
Payer: MEDICARE

## 2024-08-28 ENCOUNTER — OFFICE VISIT (OUTPATIENT)
Dept: VASCULAR SURGERY | Facility: CLINIC | Age: 74
End: 2024-08-28
Payer: MEDICARE

## 2024-08-28 VITALS
DIASTOLIC BLOOD PRESSURE: 78 MMHG | OXYGEN SATURATION: 97 % | HEART RATE: 56 BPM | WEIGHT: 165 LBS | SYSTOLIC BLOOD PRESSURE: 128 MMHG | BODY MASS INDEX: 34.63 KG/M2 | HEIGHT: 58 IN

## 2024-08-28 DIAGNOSIS — I87.2 VENOUS INSUFFICIENCY: ICD-10-CM

## 2024-08-28 DIAGNOSIS — E78.2 MIXED HYPERLIPIDEMIA: ICD-10-CM

## 2024-08-28 DIAGNOSIS — I10 ESSENTIAL HYPERTENSION: ICD-10-CM

## 2024-08-28 DIAGNOSIS — M50.30 DDD (DEGENERATIVE DISC DISEASE), CERVICAL: ICD-10-CM

## 2024-08-28 DIAGNOSIS — I65.23 BILATERAL CAROTID ARTERY STENOSIS: Primary | ICD-10-CM

## 2024-08-28 PROCEDURE — 1160F RVW MEDS BY RX/DR IN RCRD: CPT | Performed by: NURSE PRACTITIONER

## 2024-08-28 PROCEDURE — 1159F MED LIST DOCD IN RCRD: CPT | Performed by: NURSE PRACTITIONER

## 2024-08-28 PROCEDURE — 3074F SYST BP LT 130 MM HG: CPT | Performed by: NURSE PRACTITIONER

## 2024-08-28 PROCEDURE — 3078F DIAST BP <80 MM HG: CPT | Performed by: NURSE PRACTITIONER

## 2024-08-28 PROCEDURE — 99214 OFFICE O/P EST MOD 30 MIN: CPT | Performed by: NURSE PRACTITIONER

## 2024-08-28 RX ORDER — INSULIN GLARGINE 100 [IU]/ML
3 INJECTION, SOLUTION SUBCUTANEOUS DAILY
COMMUNITY

## 2024-10-11 DIAGNOSIS — M50.30 DDD (DEGENERATIVE DISC DISEASE), CERVICAL: ICD-10-CM

## 2024-10-11 RX ORDER — GABAPENTIN 100 MG/1
100 CAPSULE ORAL 3 TIMES DAILY
Qty: 90 CAPSULE | Refills: 0 | Status: SHIPPED | OUTPATIENT
Start: 2024-10-11 | End: 2024-11-10

## 2024-10-14 NOTE — PROGRESS NOTES
Chief Complaint  Congestive Heart Failure (6mo F/U) and Hypertension     Subjective          Eshajon Boogie presents to [facility] for routine follow-up.  She has chronic diastolic congestive heart failure, hypertension, hyperlipidemia, obstructive sleep apnea, GERD, venous insufficiency and obesity. She complains of chronic bilateral lower extremity edema, however she reports this has improved. She reports resolution of palpitations. Patient denies chest pain, shortness of breath, dizziness, syncope, orthopnea, PND or decreased stamina.  Patient denies any signs of bleeding.    Hypertension  This is a chronic problem. The current episode started more than 1 year ago. The problem is controlled. Associated symptoms include palpitations and peripheral edema. Pertinent negatives include no anxiety, blurred vision, chest pain, headaches, malaise/fatigue, neck pain, orthopnea, PND, shortness of breath or sweats. Risk factors for coronary artery disease include obesity, dyslipidemia and post-menopausal state. Current antihypertension treatment includes central alpha agonists, ACE inhibitors, beta blockers and diuretics. The current treatment provides significant improvement. Hypertensive end-organ damage includes heart failure. Identifiable causes of hypertension include sleep apnea.   Hyperlipidemia  This is a chronic problem. The current episode started more than 1 year ago. The problem is controlled. Exacerbating diseases include obesity. Pertinent negatives include no chest pain or shortness of breath. Current antihyperlipidemic treatment includes statins. Risk factors for coronary artery disease include hypertension, obesity, post-menopausal and dyslipidemia.   Congestive Heart Failure  Presents for follow-up visit. Associated symptoms include edema and palpitations. Pertinent negatives include no abdominal pain, chest pain, chest pressure, claudication, fatigue, muscle weakness,  "near-syncope, nocturia, orthopnea, paroxysmal nocturnal dyspnea, shortness of breath or unexpected weight change. The symptoms have been stable. Compliance with total regimen is 51-75%. Compliance with diet is 51-75%. Compliance with exercise is 51-75%. Compliance with medications is %.     I have reviewed and confirmed the accuracy of the ROS  CASSIDY Rodriguez      Objective     Current Outpatient Medications:     cloNIDine (CATAPRES) 0.1 MG tablet, Take 1 tablet by mouth Daily As Needed for High Blood Pressure., Disp: 30 tablet, Rfl: 11    gabapentin (NEURONTIN) 100 MG capsule, Take 1 capsule by mouth 3 (Three) Times a Day for 30 days., Disp: 90 capsule, Rfl: 0    hydroCHLOROthiazide 50 MG tablet, Take 1 tablet by mouth Daily., Disp: , Rfl:     Lantus SoloStar 100 UNIT/ML injection pen, Inject 300 Units under the skin into the appropriate area as directed Daily., Disp: , Rfl:     lisinopril (PRINIVIL,ZESTRIL) 40 MG tablet, Take 1 tablet by mouth Daily., Disp: , Rfl:     loratadine (CLARITIN) 10 MG tablet, Take 1 tablet by mouth Daily., Disp: , Rfl:     metoprolol succinate XL (TOPROL-XL) 100 MG 24 hr tablet, Take 1 tablet by mouth Daily., Disp: , Rfl:     multivitamin with minerals (PRESERVISION AREDS PO), Take 1 tablet by mouth Daily., Disp: , Rfl:     nystatin (MYCOSTATIN) 001116 UNIT/GM powder, Apply 1 Application topically to the appropriate area as directed 2 (Two) Times a Day As Needed (yeast infection)., Disp: , Rfl:     omeprazole (priLOSEC) 20 MG capsule, Take 1 capsule by mouth Daily., Disp: , Rfl:     simvastatin (ZOCOR) 20 MG tablet, Take 1 tablet by mouth Every Night., Disp: , Rfl:     spironolactone (ALDACTONE) 50 MG tablet, Take 1 tablet by mouth Daily., Disp: , Rfl:   Vital Signs:   /72   Pulse 65   Ht 147.3 cm (58\")   Wt 74.8 kg (165 lb)   SpO2 98%   BMI 34.49 kg/m²     Vitals and nursing note reviewed.   Constitutional:       General: Not in acute distress.     Appearance: " Normal and healthy appearance. Well-developed and not in distress. Obese. Not diaphoretic.   Eyes:      General: Lids are normal.         Right eye: No discharge.         Left eye: No discharge.      Conjunctiva/sclera: Conjunctivae normal.      Pupils: Pupils are equal, round, and reactive to light.   HENT:      Head: Normocephalic and atraumatic.      Jaw: There is normal jaw occlusion.      Right Ear: External ear normal.      Left Ear: External ear normal.      Nose: Nose normal.   Neck:      Thyroid: No thyromegaly.      Vascular: No carotid bruit, JVD or JVR. JVD normal.      Trachea: Trachea normal. No tracheal deviation.   Pulmonary:      Effort: Pulmonary effort is normal. No respiratory distress.      Breath sounds: Normal breath sounds. No decreased breath sounds. No wheezing. No rhonchi. No rales.   Chest:      Chest wall: Not tender to palpatation.   Cardiovascular:      PMI at left midclavicular line. Normal rate. Regular rhythm. Normal S1. Normal S2.       Murmurs: There is no murmur.      No gallop.  No click. No rub.   Pulses:     Intact distal pulses. No decreased pulses.   Edema:     Peripheral edema present.     Pretibial: bilateral trace edema of the pretibial area.     Ankle: bilateral trace edema of the ankle.     Feet: bilateral trace edema of the feet.  Abdominal:      General: Bowel sounds are normal. There is no distension.      Palpations: Abdomen is soft.      Tenderness: There is no abdominal tenderness.   Musculoskeletal: Normal range of motion.         General: No tenderness or deformity.      Cervical back: Normal range of motion and neck supple. Skin:     General: Skin is warm and dry.      Coloration: Skin is not pale.      Findings: No erythema or rash.   Neurological:      General: No focal deficit present.      Mental Status: Alert, oriented to person, place, and time and oriented to person, place and time.   Psychiatric:         Attention and Perception: Attention and  perception normal.         Mood and Affect: Mood and affect normal.         Speech: Speech normal.         Behavior: Behavior normal.         Thought Content: Thought content normal.         Cognition and Memory: Cognition and memory normal.         Judgment: Judgment normal.        Result Review :   The following data was reviewed by: CASSIDY Rodriguez on 10/15/2024:  Common labs          8/5/2024    04:24 8/5/2024    09:33 8/6/2024    05:12 8/7/2024    08:44   Common Labs   Glucose 125  212  116     BUN 8  7  6     Creatinine 0.61  0.69  0.60     Sodium 137  136  138     Potassium 3.7  4.0  3.7     Chloride 100  100  103     Calcium 9.5  9.1  9.1     Albumin  3.0  3.2     Total Bilirubin  0.3  0.2     Alkaline Phosphatase  71  75     AST (SGOT)  15  13     ALT (SGPT)  16  15     WBC  21.37  22.14  17.02    Hemoglobin  10.6  10.9  10.7    Hematocrit  32.9  33.6  33.0    Platelets  319  356  346    Radiology exam- bilateral lower extremity venous ultrasound 7/11/22 and cardiology exam- 2d echo 9/13/22         Assessment and Plan    Diagnoses and all orders for this visit:    1. Essential hypertension (Primary)-blood pressure is well controlled. Continue spironolactone, HCTZ, lisinopril, clonidine and metoprolol succinate.  Monitor and record daily blood pressure. Report readings consistently higher than 130/80 or consistently lower than 100/60.     2. Mixed hyperlipidemia-management per PCP.  Continue simvastatin.    3. KATY treated with BiPAP-patient reports compliance with BiPAP.  Stable.    4. Class 1 obesity due to excess calories with serious comorbidity and body mass index (BMI) of 34.0 to 34.9 in adult- BMI is >= 30 and <35. (Class 1 Obesity). The following options were offered after discussion;: weight loss educational material (shared in after visit summary).      5.  Venous insufficiency- present on bilateral lower extremity venous ultrasound 7/11/2022.  Pt is following with Dr. Serafin Sher with  vascular surgery.  Status post right lower extremity venous ablation on 11/14/2022. Left lower extremity 1/16/2023.     6. Chronic diastolic congestive heart failure (HCC)- NYHA class II.  Stage C.  Compensated.  Patient was unable to tolerate Entresto due to reported skin irritation and pruritus. Jardiance discontinued due to rash, candidiasis and pruritis. Reviewed signs and symptoms of CHF and what to report with the patient. Patient instructed to restrict sodium and weigh daily. Report weight gain of greater than 2 lbs overnight or 5 lbs in 1 week. Pt verbalized understanding of instructions and plan of care.  Continue spironolactone, lisinopril and metoprolol succinate.     Follow Up   Return in about 6 months (around 4/15/2025) for Next scheduled follow up.  Patient was given instructions and counseling regarding her condition or for health maintenance advice. Please see specific information pulled into the AVS if appropriate.

## 2024-10-15 ENCOUNTER — OFFICE VISIT (OUTPATIENT)
Dept: CARDIOLOGY | Facility: CLINIC | Age: 74
End: 2024-10-15
Payer: MEDICARE

## 2024-10-15 VITALS
SYSTOLIC BLOOD PRESSURE: 118 MMHG | BODY MASS INDEX: 34.63 KG/M2 | OXYGEN SATURATION: 98 % | WEIGHT: 165 LBS | HEART RATE: 65 BPM | DIASTOLIC BLOOD PRESSURE: 72 MMHG | HEIGHT: 58 IN

## 2024-10-15 DIAGNOSIS — I10 ESSENTIAL HYPERTENSION: Primary | ICD-10-CM

## 2024-10-15 DIAGNOSIS — E66.811 CLASS 1 OBESITY DUE TO EXCESS CALORIES WITH SERIOUS COMORBIDITY AND BODY MASS INDEX (BMI) OF 34.0 TO 34.9 IN ADULT: ICD-10-CM

## 2024-10-15 DIAGNOSIS — I87.2 VENOUS INSUFFICIENCY: ICD-10-CM

## 2024-10-15 DIAGNOSIS — E78.2 MIXED HYPERLIPIDEMIA: ICD-10-CM

## 2024-10-15 DIAGNOSIS — I50.32 CHRONIC DIASTOLIC CONGESTIVE HEART FAILURE: ICD-10-CM

## 2024-10-15 DIAGNOSIS — G47.33 OSA TREATED WITH BIPAP: ICD-10-CM

## 2024-10-15 DIAGNOSIS — E66.09 CLASS 1 OBESITY DUE TO EXCESS CALORIES WITH SERIOUS COMORBIDITY AND BODY MASS INDEX (BMI) OF 34.0 TO 34.9 IN ADULT: ICD-10-CM

## 2024-10-24 ENCOUNTER — TELEPHONE (OUTPATIENT)
Dept: GASTROENTEROLOGY | Age: 74
End: 2024-10-24

## 2024-10-24 NOTE — TELEPHONE ENCOUNTER
Called patient to remind them of their procedure with Dr. Trent Bañuelos  at [facility]  on 10/29/24  to arrive at 1130     CONFIRMED  PATIENT  is aware that their  will need to remain during procedure

## 2024-10-29 ENCOUNTER — ANESTHESIA EVENT (OUTPATIENT)
Dept: ENDOSCOPY | Age: 74
End: 2024-10-29
Payer: MEDICARE

## 2024-10-29 ENCOUNTER — HOSPITAL ENCOUNTER (OUTPATIENT)
Age: 74
Setting detail: OUTPATIENT SURGERY
Discharge: HOME | End: 2024-10-29
Attending: INTERNAL MEDICINE | Admitting: INTERNAL MEDICINE
Payer: MEDICARE

## 2024-10-29 ENCOUNTER — ANCILLARY PROCEDURE (OUTPATIENT)
Dept: ENDOSCOPY | Age: 74
End: 2024-10-29
Attending: INTERNAL MEDICINE
Payer: MEDICARE

## 2024-10-29 ENCOUNTER — ANESTHESIA (OUTPATIENT)
Dept: ENDOSCOPY | Age: 74
End: 2024-10-29
Payer: MEDICARE

## 2024-10-29 ENCOUNTER — APPOINTMENT (OUTPATIENT)
Dept: GENERAL RADIOLOGY | Age: 74
End: 2024-10-29
Attending: INTERNAL MEDICINE
Payer: MEDICARE

## 2024-10-29 VITALS
OXYGEN SATURATION: 100 % | BODY MASS INDEX: 34.63 KG/M2 | SYSTOLIC BLOOD PRESSURE: 121 MMHG | RESPIRATION RATE: 18 BRPM | DIASTOLIC BLOOD PRESSURE: 61 MMHG | WEIGHT: 165 LBS | TEMPERATURE: 98.7 F | HEART RATE: 54 BPM | HEIGHT: 58 IN

## 2024-10-29 PROCEDURE — 74328 X-RAY BILE DUCT ENDOSCOPY: CPT | Performed by: INTERNAL MEDICINE

## 2024-10-29 PROCEDURE — 2580000003 HC RX 258: Performed by: INTERNAL MEDICINE

## 2024-10-29 PROCEDURE — 43275 ERCP REMOVE FORGN BODY DUCT: CPT | Performed by: INTERNAL MEDICINE

## 2024-10-29 PROCEDURE — 3609015000 HC ERCP REMOVE FOREIGN BODY/STENT BILIARY/PANC DUCT: Performed by: INTERNAL MEDICINE

## 2024-10-29 PROCEDURE — 2720000010 HC SURG SUPPLY STERILE: Performed by: INTERNAL MEDICINE

## 2024-10-29 PROCEDURE — 2709999900 HC NON-CHARGEABLE SUPPLY: Performed by: INTERNAL MEDICINE

## 2024-10-29 PROCEDURE — 3700000000 HC ANESTHESIA ATTENDED CARE: Performed by: INTERNAL MEDICINE

## 2024-10-29 PROCEDURE — 6360000002 HC RX W HCPCS: Performed by: NURSE ANESTHETIST, CERTIFIED REGISTERED

## 2024-10-29 PROCEDURE — 6370000000 HC RX 637 (ALT 250 FOR IP): Performed by: INTERNAL MEDICINE

## 2024-10-29 PROCEDURE — 7100000011 HC PHASE II RECOVERY - ADDTL 15 MIN: Performed by: INTERNAL MEDICINE

## 2024-10-29 PROCEDURE — 7100000010 HC PHASE II RECOVERY - FIRST 15 MIN: Performed by: INTERNAL MEDICINE

## 2024-10-29 PROCEDURE — C1769 GUIDE WIRE: HCPCS | Performed by: INTERNAL MEDICINE

## 2024-10-29 PROCEDURE — 74328 X-RAY BILE DUCT ENDOSCOPY: CPT

## 2024-10-29 PROCEDURE — 43264 ERCP REMOVE DUCT CALCULI: CPT | Performed by: INTERNAL MEDICINE

## 2024-10-29 PROCEDURE — 2500000003 HC RX 250 WO HCPCS: Performed by: NURSE ANESTHETIST, CERTIFIED REGISTERED

## 2024-10-29 PROCEDURE — 3700000001 HC ADD 15 MINUTES (ANESTHESIA): Performed by: INTERNAL MEDICINE

## 2024-10-29 RX ORDER — PROPOFOL 10 MG/ML
INJECTION, EMULSION INTRAVENOUS
Status: DISCONTINUED | OUTPATIENT
Start: 2024-10-29 | End: 2024-10-29 | Stop reason: SDUPTHER

## 2024-10-29 RX ORDER — INSULIN GLARGINE 100 [IU]/ML
8 INJECTION, SOLUTION SUBCUTANEOUS NIGHTLY
COMMUNITY

## 2024-10-29 RX ORDER — INDOMETHACIN 100 MG
SUPPOSITORY, RECTAL RECTAL PRN
Status: DISCONTINUED | OUTPATIENT
Start: 2024-10-29 | End: 2024-10-29 | Stop reason: HOSPADM

## 2024-10-29 RX ORDER — INDOMETHACIN 100 MG
100 SUPPOSITORY, RECTAL RECTAL ONCE
Status: DISCONTINUED | OUTPATIENT
Start: 2024-10-29 | End: 2024-10-29 | Stop reason: HOSPADM

## 2024-10-29 RX ORDER — EPHEDRINE SULFATE 50 MG/ML
INJECTION INTRAVENOUS
Status: DISCONTINUED | OUTPATIENT
Start: 2024-10-29 | End: 2024-10-29 | Stop reason: SDUPTHER

## 2024-10-29 RX ORDER — ONDANSETRON 2 MG/ML
INJECTION INTRAMUSCULAR; INTRAVENOUS
Status: DISCONTINUED | OUTPATIENT
Start: 2024-10-29 | End: 2024-10-29 | Stop reason: SDUPTHER

## 2024-10-29 RX ORDER — FENTANYL CITRATE 50 UG/ML
INJECTION, SOLUTION INTRAMUSCULAR; INTRAVENOUS
Status: DISCONTINUED | OUTPATIENT
Start: 2024-10-29 | End: 2024-10-29 | Stop reason: SDUPTHER

## 2024-10-29 RX ORDER — SODIUM CHLORIDE, SODIUM LACTATE, POTASSIUM CHLORIDE, CALCIUM CHLORIDE 600; 310; 30; 20 MG/100ML; MG/100ML; MG/100ML; MG/100ML
INJECTION, SOLUTION INTRAVENOUS CONTINUOUS
Status: DISCONTINUED | OUTPATIENT
Start: 2024-10-29 | End: 2024-10-29 | Stop reason: HOSPADM

## 2024-10-29 RX ADMIN — FENTANYL CITRATE 50 MCG: 50 INJECTION INTRAMUSCULAR; INTRAVENOUS at 10:30

## 2024-10-29 RX ADMIN — PROPOFOL 150 MCG/KG/MIN: 10 INJECTION, EMULSION INTRAVENOUS at 10:31

## 2024-10-29 RX ADMIN — ONDANSETRON 4 MG: 2 INJECTION INTRAMUSCULAR; INTRAVENOUS at 10:47

## 2024-10-29 RX ADMIN — PROPOFOL 80 MG: 10 INJECTION, EMULSION INTRAVENOUS at 10:30

## 2024-10-29 RX ADMIN — SODIUM CHLORIDE, SODIUM LACTATE, POTASSIUM CHLORIDE, AND CALCIUM CHLORIDE: 600; 310; 30; 20 INJECTION, SOLUTION INTRAVENOUS at 10:21

## 2024-10-29 RX ADMIN — EPHEDRINE SULFATE 10 MG: 50 INJECTION INTRAVENOUS at 10:33

## 2024-10-29 ASSESSMENT — PAIN - FUNCTIONAL ASSESSMENT
PAIN_FUNCTIONAL_ASSESSMENT: NONE - DENIES PAIN
PAIN_FUNCTIONAL_ASSESSMENT: NONE - DENIES PAIN
PAIN_FUNCTIONAL_ASSESSMENT: 0-10

## 2024-10-29 NOTE — ANESTHESIA POSTPROCEDURE EVALUATION
Department of Anesthesiology  Postprocedure Note    Patient: Elmira Lake  MRN: [id number]  YOB: 1950  Date of evaluation: 10/29/2024    Procedure Summary       Date: 10/29/24 Room / Location: [place] [address] / [facility]    Anesthesia Start: 1020 Anesthesia Stop: 1052    Procedure: ENDOSCOPIC RETROGRADE CHOLANGIOPANCREATOGRAPHY STENT REMOVAL (Abdomen) Diagnosis:       Encounter for removal of biliary stent      (Encounter for removal of biliary stent [Z46.89])    Surgeons: Trent Bañuelos MD Responsible Provider: Ronaldo Liu APRN - CRNA    Anesthesia Type: general, TIVA ASA Status: 3            Anesthesia Type: No value filed.    Carolyn Phase I: Carolyn Score: 10    Carolyn Phase II:      Anesthesia Post Evaluation    Patient location during evaluation: bedside  Patient participation: complete - patient cannot participate  Level of consciousness: responsive to verbal stimuli  Pain score: 0  Airway patency: patent  Nausea & Vomiting: no nausea and no vomiting  Cardiovascular status: hemodynamically stable  Respiratory status: acceptable, spontaneous ventilation and room air  Hydration status: euvolemic    No notable events documented.

## 2024-10-29 NOTE — OP NOTE
Endoscopic Procedure Note    Patient: Elmira Lake : 1950  [facility] Rec#: [id number] Acc#: [id number]     Primary Care Provider Zafar Cross APRN - NP  Referring Provider:     Endoscopist: Trent Bañuelos MD    Date of Procedure:  10/29/2024     Procedure:   1. ERCP with stent removal  2. ERCP with biliary sludge removal  3. Intra procedure interpretation of biliary fluoroscopy [id number]    Indications:   1. Previous ERCP with stone/sludge removal  2. Previous ERCP stent placement    Anesthesia:  General     Estimated Blood Loss: minimal    Procedure:   Prior to the procedure the patient's chart was reviewed and informed consent was obtained.  Risk and Benefits (Risks including but not limited to bleeding, perforation, infection, 8 to 10% risk of post ERCP pancreatitis, and even death) were discussed with the patient. They were agreeable to continue.     Patient was brought to the operating room, underwent general anesthesia, and placed in the prone position.  A side-viewing duodenoscope was advanced from the oropharynx down to the distal duodenum and reduced into a short position opposite the ampulla. The ampulla appeared c/w previous stent placement and sphincterotomy. A  film was obtained which showed stent in proper position.     Stent removal:  An existing stent was seen emanating from the ampulla. This was grasped and removed with a polypectomy snare and a rat-toothed forceps and removed in its entirety through the scope.     The bile duct was then cannulated using a 0.035 x 260 cm straight Dreamwire. A short nose traction sphincterotome was advanced over the wire and the bile duct was deeply cannulated.  Contrast was injected.  I personally interpreted the bile duct images.  The flow of contrast was adequate.  Contrast injection showed filling defects in the distal CBD. The pancreatic duct was not cannulated.       The bile duct was swept multiple times starting the bifurcation with a 12mm

## 2024-10-29 NOTE — DISCHARGE INSTRUCTIONS
consciousness).     Your stools are maroon or very bloody.     You have trouble breathing.   Call your doctor now or go to the emergency room if:    You have new or worse belly pain.     You have pain that does not get better after you take pain medicine.     You have a fever.     You cannot pass stools or gas.     You are sick to your stomach or cannot hold down fluids.     You have blood in your stools.   Watch closely for changes in your health, and be sure to contact your doctor if:    Your throat still hurts after a day or two.     You do not get better as expected.

## 2024-10-29 NOTE — ANESTHESIA PRE PROCEDURE
Department of Anesthesiology  Preprocedure Note       Name:  Elmira Lake   Age:  74 y.o.  :  1950                                          MRN:  [id number]         Date:  10/29/2024      Surgeon: Surgeon(s):  Trent Bañuelos MD    Procedure: Procedure(s):  ENDOSCOPIC RETROGRADE CHOLANGIOPANCREATOGRAPHY STENT REMOVAL    Medications prior to admission:   Prior to Admission medications    Medication Sig Start Date End Date Taking? Authorizing Provider   insulin glargine (LANTUS) 100 UNIT/ML injection vial Inject 8 Units into the skin nightly   Yes Shailesh Contreras MD   omeprazole (PRILOSEC) 20 MG delayed release capsule Take 1 capsule by mouth daily   Yes Shailesh Contreras MD   metoprolol succinate (TOPROL XL) 100 MG extended release tablet Take 1 tablet by mouth daily   Yes Shailesh Contreras MD   Multiple Vitamins-Minerals (PRESERVISION AREDS 2 PO) Take 1 tablet by mouth daily   Yes Shailesh Contreras MD   gabapentin (NEURONTIN) 300 MG capsule Take 100 mg by mouth 3 times daily.   Yes Shailesh Contreras MD   lisinopril (PRINIVIL;ZESTRIL) 40 MG tablet Take 1 tablet by mouth daily   Yes ProviderShailesh MD   spironolactone (ALDACTONE) 50 MG tablet Take 1 tablet by mouth daily   Yes ProviderShailesh MD   hydrochlorothiazide (HYDRODIURIL) 25 MG tablet  3/19/19  Yes Shailesh Contreras MD   simvastatin (ZOCOR) 10 MG tablet  3/19/19  Yes Shailesh Contreras MD   loratadine (CLARITIN) 10 MG capsule Take 1 capsule by mouth daily   Yes Shailesh Contreras MD   nystatin (MYCOSTATIN) POWD powder Apply topically 2 times daily  Patient not taking: Reported on 10/29/2024    Shailesh Contreras MD   Tirzepatide (MOUNJARO) 5 MG/0.5ML SOPN SC injection Inject 0.5 mLs into the skin once a week  Patient not taking: Reported on 10/29/2024    Shailesh Contreras MD       Current medications:    Current Facility-Administered Medications   Medication Dose Route Frequency Provider Last Rate

## 2024-11-21 DIAGNOSIS — M50.30 DDD (DEGENERATIVE DISC DISEASE), CERVICAL: ICD-10-CM

## 2024-11-22 RX ORDER — GABAPENTIN 100 MG/1
100 CAPSULE ORAL 3 TIMES DAILY
Qty: 90 CAPSULE | Refills: 0 | Status: SHIPPED | OUTPATIENT
Start: 2024-11-22 | End: 2024-12-22

## 2025-01-08 ENCOUNTER — TELEPHONE (OUTPATIENT)
Dept: NEUROSURGERY | Facility: CLINIC | Age: 75
End: 2025-01-08
Payer: MEDICARE

## 2025-01-08 NOTE — TELEPHONE ENCOUNTER
Patient called wanting to start getting Gabapentin prescription 90 days at a time I called her back and told her she needs to start trying to get her refills for her PCP and she agreed to do so.

## 2025-02-23 ENCOUNTER — HOSPITAL ENCOUNTER (EMERGENCY)
Facility: HOSPITAL | Age: 75
Discharge: HOME | End: 2025-02-23
Attending: FAMILY MEDICINE | Admitting: FAMILY MEDICINE
Payer: MEDICARE

## 2025-02-23 ENCOUNTER — APPOINTMENT (OUTPATIENT)
Dept: CT IMAGING | Facility: HOSPITAL | Age: 75
End: 2025-02-23
Payer: MEDICARE

## 2025-02-23 VITALS
BODY MASS INDEX: 33.9 KG/M2 | SYSTOLIC BLOOD PRESSURE: 103 MMHG | OXYGEN SATURATION: 96 % | HEIGHT: 58 IN | WEIGHT: 161.5 LBS | TEMPERATURE: 98.1 F | HEART RATE: 73 BPM | RESPIRATION RATE: 16 BRPM | DIASTOLIC BLOOD PRESSURE: 65 MMHG

## 2025-02-23 DIAGNOSIS — K57.92 DIVERTICULITIS: Primary | ICD-10-CM

## 2025-02-23 LAB
ALBUMIN SERPL-MCNC: 4 G/DL (ref 3.5–5.2)
ALBUMIN/GLOB SERPL: 1.6 G/DL
ALP SERPL-CCNC: 75 U/L (ref 39–117)
ALT SERPL W P-5'-P-CCNC: 16 U/L (ref 1–33)
ANION GAP SERPL CALCULATED.3IONS-SCNC: 13 MMOL/L (ref 5–15)
AST SERPL-CCNC: 17 U/L (ref 1–32)
BASOPHILS # BLD AUTO: 0.04 10*3/MM3 (ref 0–0.2)
BASOPHILS NFR BLD AUTO: 0.3 % (ref 0–1.5)
BILIRUB SERPL-MCNC: 0.3 MG/DL (ref 0–1.2)
BUN SERPL-MCNC: 19 MG/DL (ref 8–23)
BUN/CREAT SERPL: 21.6 (ref 7–25)
CALCIUM SPEC-SCNC: 9.7 MG/DL (ref 8.6–10.5)
CHLORIDE SERPL-SCNC: 99 MMOL/L (ref 98–107)
CO2 SERPL-SCNC: 24 MMOL/L (ref 22–29)
CREAT SERPL-MCNC: 0.88 MG/DL (ref 0.57–1)
D-LACTATE SERPL-SCNC: 1.1 MMOL/L (ref 0.5–2)
DEPRECATED RDW RBC AUTO: 46 FL (ref 37–54)
EGFRCR SERPLBLD CKD-EPI 2021: 68.6 ML/MIN/1.73
EOSINOPHIL # BLD AUTO: 0.14 10*3/MM3 (ref 0–0.4)
EOSINOPHIL NFR BLD AUTO: 1 % (ref 0.3–6.2)
ERYTHROCYTE [DISTWIDTH] IN BLOOD BY AUTOMATED COUNT: 13.7 % (ref 12.3–15.4)
GLOBULIN UR ELPH-MCNC: 2.5 GM/DL
GLUCOSE SERPL-MCNC: 89 MG/DL (ref 65–99)
HCT VFR BLD AUTO: 39.2 % (ref 34–46.6)
HGB BLD-MCNC: 12.8 G/DL (ref 12–15.9)
IMM GRANULOCYTES # BLD AUTO: 0.1 10*3/MM3 (ref 0–0.05)
IMM GRANULOCYTES NFR BLD AUTO: 0.7 % (ref 0–0.5)
INR PPP: 0.96 (ref 0.91–1.09)
LIPASE SERPL-CCNC: 33 U/L (ref 13–60)
LYMPHOCYTES # BLD AUTO: 2.46 10*3/MM3 (ref 0.7–3.1)
LYMPHOCYTES NFR BLD AUTO: 18.2 % (ref 19.6–45.3)
MAGNESIUM SERPL-MCNC: 1.9 MG/DL (ref 1.6–2.4)
MCH RBC QN AUTO: 29.8 PG (ref 26.6–33)
MCHC RBC AUTO-ENTMCNC: 32.7 G/DL (ref 31.5–35.7)
MCV RBC AUTO: 91.4 FL (ref 79–97)
MONOCYTES # BLD AUTO: 1.14 10*3/MM3 (ref 0.1–0.9)
MONOCYTES NFR BLD AUTO: 8.5 % (ref 5–12)
NEUTROPHILS NFR BLD AUTO: 71.3 % (ref 42.7–76)
NEUTROPHILS NFR BLD AUTO: 9.6 10*3/MM3 (ref 1.7–7)
NRBC BLD AUTO-RTO: 0 /100 WBC (ref 0–0.2)
PLATELET # BLD AUTO: 285 10*3/MM3 (ref 140–450)
PMV BLD AUTO: 11 FL (ref 6–12)
POTASSIUM SERPL-SCNC: 4 MMOL/L (ref 3.5–5.2)
PROT SERPL-MCNC: 6.5 G/DL (ref 6–8.5)
PROTHROMBIN TIME: 13.3 SECONDS (ref 11.8–14.8)
RBC # BLD AUTO: 4.29 10*6/MM3 (ref 3.77–5.28)
SODIUM SERPL-SCNC: 136 MMOL/L (ref 136–145)
WBC NRBC COR # BLD AUTO: 13.48 10*3/MM3 (ref 3.4–10.8)

## 2025-02-23 PROCEDURE — 83735 ASSAY OF MAGNESIUM: CPT | Performed by: FAMILY MEDICINE

## 2025-02-23 PROCEDURE — 85025 COMPLETE CBC W/AUTO DIFF WBC: CPT | Performed by: FAMILY MEDICINE

## 2025-02-23 PROCEDURE — 25510000001 IOPAMIDOL 61 % SOLUTION: Performed by: FAMILY MEDICINE

## 2025-02-23 PROCEDURE — 74177 CT ABD & PELVIS W/CONTRAST: CPT

## 2025-02-23 PROCEDURE — 83605 ASSAY OF LACTIC ACID: CPT | Performed by: FAMILY MEDICINE

## 2025-02-23 PROCEDURE — 85610 PROTHROMBIN TIME: CPT | Performed by: FAMILY MEDICINE

## 2025-02-23 PROCEDURE — 83690 ASSAY OF LIPASE: CPT | Performed by: FAMILY MEDICINE

## 2025-02-23 PROCEDURE — 99285 EMERGENCY DEPT VISIT HI MDM: CPT

## 2025-02-23 PROCEDURE — 80053 COMPREHEN METABOLIC PANEL: CPT | Performed by: FAMILY MEDICINE

## 2025-02-23 RX ORDER — SODIUM CHLORIDE 0.9 % (FLUSH) 0.9 %
10 SYRINGE (ML) INJECTION AS NEEDED
Status: DISCONTINUED | OUTPATIENT
Start: 2025-02-23 | End: 2025-02-23 | Stop reason: HOSPADM

## 2025-02-23 RX ORDER — IOPAMIDOL 612 MG/ML
100 INJECTION, SOLUTION INTRAVASCULAR
Status: COMPLETED | OUTPATIENT
Start: 2025-02-23 | End: 2025-02-23

## 2025-02-23 RX ORDER — METRONIDAZOLE 500 MG/1
500 TABLET ORAL 3 TIMES DAILY
Qty: 21 TABLET | Refills: 0 | Status: SHIPPED | OUTPATIENT
Start: 2025-02-23 | End: 2025-03-02

## 2025-02-23 RX ORDER — CIPROFLOXACIN 500 MG/1
500 TABLET, FILM COATED ORAL 2 TIMES DAILY
Qty: 14 TABLET | Refills: 0 | Status: SHIPPED | OUTPATIENT
Start: 2025-02-23 | End: 2025-03-02

## 2025-02-23 RX ORDER — SODIUM CHLORIDE 0.9 % (FLUSH) 0.9 %
10 SYRINGE (ML) INJECTION AS NEEDED
Status: DISCONTINUED | OUTPATIENT
Start: 2025-02-23 | End: 2025-02-23

## 2025-02-23 RX ADMIN — IOPAMIDOL 100 ML: 612 INJECTION, SOLUTION INTRAVENOUS at 15:09

## 2025-02-23 NOTE — ED PROVIDER NOTES
Problem: Patient/Family Goals  Goal: Patient/Family Long Term Goal  Description: Patient's Long Term Goal: Discharge    Interventions:  - ADAT, Stable Hgb  - See additional Care Plan goals for specific interventions  Outcome: Progressing  Goal: Patient/F Subjective   History of Present Illness  74-year-old female presents emergency room.  She states that for the last few days.  She has been having severe abdominal pain.  She has been also taking some stool softeners because she has been on Jardiance she has been constipated.  She states that she is having this mucus-like substance coming with her stools.  She states sometimes she just has a bowel movement Wuertz mucus.  She has no fevers.  No vomiting.  Patient denies any other symptoms at this time.          Review of Systems   All other systems reviewed and are negative.      Past Medical History:   Diagnosis Date    Arthritis     Bleeding tendency     CHF (congestive heart failure)     Diabetes mellitus     Diverticulitis     GERD (gastroesophageal reflux disease)     Gout     Hearing loss     HEARING AIDS    Hyperlipidemia     Hypertension     Peripheral vascular disease     PONV (postoperative nausea and vomiting)     Sleep apnea     Yeast infection     PRONE TO YEAST       Allergies   Allergen Reactions    Tramadol Hives and Rash    Ciprofloxacin Rash    Entresto [Sacubitril-Valsartan] Itching and Rash     YEAST    Farxiga [Dapagliflozin] Itching     Itching and yeast infection     Jardiance [Empagliflozin] Itching    Latex Rash    Silicone Rash       Past Surgical History:   Procedure Laterality Date    BREAST BIOPSY Right     CHOLECYSTECTOMY N/A 07/26/2024    Procedure: LAPAROSCOPIC CHOLECYSTECTOMY INTRAOPERATIVE CHOLANGIOGRAM WITH LaserlikeI ROBOT;  Surgeon: Gary Davis MD;  Location: [facility] OR;  Service: Robotics - DaVinci;  Laterality: N/A;    ENDOSCOPY  07/2023    ERCP  07/29/2024    [facility]    EYE SURGERY Bilateral     catarac surgery    HYSTERECTOMY      Partial    TOTAL HIP ARTHROPLASTY Bilateral     VARICOSE VEIN SURGERY Right 11/14/2022    Procedure: RIGHT SAPHENOUS VEIN RADIO FREQUENCY ABLATION;  Surgeon: Serafin Sher DO;  Location: [facility] HYBRID OR 12;  Service: Vascular;  Laterality: Right;     VARICOSE VEIN SURGERY Left 01/16/2023    Procedure: LEFT SAPHENOUS VEIN RADIO FREQUENCY ABLATION;  Surgeon: Serafin Sher DO;  Location: [facility] OR ;  Service: Vascular;  Laterality: Left;       Family History   Problem Relation Age of Onset    Stroke Mother     Cancer Father     Breast cancer Neg Hx        Social History     Socioeconomic History    Marital status:    Tobacco Use    Smoking status: Never     Passive exposure: Never    Smokeless tobacco: Never   Vaping Use    Vaping status: Never Used   Substance and Sexual Activity    Alcohol use: Never    Drug use: Never    Sexual activity: Defer           Objective   Physical Exam  Vitals and nursing note reviewed.   Constitutional:       Appearance: She is well-developed.   HENT:      Head: Normocephalic and atraumatic.      Mouth/Throat:      Mouth: Mucous membranes are moist.   Cardiovascular:      Rate and Rhythm: Normal rate and regular rhythm.      Heart sounds: Normal heart sounds.   Pulmonary:      Effort: Pulmonary effort is normal.      Breath sounds: Normal breath sounds.   Abdominal:      General: Bowel sounds are normal.      Palpations: Abdomen is soft.      Tenderness: There is no abdominal tenderness.   Skin:     General: Skin is warm and dry.   Neurological:      General: No focal deficit present.      Mental Status: She is alert and oriented to person, place, and time.   Psychiatric:         Mood and Affect: Mood normal.         Behavior: Behavior normal.         Procedures           ED Course  ED Course as of 02/23/25 1541   Sun Feb 23, 2025   1539 Patient was offered admission.  Patient declined admission. [RP]      ED Course User Index  [RP] Jose Pearson MD                                                     Lab Results (last 24 hours)       Procedure Component Value Units Date/Time    CBC & Differential [[id number]]  (Abnormal) Collected: 02/23/25 1341    Specimen: Blood Updated: 02/23/25 1356    Narrative:      The  following orders were created for panel order CBC & Differential.  Procedure                               Abnormality         Status                     ---------                               -----------         ------                     CBC Auto Differential[[id number]]        Abnormal            Final result                 Please view results for these tests on the individual orders.    Comprehensive Metabolic Panel [[id number]] Collected: 02/23/25 1341    Specimen: Blood Updated: 02/23/25 1414     Glucose 89 mg/dL      BUN 19 mg/dL      Creatinine 0.88 mg/dL      Sodium 136 mmol/L      Potassium 4.0 mmol/L      Comment: Slight hemolysis detected by analyzer. Result may be falsely elevated.        Chloride 99 mmol/L      CO2 24.0 mmol/L      Calcium 9.7 mg/dL      Total Protein 6.5 g/dL      Albumin 4.0 g/dL      ALT (SGPT) 16 U/L      AST (SGOT) 17 U/L      Alkaline Phosphatase 75 U/L      Total Bilirubin 0.3 mg/dL      Globulin 2.5 gm/dL      A/G Ratio 1.6 g/dL      BUN/Creatinine Ratio 21.6     Anion Gap 13.0 mmol/L      eGFR 68.6 mL/min/1.73     Narrative:      GFR Categories in Chronic Kidney Disease (CKD)      GFR Category          GFR (mL/min/1.73)    Interpretation  G1                     90 or greater         Normal or high (1)  G2                      60-89                Mild decrease (1)  G3a                   45-59                Mild to moderate decrease  G3b                   30-44                Moderate to severe decrease  G4                    15-29                Severe decrease  G5                    14 or less           Kidney failure          (1)In the absence of evidence of kidney disease, neither GFR category G1 or G2 fulfill the criteria for CKD.    eGFR calculation 2021 CKD-EPI creatinine equation, which does not include race as a factor    Protime-INR [[id number]]  (Normal) Collected: 02/23/25 1341    Specimen: Blood Updated: 02/23/25 1408     Protime 13.3 Seconds      INR 0.96     Lipase [[id number]]  (Normal) Collected: 02/23/25 1341    Specimen: Blood Updated: 02/23/25 1410     Lipase 33 U/L     Magnesium [[id number]]  (Normal) Collected: 02/23/25 1341    Specimen: Blood Updated: 02/23/25 1414     Magnesium 1.9 mg/dL     Lactic Acid, Plasma [[id number]]  (Normal) Collected: 02/23/25 1341    Specimen: Blood Updated: 02/23/25 1412     Lactate 1.1 mmol/L     CBC Auto Differential [[id number]]  (Abnormal) Collected: 02/23/25 1341    Specimen: Blood Updated: 02/23/25 1356     WBC 13.48 10*3/mm3      RBC 4.29 10*6/mm3      Hemoglobin 12.8 g/dL      Hematocrit 39.2 %      MCV 91.4 fL      MCH 29.8 pg      MCHC 32.7 g/dL      RDW 13.7 %      RDW-SD 46.0 fl      MPV 11.0 fL      Platelets 285 10*3/mm3      Neutrophil % 71.3 %      Lymphocyte % 18.2 %      Monocyte % 8.5 %      Eosinophil % 1.0 %      Basophil % 0.3 %      Immature Grans % 0.7 %      Neutrophils, Absolute 9.60 10*3/mm3      Lymphocytes, Absolute 2.46 10*3/mm3      Monocytes, Absolute 1.14 10*3/mm3      Eosinophils, Absolute 0.14 10*3/mm3      Basophils, Absolute 0.04 10*3/mm3      Immature Grans, Absolute 0.10 10*3/mm3      nRBC 0.0 /100 WBC           CT Abdomen Pelvis With Contrast   Final Result       Mild acute sigmoid diverticulitis.               This report was signed and finalized on 2/23/2025 3:26 PM by Teja Ramírez.              Medical Decision Making  I had a discussion with the patient/family regarding diagnosis, diagnostic results, treatment plan, and medications.  The patient/family indicated understanding of these instructions.  I spent adequate time at the bedside prior to discharge necessary to discuss the aftercare instructions, giving patient education, providing explanations of the results of our evaluations/findings, and my decision making to assure that the patient/family understand the plan of care.  Time was allotted to answer questions at that time and throughout the ED course.  Emphasis was placed on timely  follow-up after discharge.  I also discussed the potential for the development of an acute emergent condition requiring further evaluation, return to the ER, admission, or even surgical intervention. I discussed that we found nothing during the visit today indicating the need for further ER workup at this time, admission to the hospital, or the presence of an acute unstable medical condition.  I encouraged the patient to return to the emergency department immediately for ANY concerns, worsening, new complaints, or if symptoms persist and unable to seek follow-up in a timely fashion.  The patient/family expressed understanding and agreement with this plan.      Problems Addressed:  Diverticulitis: complicated acute illness or injury    Amount and/or Complexity of Data Reviewed  Labs: ordered. Decision-making details documented in ED Course.  Radiology: ordered. Decision-making details documented in ED Course.    Risk  Prescription drug management.        Final diagnoses:   Diverticulitis       ED Disposition  ED Disposition       ED Disposition   Discharge    Condition   Stable    Comment   --               Charly Dominguez, APRN  [address] [facility] [address]  [phone]    Schedule an appointment as soon as possible for a visit       [facility] EMERGENCY DEPARTMENT  [address] [place], [place] [id number] [phone]    If symptoms worsen, As needed         Medication List        New Prescriptions      ciprofloxacin 500 MG tablet  Commonly known as: CIPRO  Take 1 tablet by mouth 2 (Two) Times a Day for 7 days.     metroNIDAZOLE 500 MG tablet  Commonly known as: FLAGYL  Take 1 tablet by mouth 3 (Three) Times a Day for 7 days.               Where to Get Your Medications        These medications were sent to [facility]/pharmacy #[id number] - [place], [place] - [address] AT ACROSS FROM [address] - [phone]  - [phone]   [address] [facility] [address]      Phone: [phone]    ciprofloxacin 500 MG tablet  metroNIDAZOLE 500 MG tablet            Jose Pearson MD  02/23/25 3652

## 2025-02-25 ENCOUNTER — NURSE TRIAGE (OUTPATIENT)
Dept: CALL CENTER | Facility: HOSPITAL | Age: 75
End: 2025-02-25
Payer: MEDICARE

## 2025-04-30 ENCOUNTER — TRANSCRIBE ORDERS (OUTPATIENT)
Dept: ADMINISTRATIVE | Facility: HOSPITAL | Age: 75
End: 2025-04-30
Payer: MEDICARE

## 2025-04-30 DIAGNOSIS — K21.9 CHRONIC GERD: Primary | ICD-10-CM

## 2025-06-05 RX ORDER — HYDROCHLOROTHIAZIDE 50 MG/1
50 TABLET ORAL DAILY
OUTPATIENT
Start: 2025-06-05

## 2025-06-05 NOTE — TELEPHONE ENCOUNTER
PATIENT REPORTS SHE IS  NOW TAKING HCTZ 25 MG, REDUCED BY AUBREE MANUEL.  PHARMACY HAS BEEN CALLED TO REFILL WITH PCP

## 2025-08-04 RX ORDER — SPIRONOLACTONE 50 MG/1
50 TABLET, FILM COATED ORAL DAILY
Qty: 30 TABLET | Refills: 0 | Status: SHIPPED | OUTPATIENT
Start: 2025-08-04 | End: 2025-08-07

## 2025-08-07 ENCOUNTER — OFFICE VISIT (OUTPATIENT)
Dept: CARDIOLOGY | Facility: CLINIC | Age: 75
End: 2025-08-07
Payer: MEDICARE

## 2025-08-07 VITALS
DIASTOLIC BLOOD PRESSURE: 73 MMHG | SYSTOLIC BLOOD PRESSURE: 137 MMHG | HEART RATE: 62 BPM | OXYGEN SATURATION: 98 % | HEIGHT: 58 IN | WEIGHT: 168 LBS | BODY MASS INDEX: 35.26 KG/M2

## 2025-08-07 DIAGNOSIS — E66.812 CLASS 2 OBESITY DUE TO EXCESS CALORIES WITHOUT SERIOUS COMORBIDITY WITH BODY MASS INDEX (BMI) OF 35.0 TO 35.9 IN ADULT: ICD-10-CM

## 2025-08-07 DIAGNOSIS — I10 ESSENTIAL HYPERTENSION: Primary | ICD-10-CM

## 2025-08-07 DIAGNOSIS — I87.2 VENOUS INSUFFICIENCY: ICD-10-CM

## 2025-08-07 DIAGNOSIS — E78.2 MIXED HYPERLIPIDEMIA: ICD-10-CM

## 2025-08-07 DIAGNOSIS — E66.09 CLASS 2 OBESITY DUE TO EXCESS CALORIES WITHOUT SERIOUS COMORBIDITY WITH BODY MASS INDEX (BMI) OF 35.0 TO 35.9 IN ADULT: ICD-10-CM

## 2025-08-07 DIAGNOSIS — I50.32 CHRONIC DIASTOLIC CONGESTIVE HEART FAILURE: ICD-10-CM

## 2025-08-07 DIAGNOSIS — G47.33 OSA TREATED WITH BIPAP: ICD-10-CM

## 2025-08-07 RX ORDER — CLONIDINE HYDROCHLORIDE 0.1 MG/1
0.1 TABLET ORAL DAILY PRN
Qty: 30 TABLET | Refills: 11 | Status: SHIPPED | OUTPATIENT
Start: 2025-08-07

## 2025-08-07 RX ORDER — SPIRONOLACTONE 50 MG/1
50 TABLET, FILM COATED ORAL DAILY
Qty: 90 TABLET | Refills: 3 | Status: SHIPPED | OUTPATIENT
Start: 2025-08-07

## 2025-08-20 ENCOUNTER — TELEPHONE (OUTPATIENT)
Dept: VASCULAR SURGERY | Facility: CLINIC | Age: 75
End: 2025-08-20
Payer: MEDICARE

## 2025-08-21 ENCOUNTER — OFFICE VISIT (OUTPATIENT)
Dept: VASCULAR SURGERY | Facility: CLINIC | Age: 75
End: 2025-08-21
Payer: MEDICARE

## 2025-08-21 ENCOUNTER — HOSPITAL ENCOUNTER (OUTPATIENT)
Dept: ULTRASOUND IMAGING | Facility: HOSPITAL | Age: 75
Discharge: HOME | End: 2025-08-21
Admitting: NURSE PRACTITIONER
Payer: MEDICARE

## 2025-08-21 VITALS
BODY MASS INDEX: 35.26 KG/M2 | DIASTOLIC BLOOD PRESSURE: 62 MMHG | OXYGEN SATURATION: 98 % | WEIGHT: 168 LBS | SYSTOLIC BLOOD PRESSURE: 124 MMHG | HEIGHT: 58 IN | HEART RATE: 75 BPM

## 2025-08-21 DIAGNOSIS — E78.2 MIXED HYPERLIPIDEMIA: ICD-10-CM

## 2025-08-21 DIAGNOSIS — I65.23 BILATERAL CAROTID ARTERY STENOSIS: ICD-10-CM

## 2025-08-21 DIAGNOSIS — I87.2 VENOUS INSUFFICIENCY: ICD-10-CM

## 2025-08-21 DIAGNOSIS — I10 ESSENTIAL HYPERTENSION: ICD-10-CM

## 2025-08-21 DIAGNOSIS — I65.23 BILATERAL CAROTID ARTERY STENOSIS: Primary | ICD-10-CM

## 2025-08-21 PROCEDURE — 99214 OFFICE O/P EST MOD 30 MIN: CPT | Performed by: NURSE PRACTITIONER

## 2025-08-21 PROCEDURE — 3078F DIAST BP <80 MM HG: CPT | Performed by: NURSE PRACTITIONER

## 2025-08-21 PROCEDURE — 1160F RVW MEDS BY RX/DR IN RCRD: CPT | Performed by: NURSE PRACTITIONER

## 2025-08-21 PROCEDURE — 93880 EXTRACRANIAL BILAT STUDY: CPT

## 2025-08-21 PROCEDURE — 3074F SYST BP LT 130 MM HG: CPT | Performed by: NURSE PRACTITIONER

## 2025-08-21 PROCEDURE — 1159F MED LIST DOCD IN RCRD: CPT | Performed by: NURSE PRACTITIONER

## 2025-08-21 RX ORDER — PEN NEEDLE, DIABETIC 32GX 5/32"
NEEDLE, DISPOSABLE MISCELLANEOUS
COMMUNITY
Start: 2025-05-27

## 2025-08-26 RX ORDER — LISINOPRIL 40 MG/1
40 TABLET ORAL DAILY
Qty: 90 TABLET | Refills: 3 | Status: SHIPPED | OUTPATIENT
Start: 2025-08-26

## (undated) DEVICE — SUCTION IRRIGATOR: Brand: ENDOWRIST

## (undated) DEVICE — SYSTEM BX CAP BILI RAP EXCHG CAP LOK DEV COMPATIBLE W/ OLY

## (undated) DEVICE — BNDG ELAS W/CLIP 6IN 10YD LF STRL

## (undated) DEVICE — SEAL

## (undated) DEVICE — SHEATH INTRO MICRO 7F 11CM

## (undated) DEVICE — Device

## (undated) DEVICE — SYR LL TP 10ML STRL

## (undated) DEVICE — ENDO KIT,LOURDES HOSPITAL: Brand: MEDLINE INDUSTRIES, INC.

## (undated) DEVICE — GLV SURG SENSICARE W/ALOE PF LF 7.5 STRL

## (undated) DEVICE — RETRIEVAL BALLOON CATHETER: Brand: EXTRACTOR™ PRO RX

## (undated) DEVICE — BANDAGE,GAUZE,BULKEE II,4.5"X4.1YD,STRL: Brand: MEDLINE

## (undated) DEVICE — STPCK 4WY ON/OFF VLV M/COLAR FIT 45PSI STRL

## (undated) DEVICE — STERILE ULTRASOUND GEL, SAFEWRAP: Brand: ECOVUE

## (undated) DEVICE — SPHINCTEROTOME: Brand: DREAMTOME™ RX 44

## (undated) DEVICE — ADHS LIQ MASTISOL 2/3ML

## (undated) DEVICE — NEEDLE, QUINCKE, 20GX3.5": Brand: MEDLINE

## (undated) DEVICE — ANESTHESIA CIRCUIT ADULT-LF: Brand: MEDLINE INDUSTRIES, INC.

## (undated) DEVICE — SNARE ENDOSCP L240CM LOOP W13MM SHTH DIA2.4MM SM OVL FLX

## (undated) DEVICE — ST TB EXT STANDARDBORE 30IN

## (undated) DEVICE — DECANTER: Brand: UNBRANDED

## (undated) DEVICE — STERILE (14X122CM) TELESCOPICALLY-FOLDED COVER: Brand: CIV-CLEAR™ TRANSDUCER COVER

## (undated) DEVICE — RESCUE COMBO FORCEPS

## (undated) DEVICE — PAD MINOR UNIVERSAL: Brand: MEDLINE INDUSTRIES, INC.

## (undated) DEVICE — ELECTRD BLD EZ CLN MOD XLNG 2.75IN

## (undated) DEVICE — 3M™ IOBAN™ 2 ANTIMICROBIAL INCISE DRAPE 6650EZ: Brand: IOBAN™ 2

## (undated) DEVICE — BAPTIST TURNOVER KIT: Brand: MEDLINE INDUSTRIES, INC.

## (undated) DEVICE — BNDG ADHS CURAD FLX/FABRC 1X3IN STRL LF

## (undated) DEVICE — CURAVIEW LED LARYNGOSCOPE BLADE & HANDLE,DISPOSABLE,MILLER 2: Brand: CURAPLEX

## (undated) DEVICE — TISSUE RETRIEVAL SYSTEM: Brand: INZII RETRIEVAL SYSTEM

## (undated) DEVICE — ANTIBACTERIAL UNDYED BRAIDED (POLYGLACTIN 910), SYNTHETIC ABSORBABLE SUTURE: Brand: COATED VICRYL

## (undated) DEVICE — BLANKT WARM LOWR/BDY 100X120CM

## (undated) DEVICE — APPL CHLORAPREP HI/LITE 26ML ORNG

## (undated) DEVICE — ST INF 2NDARY 20DRP VNT/NOVNT 30IN

## (undated) DEVICE — BLADELESS OBTURATOR: Brand: WECK VISTA

## (undated) DEVICE — PATIENT RETURN ELECTRODE, SINGLE-USE, CONTACT QUALITY MONITORING, ADULT, WITH 9FT CORD, FOR PATIENTS WEIGING OVER 33LBS. (15KG): Brand: MEGADYNE

## (undated) DEVICE — CONTRAST IOTHALAMATE MEGLUMINE 60% 50 ML INJ CONRAY 60

## (undated) DEVICE — STRIP,CLOSURE,WOUND,MEDI-STRIP,1/2X4: Brand: MEDLINE

## (undated) DEVICE — BNDG ELAS ECON W/CLIP 4IN 5YD LF STRL

## (undated) DEVICE — SPNG GZ STRL 2S 4X4 12PLY

## (undated) DEVICE — SUT VIC 0 SUTUPAK TIES 18IN J906G

## (undated) DEVICE — TUBE ET 7MM NSL ORAL BASIC CUF INTMED MURPHY EYE RADPQ MRK

## (undated) DEVICE — MONOPOLAR METZENBAUM SCISSOR, MINI BLADE TIP, DISPOSABLE: Brand: MONOPOLAR METZENBAUM SCISSOR, MINI BLADE TIP, DISPOSABLE

## (undated) DEVICE — ARM DRAPE

## (undated) DEVICE — NDL FLTR BLNT 18G 1 1/2IN

## (undated) DEVICE — CATH CLS/FST ENDOVENOUS REMFG 7X100CM

## (undated) DEVICE — INTENDED FOR TISSUE SEPARATION, AND OTHER PROCEDURES THAT REQUIRE A SHARP SURGICAL BLADE TO PUNCTURE OR CUT.: Brand: BARD-PARKER ® STAINLESS STEEL BLADES

## (undated) DEVICE — KT CLN CLEANOR SCPE

## (undated) DEVICE — DAVINCI: Brand: MEDLINE INDUSTRIES, INC.

## (undated) DEVICE — PDS II VLT 0 107CM AG ST3: Brand: ENDOLOOP

## (undated) DEVICE — ST TBG PNEUMOCLEAR EVAC SMOKE HIFLO

## (undated) DEVICE — SYR LUERLOK 20CC BX/50